# Patient Record
Sex: FEMALE | Race: BLACK OR AFRICAN AMERICAN | Employment: OTHER | URBAN - METROPOLITAN AREA
[De-identification: names, ages, dates, MRNs, and addresses within clinical notes are randomized per-mention and may not be internally consistent; named-entity substitution may affect disease eponyms.]

---

## 2022-01-13 ENCOUNTER — HOSPITAL ENCOUNTER (OUTPATIENT)
Age: 67
Setting detail: OBSERVATION
Discharge: HOME | End: 2022-01-16
Attending: EMERGENCY MEDICINE | Admitting: INTERNAL MEDICINE
Payer: MEDICARE

## 2022-01-13 ENCOUNTER — APPOINTMENT (OUTPATIENT)
Dept: CT IMAGING | Age: 67
End: 2022-01-13
Attending: EMERGENCY MEDICINE
Payer: MEDICARE

## 2022-01-13 ENCOUNTER — APPOINTMENT (OUTPATIENT)
Dept: GENERAL RADIOLOGY | Age: 67
End: 2022-01-13
Attending: EMERGENCY MEDICINE
Payer: MEDICARE

## 2022-01-13 ENCOUNTER — APPOINTMENT (OUTPATIENT)
Dept: VASCULAR SURGERY | Age: 67
End: 2022-01-13
Attending: EMERGENCY MEDICINE
Payer: MEDICARE

## 2022-01-13 ENCOUNTER — APPOINTMENT (OUTPATIENT)
Dept: NUCLEAR MEDICINE | Age: 67
End: 2022-01-13
Attending: EMERGENCY MEDICINE
Payer: MEDICARE

## 2022-01-13 DIAGNOSIS — U07.1 COVID-19: Primary | ICD-10-CM

## 2022-01-13 DIAGNOSIS — R06.02 SOB (SHORTNESS OF BREATH): ICD-10-CM

## 2022-01-13 PROBLEM — E44.0 MODERATE MALNUTRITION (HCC): Status: ACTIVE | Noted: 2021-01-06

## 2022-01-13 PROBLEM — N17.9 AKI (ACUTE KIDNEY INJURY) (HCC): Status: ACTIVE | Noted: 2018-06-06

## 2022-01-13 PROBLEM — I50.9 CHF EXACERBATION (HCC): Status: ACTIVE | Noted: 2021-10-23

## 2022-01-13 PROBLEM — J96.01 ACUTE RESPIRATORY FAILURE WITH HYPOXIA (HCC): Status: ACTIVE | Noted: 2018-06-05

## 2022-01-13 PROBLEM — I50.21 ACUTE SYSTOLIC CHF (CONGESTIVE HEART FAILURE) (HCC): Status: ACTIVE | Noted: 2019-08-14

## 2022-01-13 PROBLEM — J45.20 MILD INTERMITTENT ASTHMA WITHOUT COMPLICATION: Status: ACTIVE | Noted: 2019-08-15

## 2022-01-13 LAB
ALBUMIN SERPL-MCNC: 3.4 G/DL (ref 3.4–5)
ALBUMIN/GLOB SERPL: 0.9 {RATIO} (ref 0.8–1.7)
ALP SERPL-CCNC: 91 U/L (ref 45–117)
ALT SERPL-CCNC: 35 U/L (ref 13–56)
AMPHET UR QL SCN: NEGATIVE
ANION GAP SERPL CALC-SCNC: 8 MMOL/L (ref 3–18)
ARTERIAL PATENCY WRIST A: POSITIVE
AST SERPL-CCNC: 28 U/L (ref 10–38)
ATRIAL RATE: 98 BPM
BARBITURATES UR QL SCN: NEGATIVE
BASE DEFICIT BLD-SCNC: 3.1 MMOL/L
BASOPHILS # BLD: 0 K/UL (ref 0–0.1)
BASOPHILS NFR BLD: 0 % (ref 0–2)
BDY SITE: NORMAL
BENZODIAZ UR QL: NEGATIVE
BILIRUB SERPL-MCNC: 0.6 MG/DL (ref 0.2–1)
BNP SERPL-MCNC: 7386 PG/ML (ref 0–900)
BUN SERPL-MCNC: 27 MG/DL (ref 7–18)
BUN/CREAT SERPL: 16 (ref 12–20)
CALCIUM SERPL-MCNC: 9.4 MG/DL (ref 8.5–10.1)
CALCULATED P AXIS, ECG09: 30 DEGREES
CALCULATED R AXIS, ECG10: -23 DEGREES
CALCULATED T AXIS, ECG11: 136 DEGREES
CANNABINOIDS UR QL SCN: NEGATIVE
CHLORIDE SERPL-SCNC: 114 MMOL/L (ref 100–111)
CO2 SERPL-SCNC: 25 MMOL/L (ref 21–32)
COCAINE UR QL SCN: NEGATIVE
COVID-19 RAPID TEST, COVR: DETECTED
CREAT SERPL-MCNC: 1.74 MG/DL (ref 0.6–1.3)
CRP SERPL-MCNC: 1.1 MG/DL (ref 0–0.3)
D DIMER PPP FEU-MCNC: 1.6 UG/ML(FEU)
DIAGNOSIS, 93000: NORMAL
DIFFERENTIAL METHOD BLD: ABNORMAL
EOSINOPHIL # BLD: 0.1 K/UL (ref 0–0.4)
EOSINOPHIL NFR BLD: 1 % (ref 0–5)
ERYTHROCYTE [DISTWIDTH] IN BLOOD BY AUTOMATED COUNT: 15.5 % (ref 11.6–14.5)
FERRITIN SERPL-MCNC: 97 NG/ML (ref 8–388)
GAS FLOW.O2 O2 DELIVERY SYS: NORMAL L/MIN
GLOBULIN SER CALC-MCNC: 3.8 G/DL (ref 2–4)
GLUCOSE SERPL-MCNC: 99 MG/DL (ref 74–99)
HCO3 BLD-SCNC: 22.1 MMOL/L (ref 22–26)
HCT VFR BLD AUTO: 39.9 % (ref 35–45)
HDSCOM,HDSCOM: NORMAL
HGB BLD-MCNC: 11.9 G/DL (ref 12–16)
IMM GRANULOCYTES # BLD AUTO: 0 K/UL (ref 0–0.04)
IMM GRANULOCYTES NFR BLD AUTO: 0 % (ref 0–0.5)
L PNEUMO AG UR QL IA: NEGATIVE
LDH SERPL L TO P-CCNC: 243 U/L (ref 81–234)
LYMPHOCYTES # BLD: 1 K/UL (ref 0.9–3.6)
LYMPHOCYTES NFR BLD: 17 % (ref 21–52)
MCH RBC QN AUTO: 29 PG (ref 24–34)
MCHC RBC AUTO-ENTMCNC: 29.8 G/DL (ref 31–37)
MCV RBC AUTO: 97.1 FL (ref 78–100)
METHADONE UR QL: NEGATIVE
MONOCYTES # BLD: 0.3 K/UL (ref 0.05–1.2)
MONOCYTES NFR BLD: 6 % (ref 3–10)
NEUTS SEG # BLD: 4.5 K/UL (ref 1.8–8)
NEUTS SEG NFR BLD: 76 % (ref 40–73)
NRBC # BLD: 0 K/UL (ref 0–0.01)
NRBC BLD-RTO: 0 PER 100 WBC
O2/TOTAL GAS SETTING VFR VENT: 36 %
OPIATES UR QL: NEGATIVE
P-R INTERVAL, ECG05: 158 MS
PCO2 BLD: 39.3 MMHG (ref 35–45)
PCP UR QL: NEGATIVE
PH BLD: 7.36 [PH] (ref 7.35–7.45)
PLATELET # BLD AUTO: 243 K/UL (ref 135–420)
PMV BLD AUTO: 11.8 FL (ref 9.2–11.8)
PO2 BLD: 84 MMHG (ref 80–100)
POTASSIUM SERPL-SCNC: 3.8 MMOL/L (ref 3.5–5.5)
PROT SERPL-MCNC: 7.2 G/DL (ref 6.4–8.2)
Q-T INTERVAL, ECG07: 396 MS
QRS DURATION, ECG06: 116 MS
QTC CALCULATION (BEZET), ECG08: 505 MS
RBC # BLD AUTO: 4.11 M/UL (ref 4.2–5.3)
S PNEUM AG UR QL: NEGATIVE
SAO2 % BLD: 95.8 % (ref 92–97)
SARS-COV-2, COV2: NORMAL
SERVICE CMNT-IMP: NORMAL
SODIUM SERPL-SCNC: 147 MMOL/L (ref 136–145)
SOURCE, COVRS: ABNORMAL
SPECIMEN TYPE: NORMAL
TROPONIN-HIGH SENSITIVITY: 47 NG/L (ref 0–54)
VENTRICULAR RATE, ECG03: 98 BPM
WBC # BLD AUTO: 6 K/UL (ref 4.6–13.2)

## 2022-01-13 PROCEDURE — 83880 ASSAY OF NATRIURETIC PEPTIDE: CPT

## 2022-01-13 PROCEDURE — 71045 X-RAY EXAM CHEST 1 VIEW: CPT

## 2022-01-13 PROCEDURE — 36600 WITHDRAWAL OF ARTERIAL BLOOD: CPT

## 2022-01-13 PROCEDURE — U0003 INFECTIOUS AGENT DETECTION BY NUCLEIC ACID (DNA OR RNA); SEVERE ACUTE RESPIRATORY SYNDROME CORONAVIRUS 2 (SARS-COV-2) (CORONAVIRUS DISEASE [COVID-19]), AMPLIFIED PROBE TECHNIQUE, MAKING USE OF HIGH THROUGHPUT TECHNOLOGIES AS DESCRIBED BY CMS-2020-01-R: HCPCS

## 2022-01-13 PROCEDURE — 82728 ASSAY OF FERRITIN: CPT

## 2022-01-13 PROCEDURE — 87635 SARS-COV-2 COVID-19 AMP PRB: CPT

## 2022-01-13 PROCEDURE — 87449 NOS EACH ORGANISM AG IA: CPT

## 2022-01-13 PROCEDURE — G0378 HOSPITAL OBSERVATION PER HR: HCPCS

## 2022-01-13 PROCEDURE — 96376 TX/PRO/DX INJ SAME DRUG ADON: CPT

## 2022-01-13 PROCEDURE — 80307 DRUG TEST PRSMV CHEM ANLYZR: CPT

## 2022-01-13 PROCEDURE — 82803 BLOOD GASES ANY COMBINATION: CPT

## 2022-01-13 PROCEDURE — 84484 ASSAY OF TROPONIN QUANT: CPT

## 2022-01-13 PROCEDURE — 74011250636 HC RX REV CODE- 250/636: Performed by: EMERGENCY MEDICINE

## 2022-01-13 PROCEDURE — 83615 LACTATE (LD) (LDH) ENZYME: CPT

## 2022-01-13 PROCEDURE — 85379 FIBRIN DEGRADATION QUANT: CPT

## 2022-01-13 PROCEDURE — 74011250637 HC RX REV CODE- 250/637: Performed by: INTERNAL MEDICINE

## 2022-01-13 PROCEDURE — 71275 CT ANGIOGRAPHY CHEST: CPT

## 2022-01-13 PROCEDURE — 85025 COMPLETE CBC W/AUTO DIFF WBC: CPT

## 2022-01-13 PROCEDURE — 96375 TX/PRO/DX INJ NEW DRUG ADDON: CPT

## 2022-01-13 PROCEDURE — 74011250637 HC RX REV CODE- 250/637: Performed by: EMERGENCY MEDICINE

## 2022-01-13 PROCEDURE — 99223 1ST HOSP IP/OBS HIGH 75: CPT | Performed by: INTERNAL MEDICINE

## 2022-01-13 PROCEDURE — 93970 EXTREMITY STUDY: CPT

## 2022-01-13 PROCEDURE — 96374 THER/PROPH/DIAG INJ IV PUSH: CPT

## 2022-01-13 PROCEDURE — 80053 COMPREHEN METABOLIC PANEL: CPT

## 2022-01-13 PROCEDURE — 65270000029 HC RM PRIVATE

## 2022-01-13 PROCEDURE — 93005 ELECTROCARDIOGRAM TRACING: CPT

## 2022-01-13 PROCEDURE — 96372 THER/PROPH/DIAG INJ SC/IM: CPT

## 2022-01-13 PROCEDURE — 74011000250 HC RX REV CODE- 250: Performed by: INTERNAL MEDICINE

## 2022-01-13 PROCEDURE — A9540 TC99M MAA: HCPCS

## 2022-01-13 PROCEDURE — 74011000636 HC RX REV CODE- 636: Performed by: EMERGENCY MEDICINE

## 2022-01-13 PROCEDURE — 74011250636 HC RX REV CODE- 250/636: Performed by: INTERNAL MEDICINE

## 2022-01-13 PROCEDURE — 99285 EMERGENCY DEPT VISIT HI MDM: CPT

## 2022-01-13 PROCEDURE — 86140 C-REACTIVE PROTEIN: CPT

## 2022-01-13 PROCEDURE — 94762 N-INVAS EAR/PLS OXIMTRY CONT: CPT

## 2022-01-13 PROCEDURE — C9113 INJ PANTOPRAZOLE SODIUM, VIA: HCPCS | Performed by: INTERNAL MEDICINE

## 2022-01-13 RX ORDER — CARVEDILOL 6.25 MG/1
6.25 TABLET ORAL 2 TIMES DAILY
COMMUNITY
Start: 2021-10-22

## 2022-01-13 RX ORDER — ENOXAPARIN SODIUM 100 MG/ML
1 INJECTION SUBCUTANEOUS EVERY 12 HOURS
Status: DISCONTINUED | OUTPATIENT
Start: 2022-01-13 | End: 2022-01-13

## 2022-01-13 RX ORDER — CALCIUM CARB/MAGNESIUM CARB 311-232MG
10 TABLET ORAL
Status: DISCONTINUED | OUTPATIENT
Start: 2022-01-13 | End: 2022-01-16 | Stop reason: HOSPADM

## 2022-01-13 RX ORDER — SODIUM CHLORIDE 0.9 % (FLUSH) 0.9 %
5-40 SYRINGE (ML) INJECTION AS NEEDED
Status: DISCONTINUED | OUTPATIENT
Start: 2022-01-13 | End: 2022-01-16 | Stop reason: HOSPADM

## 2022-01-13 RX ORDER — ACETAMINOPHEN 650 MG/1
650 SUPPOSITORY RECTAL
Status: DISCONTINUED | OUTPATIENT
Start: 2022-01-13 | End: 2022-01-16 | Stop reason: HOSPADM

## 2022-01-13 RX ORDER — ACETAMINOPHEN 325 MG/1
650 TABLET ORAL
Status: DISCONTINUED | OUTPATIENT
Start: 2022-01-13 | End: 2022-01-16 | Stop reason: HOSPADM

## 2022-01-13 RX ORDER — DEXAMETHASONE SODIUM PHOSPHATE 10 MG/ML
6 INJECTION INTRAMUSCULAR; INTRAVENOUS
Status: COMPLETED | OUTPATIENT
Start: 2022-01-13 | End: 2022-01-13

## 2022-01-13 RX ORDER — HYDRALAZINE HYDROCHLORIDE 25 MG/1
25 TABLET, FILM COATED ORAL 3 TIMES DAILY
Status: DISCONTINUED | OUTPATIENT
Start: 2022-01-13 | End: 2022-01-16 | Stop reason: HOSPADM

## 2022-01-13 RX ORDER — BUMETANIDE 0.25 MG/ML
1 INJECTION INTRAMUSCULAR; INTRAVENOUS EVERY 12 HOURS
Status: DISCONTINUED | OUTPATIENT
Start: 2022-01-13 | End: 2022-01-15

## 2022-01-13 RX ORDER — ENOXAPARIN SODIUM 100 MG/ML
1 INJECTION SUBCUTANEOUS ONCE
Status: COMPLETED | OUTPATIENT
Start: 2022-01-13 | End: 2022-01-13

## 2022-01-13 RX ORDER — ENOXAPARIN SODIUM 100 MG/ML
30 INJECTION SUBCUTANEOUS EVERY 12 HOURS
Status: DISCONTINUED | OUTPATIENT
Start: 2022-01-13 | End: 2022-01-14

## 2022-01-13 RX ORDER — ASCORBIC ACID 250 MG
250 TABLET ORAL DAILY
Status: DISCONTINUED | OUTPATIENT
Start: 2022-01-13 | End: 2022-01-16 | Stop reason: HOSPADM

## 2022-01-13 RX ORDER — POLYETHYLENE GLYCOL 3350 17 G/17G
17 POWDER, FOR SOLUTION ORAL DAILY PRN
Status: DISCONTINUED | OUTPATIENT
Start: 2022-01-13 | End: 2022-01-16 | Stop reason: HOSPADM

## 2022-01-13 RX ORDER — ONDANSETRON 2 MG/ML
4 INJECTION INTRAMUSCULAR; INTRAVENOUS
Status: DISCONTINUED | OUTPATIENT
Start: 2022-01-13 | End: 2022-01-16 | Stop reason: HOSPADM

## 2022-01-13 RX ORDER — ATORVASTATIN CALCIUM 20 MG/1
40 TABLET, FILM COATED ORAL
Status: DISCONTINUED | OUTPATIENT
Start: 2022-01-13 | End: 2022-01-16 | Stop reason: HOSPADM

## 2022-01-13 RX ORDER — DEXAMETHASONE SODIUM PHOSPHATE 10 MG/ML
6 INJECTION INTRAMUSCULAR; INTRAVENOUS EVERY 24 HOURS
Status: DISCONTINUED | OUTPATIENT
Start: 2022-01-14 | End: 2022-01-16

## 2022-01-13 RX ORDER — SODIUM CHLORIDE 0.9 % (FLUSH) 0.9 %
5-40 SYRINGE (ML) INJECTION EVERY 8 HOURS
Status: DISCONTINUED | OUTPATIENT
Start: 2022-01-13 | End: 2022-01-16 | Stop reason: HOSPADM

## 2022-01-13 RX ORDER — NITROGLYCERIN 0.4 MG/1
0.4 TABLET SUBLINGUAL
Status: COMPLETED | OUTPATIENT
Start: 2022-01-13 | End: 2022-01-13

## 2022-01-13 RX ORDER — ZINC SULFATE 50(220)MG
1 CAPSULE ORAL DAILY
Status: DISCONTINUED | OUTPATIENT
Start: 2022-01-14 | End: 2022-01-16 | Stop reason: HOSPADM

## 2022-01-13 RX ORDER — BUDESONIDE AND FORMOTEROL FUMARATE DIHYDRATE 80; 4.5 UG/1; UG/1
2 AEROSOL RESPIRATORY (INHALATION)
Status: DISCONTINUED | OUTPATIENT
Start: 2022-01-13 | End: 2022-01-16 | Stop reason: HOSPADM

## 2022-01-13 RX ORDER — ACETAMINOPHEN 325 MG/1
650 TABLET ORAL
Status: DISCONTINUED | OUTPATIENT
Start: 2022-01-13 | End: 2022-01-14 | Stop reason: SDUPTHER

## 2022-01-13 RX ORDER — ACETAMINOPHEN 650 MG/1
650 SUPPOSITORY RECTAL
Status: DISCONTINUED | OUTPATIENT
Start: 2022-01-13 | End: 2022-01-14 | Stop reason: SDUPTHER

## 2022-01-13 RX ORDER — FUROSEMIDE 10 MG/ML
40 INJECTION INTRAMUSCULAR; INTRAVENOUS
Status: COMPLETED | OUTPATIENT
Start: 2022-01-13 | End: 2022-01-13

## 2022-01-13 RX ORDER — ONDANSETRON 4 MG/1
4 TABLET, ORALLY DISINTEGRATING ORAL
Status: DISCONTINUED | OUTPATIENT
Start: 2022-01-13 | End: 2022-01-16 | Stop reason: HOSPADM

## 2022-01-13 RX ORDER — CARVEDILOL 3.12 MG/1
6.25 TABLET ORAL ONCE
Status: COMPLETED | OUTPATIENT
Start: 2022-01-13 | End: 2022-01-13

## 2022-01-13 RX ORDER — GUAIFENESIN/DEXTROMETHORPHAN 100-10MG/5
5 SYRUP ORAL
Status: DISCONTINUED | OUTPATIENT
Start: 2022-01-13 | End: 2022-01-16 | Stop reason: HOSPADM

## 2022-01-13 RX ORDER — CLOPIDOGREL BISULFATE 75 MG/1
75 TABLET ORAL DAILY
Status: DISCONTINUED | OUTPATIENT
Start: 2022-01-14 | End: 2022-01-16 | Stop reason: HOSPADM

## 2022-01-13 RX ORDER — HYDRALAZINE HYDROCHLORIDE 20 MG/ML
10 INJECTION INTRAMUSCULAR; INTRAVENOUS
Status: DISCONTINUED | OUTPATIENT
Start: 2022-01-13 | End: 2022-01-16 | Stop reason: HOSPADM

## 2022-01-13 RX ADMIN — IOPAMIDOL 100 ML: 755 INJECTION, SOLUTION INTRAVENOUS at 08:42

## 2022-01-13 RX ADMIN — NITROGLYCERIN 0.4 MG: 0.4 TABLET SUBLINGUAL at 07:35

## 2022-01-13 RX ADMIN — Medication 250 MG: at 12:10

## 2022-01-13 RX ADMIN — CARVEDILOL 6.25 MG: 3.12 TABLET, FILM COATED ORAL at 11:31

## 2022-01-13 RX ADMIN — ENOXAPARIN SODIUM 30 MG: 100 INJECTION SUBCUTANEOUS at 21:47

## 2022-01-13 RX ADMIN — BUMETANIDE 1 MG: 0.25 INJECTION, SOLUTION INTRAMUSCULAR; INTRAVENOUS at 21:44

## 2022-01-13 RX ADMIN — Medication 10 MG: at 21:47

## 2022-01-13 RX ADMIN — ATORVASTATIN CALCIUM 40 MG: 20 TABLET, FILM COATED ORAL at 21:43

## 2022-01-13 RX ADMIN — ISOSORBIDE DINITRATE 30 MG: 20 TABLET ORAL at 20:57

## 2022-01-13 RX ADMIN — HYDRALAZINE HYDROCHLORIDE 25 MG: 25 TABLET, FILM COATED ORAL at 21:44

## 2022-01-13 RX ADMIN — DEXAMETHASONE SODIUM PHOSPHATE 6 MG: 10 INJECTION, SOLUTION INTRAMUSCULAR; INTRAVENOUS at 12:11

## 2022-01-13 RX ADMIN — SODIUM CHLORIDE, PRESERVATIVE FREE 40 MG: 5 INJECTION INTRAVENOUS at 21:46

## 2022-01-13 RX ADMIN — SODIUM CHLORIDE, PRESERVATIVE FREE 40 MG: 5 INJECTION INTRAVENOUS at 12:15

## 2022-01-13 RX ADMIN — ENOXAPARIN SODIUM 100 MG: 100 INJECTION SUBCUTANEOUS at 10:11

## 2022-01-13 RX ADMIN — FUROSEMIDE 40 MG: 10 INJECTION, SOLUTION INTRAMUSCULAR; INTRAVENOUS at 07:36

## 2022-01-13 NOTE — Clinical Note
Status[de-identified] INPATIENT [101]   Type of Bed: Telemetry [19]   Cardiac Monitoring Required?: Yes   Inpatient Hospitalization Certified Necessary for the Following Reasons: 3.  Patient receiving treatment that can only be provided in an inpatient setting (further clarification in H&P documentation)   Admitting Diagnosis: Saige Levi [[id number]]   Admitting Physician: Con Pardaa [[id number]]   Attending Physician: Con Parada [[id number]]   Estimated Length of Stay: 2 Midnights   Discharge Plan[de-identified] Home with Office Follow-up

## 2022-01-13 NOTE — H&P
History & Physical    Patient: Audelia Smith MRN: [id number]  CSN: [id number]    YOB: 1955  Age: 74 y.o. Sex: female      DOA: 1/13/2022    Chief Complaint:   Chief Complaint   Patient presents with    Shortness of Breath          HPI:     Audelia Smith is a 74 y.o.  female who history of congestive heart failure, hypertension, TIA, chronic kidney disease stage IV baseline creatinine 2.2, asthma or presents to the emergency room with increasing shortness of breath and dyspnea that has been progressive for the last 2 days. Patient started with cough and congestion on Tuesday this is verified with her daughter. She has had 1 Moderna vaccine back in July and has never completed her second shot or booster    In the emergency room she is given Lasix 40 mg IV with diuresis  CTA done in the emergency room is inconclusive for PE  VQ scan was ordered and was pending at the time of admission  Dopplers of her legs were also ordered  Although patient was not severely hypoxic she was tachypneic and it was thought that she would benefit from admission. Please call [facility]  [phone] cell number   Anisa sister for emergency          Past Medical History:   Diagnosis Date    CHF (congestive heart failure) ([facility] [address]     Hypertension        History reviewed. No pertinent surgical history. History reviewed. No pertinent family history. Social History     Socioeconomic History    Marital status:    Tobacco Use    Smoking status: Never Smoker    Smokeless tobacco: Never Used   Substance and Sexual Activity    Alcohol use: Not Currently    Drug use: Never       Prior to Admission medications    Medication Sig Start Date End Date Taking? Authorizing Provider   carvediloL (COREG) 6.25 mg tablet Take 6.25 mg by mouth two (2) times a day.  10/22/21  Yes Other, MD Sayda       No Known Allergies      Review of Systems  GENERAL: Patient alert, awake and oriented times 3, able to communicate full sentences and not in distress. HEENT: No change in vision, no earache, tinnitus, sore throat or sinus congestion. NECK: No pain or stiffness. PULMONARY:+ shortness of breath, +cough or wheeze. Cardiovascular: no pnd or orthopnea, no CP  GASTROINTESTINAL: No abdominal pain, nausea, vomiting or diarrhea, melena or bright red blood per rectum. GENITOURINARY: No urinary frequency, urgency, hesitancy or dysuria. MUSCULOSKELETAL: No joint or muscle pain, no back pain, no recent trauma. DERMATOLOGIC: No rash, no itching, no lesions. ENDOCRINE: No polyuria, polydipsia, no heat or cold intolerance. No recent change in weight. HEMATOLOGICAL: No anemia or easy bruising or bleeding. NEUROLOGIC: No headache, seizures, numbness, tingling or weakness. Physical Exam:     Physical Exam:  Visit Vitals  BP (!) 181/98   Pulse 92   Temp 97.7 °F (36.5 °C)   Resp 27   Ht 5' 6\" (1.676 m)   Wt 97.5 kg (215 lb)   SpO2 100%   BMI 34.70 kg/m²      O2 Device: Nasal cannula    Temp (24hrs), Av.7 °F (36.5 °C), Min:97.7 °F (36.5 °C), Max:97.7 °F (36.5 °C)    No intake/output data recorded. No intake/output data recorded. General:  Alert, cooperative, no distress, appears stated age. Head: Normocephalic, without obvious abnormality, atraumatic. Eyes:  Conjunctivae/corneas clear. PERRL, EOMs intact. Nose: Nares normal. No drainage or sinus tenderness. Neck: Supple, symmetrical, trachea midline, no adenopathy, thyroid: no enlargement, no carotid bruit and no JVD. Lungs:   Clear to auscultation bilaterally. Heart:  Regular rate and rhythm, S1, S2 normal.     Abdomen: Soft, non-tender. Bowel sounds normal.    Extremities: Extremities normal, atraumatic, no cyanosis or edema. Pulses: 2+ and symmetric all extremities. Skin:  No rashes or lesions   Neurologic: AAOx3, No focal motor or sensory deficit. Labs Reviewed: All lab results for the last 24 hours reviewed.    and EKG    Procedures/imaging: see electronic medical records for all procedures/Xrays and details which were not copied into this note but were reviewed prior to creation of Plan      Assessment/Plan     Active Problems:    COVID (1/13/2022)  Started on dexamethasone  Started on vitamin cocktail  Started on therapeutic dose Lovenox this can be decreased to intermediate dose if PE and DVT studies are negative  She would be a fair candidate for remdesivir however she has a creatinine clearance of 28  We will get an infectious disease consult    Congestive heart failure  She is continued on IV Bumex CT scan does not show pleural effusions there is no generalized anasarca  Echo is ordered  Cardiology is consulted    Stage III renal disease  She was admitted in October with renal failure and CHF I will have nephrology consult to help out with management of diuretics as well in the setting of COVID and borderline CHF    Asthma   she is continued on her inhalers  Started on steroids    History of TIA  She is continuing her Plavix     Remote history of GI bleed   Will place on Protonix  And monitor her symptoms  DVT/GI Prophylaxis: Lovenox        Amber Calvin MD  1/13/2022 11:42 AM

## 2022-01-13 NOTE — ED PROVIDER NOTES
EMERGENCY DEPARTMENT HISTORY AND PHYSICAL EXAM    Date: 1/13/2022  Patient Name: Mellissa Lefort    History of Presenting Illness     Chief Complaint   Patient presents with    Shortness of Breath         History Provided By: Patient    Mellissa Lefort is a 53-year-old female with past medical history of obesity, congestive heart failure, hypertension presents for evaluation of shortness of breath. Patient started having shortness of breath at 10:00 last night. Patient states that this is worsened when she lays flat. On EMS arrival, patient was found to be 94% on room air, though tachypneic. She was given albuterol by EMS with significant improvement in her symptoms. She has recently had flulike symptoms, she has previously been vaccinated for COVID but has not received her booster. She denies any increased lower extremity swelling, nausea, vomiting, abdominal pain. PCP: Sheri Brito MD        Past History     Past Medical History:  Past Medical History:   Diagnosis Date    CHF (congestive heart failure) ([facility] [address]     Hypertension        Past Surgical History:  History reviewed. No pertinent surgical history. Family History:  History reviewed. No pertinent family history. Social History:  Social History     Tobacco Use    Smoking status: Never Smoker    Smokeless tobacco: Never Used   Substance Use Topics    Alcohol use: Not Currently    Drug use: Never       Allergies:  No Known Allergies      Review of Systems   Review of Systems   Constitutional: Negative for activity change and fever. HENT: Negative for congestion and sore throat. Eyes: Negative for discharge. Respiratory: Positive for shortness of breath. Negative for apnea. Cardiovascular: Negative for chest pain. Gastrointestinal: Negative for abdominal distention. Genitourinary: Negative for dysuria and flank pain. Musculoskeletal: Negative for arthralgias. Skin: Negative for rash.    Neurological: Negative for dizziness and weakness. Hematological: Negative for adenopathy. Psychiatric/Behavioral: Negative for agitation. All other systems reviewed and are negative. Physical Exam     Vitals:    01/13/22 1230 01/13/22 1347 01/13/22 1400 01/13/22 1500   BP: (!) 172/104 (!) 156/122 (!) 151/78 (!) 142/97   Pulse: 84 84 72 84   Resp: 25 16 19 20   Temp:       SpO2: 99% 96% 94% 94%   Weight:       Height:         Physical Exam    Nursing notes and vital signs reviewed    Constitutional: Non toxic appearing, moderate distress  Head: Normocephalic, Atraumatic  Eyes: EOMI  Neck: Supple  Cardiovascular: Regular rate and rhythm, no murmurs, rubs, or gallops  Chest: Normal work of breathing and chest excursion bilaterally  Lungs:  Tachypnea, no wheezing, minimally prolonged expiratory phase with diminished lung sounds in the right lung base  Abdomen: Soft, non tender, non distended, normoactive bowel sounds  Back: No evidence of trauma or deformity  Extremities: No evidence of trauma or deformity, nonpitting edema to bilateral lower extremities  Skin: Warm and dry, normal cap refill  Neuro: Alert and appropriate, CN intact, normal speech, strength and sensation full and symmetric bilaterally, normal coordination  Psychiatric: Normal mood and affect      Diagnostic Study Results     Labs -     Recent Results (from the past 12 hour(s))   EKG, 12 LEAD, INITIAL    Collection Time: 01/13/22  7:15 AM   Result Value Ref Range    Ventricular Rate 98 BPM    Atrial Rate 98 BPM    P-R Interval 158 ms    QRS Duration 116 ms    Q-T Interval 396 ms    QTC Calculation (Bezet) 505 ms    Calculated P Axis 30 degrees    Calculated R Axis -23 degrees    Calculated T Axis 136 degrees    Diagnosis       Sinus rhythm with occasional premature ventricular complexes  Possible Left atrial enlargement  Left ventricular hypertrophy with QRS widening and repolarization abnormality   ( R in aVL , Sokolow-Waggoner , Lima product )  Anteroseptal infarct , age undetermined  Abnormal ECG  No previous ECGs available  Confirmed by Petra Paiz MD, -- ([id number]) on 1/13/2022 8:55:22 AM     CBC WITH AUTOMATED DIFF    Collection Time: 01/13/22  7:23 AM   Result Value Ref Range    WBC 6.0 4.6 - 13.2 K/uL    RBC 4.11 (L) 4.20 - 5.30 M/uL    HGB 11.9 (L) 12.0 - 16.0 g/dL    HCT 39.9 35.0 - 45.0 %    MCV 97.1 78.0 - 100.0 FL    MCH 29.0 24.0 - 34.0 PG    MCHC 29.8 (L) 31.0 - 37.0 g/dL    RDW 15.5 (H) 11.6 - 14.5 %    PLATELET 117 102 - 978 K/uL    MPV 11.8 9.2 - 11.8 FL    NRBC 0.0 0  WBC    ABSOLUTE NRBC 0.00 0.00 - 0.01 K/uL    NEUTROPHILS 76 (H) 40 - 73 %    LYMPHOCYTES 17 (L) 21 - 52 %    MONOCYTES 6 3 - 10 %    EOSINOPHILS 1 0 - 5 %    BASOPHILS 0 0 - 2 %    IMMATURE GRANULOCYTES 0 0.0 - 0.5 %    ABS. NEUTROPHILS 4.5 1.8 - 8.0 K/UL    ABS. LYMPHOCYTES 1.0 0.9 - 3.6 K/UL    ABS. MONOCYTES 0.3 0.05 - 1.2 K/UL    ABS. EOSINOPHILS 0.1 0.0 - 0.4 K/UL    ABS. BASOPHILS 0.0 0.0 - 0.1 K/UL    ABS. IMM. GRANS. 0.0 0.00 - 0.04 K/UL    DF AUTOMATED     METABOLIC PANEL, COMPREHENSIVE    Collection Time: 01/13/22  7:23 AM   Result Value Ref Range    Sodium 147 (H) 136 - 145 mmol/L    Potassium 3.8 3.5 - 5.5 mmol/L    Chloride 114 (H) 100 - 111 mmol/L    CO2 25 21 - 32 mmol/L    Anion gap 8 3.0 - 18 mmol/L    Glucose 99 74 - 99 mg/dL    BUN 27 (H) 7.0 - 18 MG/DL    Creatinine 1.74 (H) 0.6 - 1.3 MG/DL    BUN/Creatinine ratio 16 12 - 20      GFR est AA 36 (L) >60 ml/min/1.73m2    GFR est non-AA 29 (L) >60 ml/min/1.73m2    Calcium 9.4 8.5 - 10.1 MG/DL    Bilirubin, total 0.6 0.2 - 1.0 MG/DL    ALT (SGPT) 35 13 - 56 U/L    AST (SGOT) 28 10 - 38 U/L    Alk.  phosphatase 91 45 - 117 U/L    Protein, total 7.2 6.4 - 8.2 g/dL    Albumin 3.4 3.4 - 5.0 g/dL    Globulin 3.8 2.0 - 4.0 g/dL    A-G Ratio 0.9 0.8 - 1.7     NT-PRO BNP    Collection Time: 01/13/22  7:23 AM   Result Value Ref Range    NT pro-BNP 7,386 (H) 0 - 900 PG/ML   TROPONIN-HIGH SENSITIVITY    Collection Time: 01/13/22  7:23 AM Result Value Ref Range    Troponin-High Sensitivity 47 0 - 54 ng/L   D DIMER    Collection Time: 01/13/22  7:23 AM   Result Value Ref Range    D DIMER 1.60 (H) <0.46 ug/ml(FEU)   SARS-COV-2    Collection Time: 01/13/22  7:25 AM   Result Value Ref Range    SARS-CoV-2 Please find results under separate order     COVID-19 RAPID TEST    Collection Time: 01/13/22  7:25 AM   Result Value Ref Range    Specimen source Nasopharyngeal      COVID-19 rapid test Detected (AA) NOTD     BLOOD GAS, ARTERIAL POC    Collection Time: 01/13/22  7:50 AM   Result Value Ref Range    Device: NASAL CANNULA      FIO2 (POC) 36 %    pH (POC) 7.36 7.35 - 7.45      pCO2 (POC) 39.3 35.0 - 45.0 MMHG    pO2 (POC) 84 80 - 100 MMHG    HCO3 (POC) 22.1 22 - 26 MMOL/L    sO2 (POC) 95.8 92 - 97 %    Base deficit (POC) 3.1 mmol/L    Allens test (POC) Positive      Site RIGHT RADIAL      Specimen type (POC) ARTERIAL      Performed by Isr[facility] RESP        Radiologic Studies -   NM LUNG SCAN PERF   Final Result      No scintigraphic findings to suggest the presence of acute pulmonary embolism. _______________        CTA CHEST W OR W WO CONT   Final Result      1. Mildly limited study due to motion obscuring peripheral vessels but no   definite evidence of pulmonary embolism. 2. Mild areas of bilateral lower lobe and partial right upper lobe atelectasis. Very mild peripheral nonspecific groundglass opacities bilateral upper lobes   which can be related to small airway disease, edema or atypical infection. 3. Cardiomegaly with reflux of contrast into IVC suggesting right heart failure. 4. Incidental bilateral nonobstructing renal calculi. XR CHEST PORT   Final Result         1.  Borderline cardiac enlargement with patchy perihilar and basilar areas of   edema/atelectasis       DUPLEX LOWER EXT VENOUS BILAT    (Results Pending)     CT Results  (Last 48 hours)               01/13/22 0859  CTA CHEST W OR W WO CONT Final result Impression:      1. Mildly limited study due to motion obscuring peripheral vessels but no   definite evidence of pulmonary embolism. 2. Mild areas of bilateral lower lobe and partial right upper lobe atelectasis. Very mild peripheral nonspecific groundglass opacities bilateral upper lobes   which can be related to small airway disease, edema or atypical infection. 3. Cardiomegaly with reflux of contrast into IVC suggesting right heart failure. 4. Incidental bilateral nonobstructing renal calculi. Narrative:  EXAM: CTA Chest       CLINICAL INDICATION/HISTORY: Shortness of breath, Covid positive. Possible PE.       COMPARISON: Plain film chest same day       TECHNIQUE: Axial CT imaging from the thoracic inlet through the diaphragm with   intravenous contrast utilizing CTA study for pulmonary artery evaluation. Coronal and sagittal MIP reformations were generated at a separate workstation. One or more dose reduction techniques were used on this CT: automated exposure   control, adjustment of the mAs and/or kVp according to patient's size, and   iterative reconstruction techniques. The specific techniques utilized on this CT   exam have been documented in the patient's electronic medical record. Digital   imaging and communications and medicine (DICOM) format image data are available   to nonaffiliated external healthcare facilities or entities on a secure, media   free, reciprocally searchable basis with patient authorization for at least a 12   month period after this study. _______________       FINDINGS:       EXAM QUALITY: Overall exam quality is adequate. Pulmonary arterial enhancement   is adequate. The breath hold is satisfactory. Respiratory motion artifact is   present, limiting evaluation of peripheral vessels. PULMONARY ARTERIES: No convincing evidence of pulmonary embolism.        MEDIASTINUM: Mild diffuse cardiomegaly with coronary artery calcifications with   no pericardial effusion. Mild atherosclerotic changes and ectasia thoracic   aorta. LYMPH NODES: Borderline enlarged mediastinal lymph nodes maximally 12 mm AP   window short axis dimension. AIRWAY: Unremarkable. LUNGS: Mild partial atelectasis bilateral lower lobes and mild small peripheral   areas of groundglass opacity bilateral upper lobes as well as small bandlike   atelectasis or scarring right upper lobe. No abnormal mass. PLEURA: No pleural effusion or pneumothorax. UPPER ABDOMEN: Reflux of contrast into the IVC and hepatic veins. Several   nonobstructing right renal calculi the largest right lower pole maximally 12 mm. Several small nonobstructing left renal calculi. .       OTHER: No acute or aggressive osseous abnormalities identified. _______________               CXR Results  (Last 48 hours)               01/13/22 0737  XR CHEST PORT Final result    Impression:          1. Borderline cardiac enlargement with patchy perihilar and basilar areas of   edema/atelectasis        Narrative:  EXAM: XR CHEST PORT       CLINICAL INDICATION/HISTORY: Shortness of breath   -Additional: None       COMPARISON: None       TECHNIQUE: Portable frontal view of the chest       _______________       FINDINGS:       SUPPORT DEVICES: None. HEART AND MEDIASTINUM: Cardiac size is borderline enlarged for AP technique. Mediastinal contours appear within normal limits. LUNGS AND PLEURAL SPACES: Rotated nature of the projection foreshortening the   left hemithorax. Perihilar areas of opacity are demonstrated with more linear   configuration densities at right greater than left lung bases. No pneumothorax   or pleural effusion.        BONY THORAX AND SOFT TISSUES: Unremarkable.       _______________                 Medications given in the ED-  Medications   acetaminophen (TYLENOL) tablet 650 mg (has no administration in time range)     Or   acetaminophen (TYLENOL) suppository 650 mg (has no administration in time range)   dexamethasone (PF) (DECADRON) 10 mg/mL injection 6 mg (has no administration in time range)   enoxaparin (LOVENOX) injection 100 mg (has no administration in time range)   guaiFENesin-dextromethorphan (ROBITUSSIN DM) 100-10 mg/5 mL syrup 5 mL (has no administration in time range)   ascorbic acid (vitamin C) (VITAMIN C) tablet 250 mg (250 mg Oral Given 1/13/22 1210)   zinc sulfate (ZINCATE) 50 mg zinc (220 mg) capsule 1 Capsule (has no administration in time range)   melatonin (rapid dissolve) tablet 10 mg (has no administration in time range)   bumetanide (BUMEX) injection 1 mg (0 mg IntraVENous Held 1/13/22 1158)   pantoprazole (PROTONIX) 40 mg in 0.9% sodium chloride 10 mL injection (40 mg IntraVENous Given 1/13/22 1215)   sodium chloride (NS) flush 5-40 mL (has no administration in time range)   sodium chloride (NS) flush 5-40 mL (has no administration in time range)   acetaminophen (TYLENOL) tablet 650 mg (has no administration in time range)     Or   acetaminophen (TYLENOL) suppository 650 mg (has no administration in time range)   polyethylene glycol (MIRALAX) packet 17 g (has no administration in time range)   ondansetron (ZOFRAN ODT) tablet 4 mg (has no administration in time range)     Or   ondansetron (ZOFRAN) injection 4 mg (has no administration in time range)   nitroglycerin (NITROSTAT) tablet 0.4 mg (0.4 mg SubLINGual Given 1/13/22 0735)   furosemide (LASIX) injection 40 mg (40 mg IntraVENous Given 1/13/22 0736)   iopamidoL (ISOVUE-370) 76 % injection 100 mL (100 mL IntraVENous Given 1/13/22 0842)   enoxaparin (LOVENOX) injection 100 mg (100 mg SubCUTAneous Given 1/13/22 1011)   carvediloL (COREG) tablet 6.25 mg (6.25 mg Oral Given 1/13/22 1131)   dexamethasone (PF) (DECADRON) 10 mg/mL injection 6 mg (6 mg IntraVENous Given 1/13/22 1211)   technetium albumin aggregated (MAA) solution 6.2 millicurie (6.2 millicuries IntraVENous Given 1/13/22 1245) Medical Decision Making   I am the first provider for this patient. I reviewed the vital signs, available nursing notes, past medical history, past surgical history, family history and social history. Vital Signs-Reviewed the patient's vital signs. Pulse Oximetry Analysis - 94% on room air, not hypoxic     Cardiac Monitor:  Rate: 87 bpm  Rhythm: Normal sinus rhythm    EKG interpretation: (Preliminary)  EKG read by Dr. Leon Libman at 98   Normal sinus rhythm, rate 98  , , QTc 505  Extensive artifact skews interpretation, ST depression is noted in V6 with T wave inversions in 1, aVL  Left ventricular hypertrophy is noted  No previous EKG for comparison      Records Reviewed: Old Medical Records    Provider Notes (Medical Decision Making): Clement Miranda is a 74 y.o. female presents for evaluation of shortness of breath, tachypnea. On arrival patient is afebrile. Patient is noted to be tachypneic, though no wheezing is noted, work of breathing is disproportionate to patient's oxygen level which is 94%. Differential includes but is not limited to pulmonary embolus, CHF exacerbation, COPD, sleep apnea, COVID-19, pneumonia. CBC, CMP, BNP, troponin, CT angiography, chest x-ray, EKG obtained. No previous EKG is available within the system. Patient placed on 2 L nasal cannula for work of breathing with blood gas obtained. Blood gas revealed a PO2 of 83 on 2 L nasal cannula, otherwise with normal acid-base status. Chest x-ray reveals area of perihilar infiltrate, rapid COVID has returned positive. CT angiography does not reveal evidence of a pulmonary embolus, however there is concern for possible RV failure in the vasculature is incompletely evaluated. Given this we will send for VQ scan. I suspect a component of patient's work of breathing may be related to possibly some anxiety as well as a mild CHF exacerbation in the setting of RV failure, patient given Lasix.   Given the incomplete evaluation of CTA with elevated D-dimer we will prophylactically treat with 1 mg/kg of Lovenox while VQ scan and DVT ultrasounds are pending. Dr. Rachael Jennings of cardiology consulted, patient will be admitted to the hospitalist for further evaluation, discussed with Dr. Keary Brittle who has accepted patient for admission. Patient administered Decadron in the setting of nasal cannula use and positive COVID result. updated patient on findings and plan. Patient administered her home blood pressure medications. Procedures:  Procedures    ED Course:       Diagnosis and Disposition     CONSULT NOTE:   3:25 PM  I spoke with Dr. Rachael Jennings  Specialty: Cardiology  Discussed pt's hx, disposition, and available diagnostic and imaging results. Reviewed care plans. Consulting physician agrees with plans as outlined. Written by Kandee Leventhal, MD, ED Scribe    ADMISSION NOTE:  3:25 PM  Patient is being admitted to the hospital by Dr. Keary Brittle. The results of their tests and reasons for their admission have been discussed with them and/or available family. They convey agreement and understanding for the need to be admitted and for their admission diagnosis. Written by Kandee Leventhal, MD    CONDITIONS ON ADMISSION:  Deep Vein Thrombosis is not present at the time of admission. Thrombosis is not present at the time of admission. Urinary Tract Infection is not present at the time of admission. Pneumonia is present at the time of admission. MRSA is not present at the time of admission. Wound infection is not present at the time of admission. Pressure Ulcer is not present at the time of admission. CLINICAL IMPRESSION    1. COVID-19    2. SOB (shortness of breath)      CLINICAL IMPRESSION:    1. COVID-19    2. SOB (shortness of breath)        PLAN:  1. Admit to hospitalist  2. O2 PRN  3. Decadron  Current Discharge Medication List        3.    Follow-up Information    None _______________________________      Please note that this dictation was completed with [employer], the computer voice recognition software. Quite often unanticipated grammatical, syntax, homophones, and other interpretive errors are inadvertently transcribed by the computer software. Please disregard these errors. Please excuse any errors that have escaped final proofreading.

## 2022-01-13 NOTE — CONSULTS
Cardiolology  Inpatient Consult      Patient: Audelia Smith               Sex: female          DOA: 1/13/2022       YOB: 1955      Age:  74 y.o.        LOS:  LOS: 0 days      Audelia Smith is a 74 y.o. female admitted for [facility] [U07.1]     Recommendations:  · Echo  · COVID-19 per [address]  · Critical Care/Pulmonary consult    Impression:  · COVID-19   · Probable COVID pneumonia  · Shortness of breath   · Probable CHF  · CKD  · Other problems as enumerated below    Patient Active Problem List    Diagnosis Date Noted    COVID 01/13/2022    CHF exacerbation ([facility] 10/23/2021    Moderate malnutrition ([facility] 01/06/2021    Mild intermittent asthma without complication 92/55/1537    Acute systolic CHF (congestive heart failure) ([facility] 08/14/2019    LEDY (acute kidney injury) ([facility] 06/06/2018    Acute respiratory failure with hypoxia ([facility] 06/05/2018    Malignant neoplasm of female breast ([facility] 11/24/2014    S/P bilateral mastectomy 05/02/2014    Stage 4 chronic kidney disease ([facility] 12/12/2013    Atherosclerosis of coronary artery 11/06/2013    Essential hypertension 11/06/2013      Liz Hoang MD  Past Medical History:   Diagnosis Date    CHF (congestive heart failure) ([facility]     Hypertension       History reviewed. No pertinent surgical history. No Known Allergies   History reviewed. No pertinent family history.    Current Facility-Administered Medications   Medication Dose Route Frequency    acetaminophen (TYLENOL) tablet 650 mg  650 mg Oral Q6H PRN    Or    acetaminophen (TYLENOL) suppository 650 mg  650 mg Rectal Q6H PRN    [START ON 1/14/2022] dexamethasone (PF) (DECADRON) 10 mg/mL injection 6 mg  6 mg IntraVENous Q24H    enoxaparin (LOVENOX) injection 100 mg  1 mg/kg SubCUTAneous Q12H    guaiFENesin-dextromethorphan (ROBITUSSIN DM) 100-10 mg/5 mL syrup 5 mL  5 mL Oral Q4H PRN    ascorbic acid (vitamin C) (VITAMIN C) tablet 250 mg  250 mg Oral DAILY    [START ON 1/14/2022] zinc sulfate (ZINCATE) 50 mg zinc (220 mg) capsule 1 Capsule  1 Capsule Oral DAILY    melatonin (rapid dissolve) tablet 10 mg  10 mg Oral QHS    bumetanide (BUMEX) injection 1 mg  1 mg IntraVENous Q12H    pantoprazole (PROTONIX) 40 mg in 0.9% sodium chloride 10 mL injection  40 mg IntraVENous Q12H    sodium chloride (NS) flush 5-40 mL  5-40 mL IntraVENous Q8H    sodium chloride (NS) flush 5-40 mL  5-40 mL IntraVENous PRN    acetaminophen (TYLENOL) tablet 650 mg  650 mg Oral Q6H PRN    Or    acetaminophen (TYLENOL) suppository 650 mg  650 mg Rectal Q6H PRN    polyethylene glycol (MIRALAX) packet 17 g  17 g Oral DAILY PRN    ondansetron (ZOFRAN ODT) tablet 4 mg  4 mg Oral Q8H PRN    Or    ondansetron (ZOFRAN) injection 4 mg  4 mg IntraVENous Q6H PRN     Current Outpatient Medications   Medication Sig    carvediloL (COREG) 6.25 mg tablet Take 6.25 mg by mouth two (2) times a day. Review of Symptoms:  Review of Systems   Constitutional: Negative for activity change and fever. HENT: Negative for congestion and sore throat. Eyes: Negative for discharge. Respiratory: Positive for shortness of breath. Negative for apnea. Cardiovascular: Negative for chest pain. Gastrointestinal: Negative for abdominal distention. Genitourinary: Negative for dysuria and flank pain. Musculoskeletal: Negative for arthralgias. Skin: Negative for rash. Neurological: Negative for dizziness and weakness. Hematological: Negative for adenopathy. Psychiatric/Behavioral: Negative for agitation. All other systems reviewed and are negative.       Subjective: Zhane Cortez is a 61-year-old female with past medical history of obesity, congestive heart failure, hypertension presents for evaluation of shortness of breath. Patient started having shortness of breath at 10:00 last night. Patient states that this is worsened when she lays flat.   On EMS arrival, patient was found to be 94% on room air, though tachypneic. She was given albuterol by EMS with significant improvement in her symptoms. She has recently had flulike symptoms, she has previously been vaccinated for COVID but has not received her booster. She denies any increased lower extremity swelling, nausea, vomiting, abdominal pain. There is no chest pain. .  Cardiac risk factors: extant. Physical Exam    Visit Vitals  BP (!) 156/122   Pulse 84   Temp 97.7 °F (36.5 °C)   Resp 16   Ht 5' 6\" (1.676 m)   Wt 97.5 kg (215 lb)   SpO2 96%   BMI 34.70 kg/m²     Constitutional: Non toxic appearing, moderate distress  Head: Normocephalic, Atraumatic  Eyes: EOMI  Neck: Supple  Cardiovascular: Regular rate and rhythm, no murmurs, rubs, or gallops  Chest: Normal work of breathing and chest excursion bilaterally  Lungs:  Tachypnea, no wheezing, minimally prolonged expiratory phase with diminished lung sounds in the right lung base  Abdomen: Soft, non tender, non distended, normoactive bowel sounds  Back: No evidence of trauma or deformity  Extremities: No evidence of trauma or deformity, nonpitting edema to bilateral lower extremities  Skin: Warm and dry, normal cap refill  Neuro: Alert and appropriate, CN intact, normal speech, strength and sensation full and symmetric bilaterally, normal coordination  Psychiatric: Normal mood and affect                                  Cardiographics    Telemetry: Sinus  ECG: No acute change  Echocardiogram: Pending    Recent radiology, intake/output and wt reviewed    Labs:   Recent Results (from the past 48 hour(s))   EKG, 12 LEAD, INITIAL    Collection Time: 01/13/22  7:15 AM   Result Value Ref Range    Ventricular Rate 98 BPM    Atrial Rate 98 BPM    P-R Interval 158 ms    QRS Duration 116 ms    Q-T Interval 396 ms    QTC Calculation (Bezet) 505 ms    Calculated P Axis 30 degrees    Calculated R Axis -23 degrees    Calculated T Axis 136 degrees    Diagnosis       Sinus rhythm with occasional premature ventricular complexes  Possible Left atrial enlargement  Left ventricular hypertrophy with QRS widening and repolarization abnormality   ( R in aVL , Sokolow-Waggoner , Neftali product )  Anteroseptal infarct , age undetermined  Abnormal ECG  No previous ECGs available  Confirmed by Aleja Joel MD, -- ([id number]) on 1/13/2022 8:55:22 AM     CBC WITH AUTOMATED DIFF    Collection Time: 01/13/22  7:23 AM   Result Value Ref Range    WBC 6.0 4.6 - 13.2 K/uL    RBC 4.11 (L) 4.20 - 5.30 M/uL    HGB 11.9 (L) 12.0 - 16.0 g/dL    HCT 39.9 35.0 - 45.0 %    MCV 97.1 78.0 - 100.0 FL    MCH 29.0 24.0 - 34.0 PG    MCHC 29.8 (L) 31.0 - 37.0 g/dL    RDW 15.5 (H) 11.6 - 14.5 %    PLATELET 952 803 - 069 K/uL    MPV 11.8 9.2 - 11.8 FL    NRBC 0.0 0  WBC    ABSOLUTE NRBC 0.00 0.00 - 0.01 K/uL    NEUTROPHILS 76 (H) 40 - 73 %    LYMPHOCYTES 17 (L) 21 - 52 %    MONOCYTES 6 3 - 10 %    EOSINOPHILS 1 0 - 5 %    BASOPHILS 0 0 - 2 %    IMMATURE GRANULOCYTES 0 0.0 - 0.5 %    ABS. NEUTROPHILS 4.5 1.8 - 8.0 K/UL    ABS. LYMPHOCYTES 1.0 0.9 - 3.6 K/UL    ABS. MONOCYTES 0.3 0.05 - 1.2 K/UL    ABS. EOSINOPHILS 0.1 0.0 - 0.4 K/UL    ABS. BASOPHILS 0.0 0.0 - 0.1 K/UL    ABS. IMM. GRANS. 0.0 0.00 - 0.04 K/UL    DF AUTOMATED     METABOLIC PANEL, COMPREHENSIVE    Collection Time: 01/13/22  7:23 AM   Result Value Ref Range    Sodium 147 (H) 136 - 145 mmol/L    Potassium 3.8 3.5 - 5.5 mmol/L    Chloride 114 (H) 100 - 111 mmol/L    CO2 25 21 - 32 mmol/L    Anion gap 8 3.0 - 18 mmol/L    Glucose 99 74 - 99 mg/dL    BUN 27 (H) 7.0 - 18 MG/DL    Creatinine 1.74 (H) 0.6 - 1.3 MG/DL    BUN/Creatinine ratio 16 12 - 20      GFR est AA 36 (L) >60 ml/min/1.73m2    GFR est non-AA 29 (L) >60 ml/min/1.73m2    Calcium 9.4 8.5 - 10.1 MG/DL    Bilirubin, total 0.6 0.2 - 1.0 MG/DL    ALT (SGPT) 35 13 - 56 U/L    AST (SGOT) 28 10 - 38 U/L    Alk.  phosphatase 91 45 - 117 U/L    Protein, total 7.2 6.4 - 8.2 g/dL    Albumin 3.4 3.4 - 5.0 g/dL    Globulin 3.8 2.0 - 4.0 g/dL    A-G Ratio 0.9 0.8 - 1.7     NT-PRO BNP    Collection Time: 01/13/22  7:23 AM   Result Value Ref Range    NT pro-BNP 7,386 (H) 0 - 900 PG/ML   TROPONIN-HIGH SENSITIVITY    Collection Time: 01/13/22  7:23 AM   Result Value Ref Range    Troponin-High Sensitivity 47 0 - 54 ng/L   D DIMER    Collection Time: 01/13/22  7:23 AM   Result Value Ref Range    D DIMER 1.60 (H) <0.46 ug/ml(FEU)   SARS-COV-2    Collection Time: 01/13/22  7:25 AM   Result Value Ref Range    SARS-CoV-2 Please find results under separate order     COVID-19 RAPID TEST    Collection Time: 01/13/22  7:25 AM   Result Value Ref Range    Specimen source Nasopharyngeal      COVID-19 rapid test Detected (AA) NOTD     BLOOD GAS, ARTERIAL POC    Collection Time: 01/13/22  7:50 AM   Result Value Ref Range    Device: NASAL CANNULA      FIO2 (POC) 36 %    pH (POC) 7.36 7.35 - 7.45      pCO2 (POC) 39.3 35.0 - 45.0 MMHG    pO2 (POC) 84 80 - 100 MMHG    HCO3 (POC) 22.1 22 - 26 MMOL/L    sO2 (POC) 95.8 92 - 97 %    Base deficit (POC) 3.1 mmol/L    Allens test (POC) Positive      Site RIGHT RADIAL      Specimen type (POC) ARTERIAL      Performed by Courtney Medina MD

## 2022-01-13 NOTE — ED TRIAGE NOTES
Pt arrives to ED via EMS from home with c/o shortness of breath since 2200 last night. Pt reports hx of CHF, presents with labored breathing and states she cannot catch her breath.

## 2022-01-14 ENCOUNTER — APPOINTMENT (OUTPATIENT)
Dept: NON INVASIVE DIAGNOSTICS | Age: 67
End: 2022-01-14
Attending: INTERNAL MEDICINE
Payer: MEDICARE

## 2022-01-14 PROBLEM — R09.02 HYPOXIA: Status: ACTIVE | Noted: 2022-01-14

## 2022-01-14 PROBLEM — R77.8 ELEVATED TROPONIN: Status: ACTIVE | Noted: 2022-01-14

## 2022-01-14 PROBLEM — J96.01 ACUTE RESPIRATORY FAILURE WITH HYPOXIA (HCC): Status: RESOLVED | Noted: 2018-06-05 | Resolved: 2022-01-14

## 2022-01-14 PROBLEM — J12.82 PNEUMONIA DUE TO COVID-19 VIRUS: Status: ACTIVE | Noted: 2022-01-13

## 2022-01-14 LAB
25(OH)D3 SERPL-MCNC: 56.6 NG/ML (ref 30–100)
ALBUMIN SERPL-MCNC: 3 G/DL (ref 3.4–5)
ALBUMIN/GLOB SERPL: 0.9 {RATIO} (ref 0.8–1.7)
ALP SERPL-CCNC: 76 U/L (ref 45–117)
ALT SERPL-CCNC: 35 U/L (ref 13–56)
ANION GAP SERPL CALC-SCNC: 8 MMOL/L (ref 3–18)
APTT PPP: 41 SEC (ref 23–36.4)
AST SERPL-CCNC: 38 U/L (ref 10–38)
BASOPHILS # BLD: 0 K/UL (ref 0–0.1)
BASOPHILS NFR BLD: 0 % (ref 0–2)
BILIRUB SERPL-MCNC: 0.4 MG/DL (ref 0.2–1)
BUN SERPL-MCNC: 32 MG/DL (ref 7–18)
BUN/CREAT SERPL: 15 (ref 12–20)
CALCIUM SERPL-MCNC: 8.4 MG/DL (ref 8.5–10.1)
CHLORIDE SERPL-SCNC: 111 MMOL/L (ref 100–111)
CO2 SERPL-SCNC: 27 MMOL/L (ref 21–32)
CREAT SERPL-MCNC: 2.12 MG/DL (ref 0.6–1.3)
CRP SERPL-MCNC: 1.2 MG/DL (ref 0–0.3)
D DIMER PPP FEU-MCNC: 1.57 UG/ML(FEU)
DIFFERENTIAL METHOD BLD: ABNORMAL
ECHO AO ROOT DIAM: 3.2 CM
ECHO AO ROOT INDEX: 1.55 CM/M2
ECHO AV AREA PEAK VELOCITY: 3 CM2
ECHO AV AREA PEAK VELOCITY: 3 CM2
ECHO AV AREA VTI: 3.8 CM2
ECHO AV AREA VTI: 3.8 CM2
ECHO AV MEAN GRADIENT: 3 MMHG
ECHO AV MEAN VELOCITY: 0.8 M/S
ECHO AV PEAK GRADIENT: 7 MMHG
ECHO AV PEAK VELOCITY: 1.3 M/S
ECHO AV VELOCITY RATIO: 0.54
ECHO AV VTI: 22.3 CM
ECHO LA DIAMETER INDEX: 2.52 CM/M2
ECHO LA DIAMETER: 5.2 CM
ECHO LA TO AORTIC ROOT RATIO: 1.63
ECHO LA VOL 2C: 125 ML (ref 22–52)
ECHO LA VOL 4C: 85 ML (ref 22–52)
ECHO LA VOL BP: 112 ML (ref 22–52)
ECHO LA VOL/BSA BIPLANE: 54 ML/M2 (ref 16–34)
ECHO LA VOLUME AREA LENGTH: 120 ML
ECHO LA VOLUME INDEX A2C: 61 ML/M2 (ref 16–34)
ECHO LA VOLUME INDEX A4C: 41 ML/M2 (ref 16–34)
ECHO LA VOLUME INDEX AREA LENGTH: 58 ML/M2 (ref 16–34)
ECHO LV E' LATERAL VELOCITY: 4 CM/S
ECHO LV E' SEPTAL VELOCITY: 7 CM/S
ECHO LV EDV A2C: 145 ML
ECHO LV EDV A4C: 94 ML
ECHO LV EDV BP: 124 ML (ref 56–104)
ECHO LV EDV INDEX A4C: 46 ML/M2
ECHO LV EDV INDEX BP: 60 ML/M2
ECHO LV EDV NDEX A2C: 70 ML/M2
ECHO LV EJECTION FRACTION A2C: 43 %
ECHO LV EJECTION FRACTION A4C: 41 %
ECHO LV EJECTION FRACTION BIPLANE: 40 % (ref 55–100)
ECHO LV ESV A2C: 83 ML
ECHO LV ESV A4C: 55 ML
ECHO LV ESV BP: 74 ML (ref 19–49)
ECHO LV ESV INDEX A2C: 40 ML/M2
ECHO LV ESV INDEX A4C: 27 ML/M2
ECHO LV ESV INDEX BP: 36 ML/M2
ECHO LV FRACTIONAL SHORTENING: 30 % (ref 28–44)
ECHO LV GLOBAL LONGITUDINAL STRAIN (GLS): -7.3 %
ECHO LV INTERNAL DIMENSION DIASTOLE INDEX: 2.91 CM/M2
ECHO LV INTERNAL DIMENSION DIASTOLIC: 6 CM (ref 3.9–5.3)
ECHO LV INTERNAL DIMENSION SYSTOLIC INDEX: 2.04 CM/M2
ECHO LV INTERNAL DIMENSION SYSTOLIC: 4.2 CM
ECHO LV IVSD: 1.5 CM (ref 0.6–0.9)
ECHO LV MASS 2D: 468.8 G (ref 67–162)
ECHO LV MASS INDEX 2D: 227.6 G/M2 (ref 43–95)
ECHO LV POSTERIOR WALL DIASTOLIC: 1.7 CM (ref 0.6–0.9)
ECHO LV RELATIVE WALL THICKNESS RATIO: 0.57
ECHO LVOT AREA: 5.3 CM2
ECHO LVOT AV VTI INDEX: 0.7
ECHO LVOT DIAM: 2.6 CM
ECHO LVOT MEAN GRADIENT: 1 MMHG
ECHO LVOT PEAK GRADIENT: 2 MMHG
ECHO LVOT PEAK VELOCITY: 0.7 M/S
ECHO LVOT STROKE VOLUME INDEX: 40.2 ML/M2
ECHO LVOT SV: 82.8 ML
ECHO LVOT VTI: 15.6 CM
ECHO MV A VELOCITY: 0.61 M/S
ECHO MV E DECELERATION TIME (DT): 109.3 MS
ECHO MV E VELOCITY: 1.02 M/S
ECHO MV E/A RATIO: 1.67
ECHO MV E/E' LATERAL: 25.5
ECHO MV E/E' RATIO (AVERAGED): 20.04
ECHO MV E/E' SEPTAL: 14.57
ECHO PULMONARY ARTERY END DIASTOLIC PRESSURE: 4 MMHG
ECHO PV REGURGITANT MAX VELOCITY: 1 M/S
ECHO RV FREE WALL PEAK S': 10 CM/S
ECHO RV INTERNAL DIMENSION: 3.2 CM
ECHO RV TAPSE: 1.7 CM (ref 1.5–2)
EOSINOPHIL # BLD: 0 K/UL (ref 0–0.4)
EOSINOPHIL NFR BLD: 0 % (ref 0–5)
ERYTHROCYTE [DISTWIDTH] IN BLOOD BY AUTOMATED COUNT: 15.7 % (ref 11.6–14.5)
FIBRINOGEN PPP-MCNC: 393 MG/DL (ref 210–451)
GLOBULIN SER CALC-MCNC: 3.5 G/DL (ref 2–4)
GLUCOSE SERPL-MCNC: 98 MG/DL (ref 74–99)
HCT VFR BLD AUTO: 35.4 % (ref 35–45)
HGB BLD-MCNC: 10.6 G/DL (ref 12–16)
IMM GRANULOCYTES # BLD AUTO: 0 K/UL
IMM GRANULOCYTES NFR BLD AUTO: 0 %
INR PPP: 1.2 (ref 0.8–1.2)
LACTATE SERPL-SCNC: 0.9 MMOL/L (ref 0.4–2)
LYMPHOCYTES # BLD: 0.8 K/UL (ref 0.9–3.6)
LYMPHOCYTES NFR BLD: 17 % (ref 21–52)
MAGNESIUM SERPL-MCNC: 2.2 MG/DL (ref 1.6–2.6)
MCH RBC QN AUTO: 28.5 PG (ref 24–34)
MCHC RBC AUTO-ENTMCNC: 29.9 G/DL (ref 31–37)
MCV RBC AUTO: 95.2 FL (ref 78–100)
MONOCYTES # BLD: 0.3 K/UL (ref 0.05–1.2)
MONOCYTES NFR BLD: 6 % (ref 3–10)
NEUTS SEG # BLD: 3.5 K/UL (ref 1.8–8)
NEUTS SEG NFR BLD: 77 % (ref 40–73)
NRBC # BLD: 0 K/UL (ref 0–0.01)
NRBC BLD-RTO: 0 PER 100 WBC
PLATELET # BLD AUTO: 245 K/UL (ref 135–420)
PLATELET COMMENTS,PCOM: ABNORMAL
PMV BLD AUTO: 11.5 FL (ref 9.2–11.8)
POTASSIUM SERPL-SCNC: 4.1 MMOL/L (ref 3.5–5.5)
PROT SERPL-MCNC: 6.5 G/DL (ref 6.4–8.2)
PROTHROMBIN TIME: 14.5 SEC (ref 11.5–15.2)
RBC # BLD AUTO: 3.72 M/UL (ref 4.2–5.3)
RBC MORPH BLD: ABNORMAL
SARS-COV-2, NAA: DETECTED
SODIUM SERPL-SCNC: 146 MMOL/L (ref 136–145)
TROPONIN-HIGH SENSITIVITY: 3695 NG/L (ref 0–54)
WBC # BLD AUTO: 4.6 K/UL (ref 4.6–13.2)

## 2022-01-14 PROCEDURE — C9113 INJ PANTOPRAZOLE SODIUM, VIA: HCPCS | Performed by: INTERNAL MEDICINE

## 2022-01-14 PROCEDURE — 96375 TX/PRO/DX INJ NEW DRUG ADDON: CPT

## 2022-01-14 PROCEDURE — G0378 HOSPITAL OBSERVATION PER HR: HCPCS

## 2022-01-14 PROCEDURE — 85384 FIBRINOGEN ACTIVITY: CPT

## 2022-01-14 PROCEDURE — 85379 FIBRIN DEGRADATION QUANT: CPT

## 2022-01-14 PROCEDURE — 96376 TX/PRO/DX INJ SAME DRUG ADON: CPT

## 2022-01-14 PROCEDURE — 77010033678 HC OXYGEN DAILY

## 2022-01-14 PROCEDURE — 74011250637 HC RX REV CODE- 250/637: Performed by: HOSPITALIST

## 2022-01-14 PROCEDURE — 83605 ASSAY OF LACTIC ACID: CPT

## 2022-01-14 PROCEDURE — 74011250636 HC RX REV CODE- 250/636: Performed by: HOSPITALIST

## 2022-01-14 PROCEDURE — 99232 SBSQ HOSP IP/OBS MODERATE 35: CPT | Performed by: INTERNAL MEDICINE

## 2022-01-14 PROCEDURE — 74011000250 HC RX REV CODE- 250: Performed by: INTERNAL MEDICINE

## 2022-01-14 PROCEDURE — 83735 ASSAY OF MAGNESIUM: CPT

## 2022-01-14 PROCEDURE — 93306 TTE W/DOPPLER COMPLETE: CPT

## 2022-01-14 PROCEDURE — 74011250636 HC RX REV CODE- 250/636: Performed by: INTERNAL MEDICINE

## 2022-01-14 PROCEDURE — 86140 C-REACTIVE PROTEIN: CPT

## 2022-01-14 PROCEDURE — 85730 THROMBOPLASTIN TIME PARTIAL: CPT

## 2022-01-14 PROCEDURE — 36415 COLL VENOUS BLD VENIPUNCTURE: CPT

## 2022-01-14 PROCEDURE — 85610 PROTHROMBIN TIME: CPT

## 2022-01-14 PROCEDURE — 65660000000 HC RM CCU STEPDOWN

## 2022-01-14 PROCEDURE — 85025 COMPLETE CBC W/AUTO DIFF WBC: CPT

## 2022-01-14 PROCEDURE — 82306 VITAMIN D 25 HYDROXY: CPT

## 2022-01-14 PROCEDURE — 83529 ASAY OF INTERLEUKIN-6 (IL-6): CPT

## 2022-01-14 PROCEDURE — 84484 ASSAY OF TROPONIN QUANT: CPT

## 2022-01-14 PROCEDURE — 80053 COMPREHEN METABOLIC PANEL: CPT

## 2022-01-14 PROCEDURE — 74011250637 HC RX REV CODE- 250/637: Performed by: INTERNAL MEDICINE

## 2022-01-14 RX ORDER — ENOXAPARIN SODIUM 100 MG/ML
1 INJECTION SUBCUTANEOUS EVERY 12 HOURS
Status: DISCONTINUED | OUTPATIENT
Start: 2022-01-14 | End: 2022-01-14

## 2022-01-14 RX ORDER — ENOXAPARIN SODIUM 150 MG/ML
1.5 INJECTION SUBCUTANEOUS EVERY 24 HOURS
Status: DISCONTINUED | OUTPATIENT
Start: 2022-01-14 | End: 2022-01-15

## 2022-01-14 RX ORDER — CARVEDILOL 3.12 MG/1
6.25 TABLET ORAL 2 TIMES DAILY
Status: DISCONTINUED | OUTPATIENT
Start: 2022-01-14 | End: 2022-01-16 | Stop reason: HOSPADM

## 2022-01-14 RX ORDER — ALBUTEROL SULFATE 90 UG/1
1 AEROSOL, METERED RESPIRATORY (INHALATION)
Status: DISCONTINUED | OUTPATIENT
Start: 2022-01-14 | End: 2022-01-16 | Stop reason: HOSPADM

## 2022-01-14 RX ADMIN — ISOSORBIDE DINITRATE 30 MG: 20 TABLET ORAL at 08:46

## 2022-01-14 RX ADMIN — HYDRALAZINE HYDROCHLORIDE 25 MG: 25 TABLET, FILM COATED ORAL at 16:00

## 2022-01-14 RX ADMIN — SODIUM CHLORIDE, PRESERVATIVE FREE 10 ML: 5 INJECTION INTRAVENOUS at 22:22

## 2022-01-14 RX ADMIN — BUDESONIDE AND FORMOTEROL FUMARATE DIHYDRATE 2 PUFF: 80; 4.5 AEROSOL RESPIRATORY (INHALATION) at 22:39

## 2022-01-14 RX ADMIN — SODIUM CHLORIDE, PRESERVATIVE FREE 10 ML: 5 INJECTION INTRAVENOUS at 13:19

## 2022-01-14 RX ADMIN — DEXAMETHASONE SODIUM PHOSPHATE 6 MG: 10 INJECTION, SOLUTION INTRAMUSCULAR; INTRAVENOUS at 12:23

## 2022-01-14 RX ADMIN — HYDRALAZINE HYDROCHLORIDE 25 MG: 25 TABLET, FILM COATED ORAL at 08:46

## 2022-01-14 RX ADMIN — CLOPIDOGREL BISULFATE 75 MG: 75 TABLET ORAL at 08:46

## 2022-01-14 RX ADMIN — BUMETANIDE 1 MG: 0.25 INJECTION, SOLUTION INTRAMUSCULAR; INTRAVENOUS at 22:19

## 2022-01-14 RX ADMIN — SODIUM CHLORIDE, PRESERVATIVE FREE 10 ML: 5 INJECTION INTRAVENOUS at 22:00

## 2022-01-14 RX ADMIN — ZINC SULFATE 220 MG (50 MG) CAPSULE 1 CAPSULE: CAPSULE at 08:46

## 2022-01-14 RX ADMIN — ATORVASTATIN CALCIUM 40 MG: 20 TABLET, FILM COATED ORAL at 22:19

## 2022-01-14 RX ADMIN — CARVEDILOL 6.25 MG: 3.12 TABLET, FILM COATED ORAL at 22:19

## 2022-01-14 RX ADMIN — ISOSORBIDE DINITRATE 30 MG: 20 TABLET ORAL at 22:19

## 2022-01-14 RX ADMIN — Medication 250 MG: at 08:46

## 2022-01-14 RX ADMIN — ALBUTEROL SULFATE 1 PUFF: 108 INHALANT RESPIRATORY (INHALATION) at 20:00

## 2022-01-14 RX ADMIN — HYDRALAZINE HYDROCHLORIDE 25 MG: 25 TABLET, FILM COATED ORAL at 22:19

## 2022-01-14 RX ADMIN — SODIUM CHLORIDE, PRESERVATIVE FREE 40 MG: 5 INJECTION INTRAVENOUS at 08:47

## 2022-01-14 RX ADMIN — Medication 10 MG: at 22:19

## 2022-01-14 RX ADMIN — BUMETANIDE 1 MG: 0.25 INJECTION, SOLUTION INTRAMUSCULAR; INTRAVENOUS at 08:46

## 2022-01-14 RX ADMIN — ENOXAPARIN SODIUM 140 MG: 150 INJECTION SUBCUTANEOUS at 12:23

## 2022-01-14 NOTE — PROGRESS NOTES
Dr Evan Chavez informed me about this consult    Brief remote note full consult note to follow in morning : She is patient's of Dr Dinesh Day with h/o CKD admitted with CHF/hypoxia and Covid infection  Creatinine is slightly rising, does not have hyperkalemia and acidosis  Continue bumex for volume management   USG retroperitoneum   Urine electrolytes and UPC,   Avoid NSAIDS, adjust anti hypertensive for goal BP less than 140/90 mmhg   Could add amlodipine if BP remained elevated   Dose adjust all meds for current eGFR    [facility]- Nephrology

## 2022-01-14 NOTE — PROGRESS NOTES
Palliative Medicine     Consult received. Chart review in progress. Thank you for the Palliative Medicine consult and allowing us to participate in the care of Ms. Nazario. Will continue to monitor and provide support.     Shi NAYAKN, RN, [facility]  Palliative Medicine Inpatient   Palliative COPE Line: [phone]

## 2022-01-14 NOTE — PROGRESS NOTES
Hospitalist Progress Note-critical care note     Patient: Lisa Brooks MRN: [id number]  CSN: [id number]    YOB: 1955  Age: 74 y.o.   Sex: female    DOA: 1/13/2022 LOS:  LOS: 1 day            Chief complaint: Hypoxia, elevated troponin, pneumonia due to COVID-19 virus, CHF exacerbation, CKD 4, hypoxia    Assessment/Plan         Hospital Problems  Date Reviewed: 1/13/2022          Codes Class Noted POA    Elevated troponin ICD-10-CM: R77.8  ICD-9-CM: 790.6  1/14/2022 Unknown        Hypoxia ICD-10-CM: R09.02  ICD-9-CM: 799.02  1/14/2022 Unknown        * (Principal) Pneumonia due to COVID-19 virus ICD-10-CM: U07.1, J12.82  ICD-9-CM: 480.8, 079.89  1/13/2022 Unknown        CHF exacerbation ([facility] ICD-10-CM: I50.9  ICD-9-CM: 428.0  10/23/2021 Yes        Stage 4 chronic kidney disease ([facility] ICD-10-CM: N18.4  ICD-9-CM: 585.4  12/12/2013 Yes        Essential hypertension ICD-10-CM: I10  ICD-9-CM: 401.9  11/6/2013 Yes              hypoxia   Due to chf and covid 19 pna   Continue oxygen at 2 L AM, will increase as needed   Due to covid 19   cta chest /v/q scan no PE      pna due to COVID   Continue  Dexamethasone with breathing tx and  vitamin cocktail  No pe and no dvt from studies on 1/13   She would be a fair candidate for remdesivir however she has a creatinine clearance of 28  ID f/u      Congestive heart failure   continued on IV Bumex   Echo is ordered  Cardiology is consulted    And elevated trop   Will see the trend-dr. Mandy Schultz f/u   Change lovenox dose      Stage III-4  renal disease  She was admitted in October with renal failure and CHF I will have nephrology consult to help out with management of diuretics as well in the setting of COVID and borderline CHF     Asthma   she is continued on her inhalers   on steroids     History of TIA  She is continuing her Plavix and statin      Remote history of GI bleed    on Protonix      Subjective: I feel tired, not sleeping well last night    Pt is seen and examed in full PPE     35 total min's spent on patient care including >50% on counseling/coordinating care. Due to comorbidity, her prognosis is guarded, will have palliative care team on board for code status discussion      Disposition :tbd,   Review of systems:    General: No fevers or chills. Cardiovascular: No chest pain or pressure. No palpitations. Pulmonary: +shortness of breath. Gastrointestinal: No nausea, vomiting. Vital signs/Intake and Output:  Visit Vitals  BP (!) 136/96   Pulse 77   Temp 96.8 °F (36 °C)   Resp 22   Ht 5' 6\" (1.676 m)   Wt 92.5 kg (203 lb 14.4 oz)   SpO2 100%   BMI 32.91 kg/m²     Current Shift:  No intake/output data recorded. Last three shifts:  01/12 1901 - 01/14 0700  In: -   Out: 1900 [Urine:1900]    Physical Exam:  General: WD, WN. Alert, cooperative, mild distress   HEENT: NC, Atraumatic. PERRLA, anicteric sclerae. Lungs: CTA Bilaterally. No Wheezing/Rhonchi/Rales. Heart:  Regular  rhythm,  No murmur, No Rubs, No Gallops  Abdomen: Soft, Non distended, Non tender. +Bowel sounds,   Extremities: No c/c/e  Psych:   Not anxious or agitated. Neurologic:  No acute neurological deficit. Labs: Results:       Chemistry Recent Labs     01/14/22  0612 01/13/22  0723   GLU 98 99   * 147*   K 4.1 3.8    114*   CO2 27 25   BUN 32* 27*   CREA 2.12* 1.74*   CA 8.4* 9.4   AGAP 8 8   BUCR 15 16   AP 76 91   TP 6.5 7.2   ALB 3.0* 3.4   GLOB 3.5 3.8   AGRAT 0.9 0.9      CBC w/Diff Recent Labs     01/14/22  0612 01/13/22  0723   WBC 4.6 6.0   RBC 3.72* 4.11*   HGB 10.6* 11.9*   HCT 35.4 39.9    243   GRANS 77* 76*   LYMPH 17* 17*   EOS 0 1      Cardiac Enzymes No results for input(s): CPK, CKND1, BEBA in the last 72 hours.     No lab exists for component: CKRMB, TROIP   Coagulation Recent Labs     01/14/22  0612   PTP 14.5   INR 1.2   APTT 41.0*       Lipid Panel No results found for: CHOL, CHOLPOCT, CHOLX, CHLST, CHOLV, 021129, HDL, HDLP, LDL, LDLC, DLDLP, 103865, VLDLC, VLDL, TGLX, TRIGL, TRIGP, TGLPOCT, CHHD, CHHDX   BNP No results for input(s): BNPP in the last 72 hours. Liver Enzymes Recent Labs     01/14/22  0612   TP 6.5   ALB 3.0*   AP 76      Thyroid Studies No results found for: T4, T3U, TSH, TSHEXT, TSHEXT     Procedures/imaging: see electronic medical records for all procedures/Xrays and details which were not copied into this note but were reviewed prior to creation of Plan    NM LUNG SCAN PERF    Result Date: 1/13/2022  EXAM: NM LUNG SCAN PERF. CLINICAL INDICATION/HISTORY: r/o pe. -Additional: Clinical concern for pulmonary embolus. COMPARISON: Correlation is made with the radiographic study performed the same day. TECHNIQUE: 6.2 mCi Tc-99m MAA was injected intravenously and the 8 standard views of the chest were obtained. This perfusion only examination is interpreted using the PISAPED criteria. _______________ FINDINGS: Perfusion images show relatively symmetric radiotracer distribution to both lungs, with no moderate or large sized segmental perfusion defects to suggest the presence of pulmonary embolism. Diminished uptake at the right bases secondary to attenuation from an elevated right hemidiaphragm. _______________     No scintigraphic findings to suggest the presence of acute pulmonary embolism. _______________      CTA CHEST W OR W WO CONT    Result Date: 1/13/2022  EXAM: CTA Chest CLINICAL INDICATION/HISTORY: Shortness of breath, Covid positive. Possible PE. COMPARISON: Plain film chest same day TECHNIQUE: Axial CT imaging from the thoracic inlet through the diaphragm with intravenous contrast utilizing CTA study for pulmonary artery evaluation. Coronal and sagittal MIP reformations were generated at a separate workstation. One or more dose reduction techniques were used on this CT: automated exposure control, adjustment of the mAs and/or kVp according to patient's size, and iterative reconstruction techniques.  The specific techniques utilized on this CT exam have been documented in the patient's electronic medical record. Digital imaging and communications and medicine (DICOM) format image data are available to nonaffiliated external healthcare facilities or entities on a secure, media free, reciprocally searchable basis with patient authorization for at least a 12 month period after this study. _______________ FINDINGS: EXAM QUALITY: Overall exam quality is adequate. Pulmonary arterial enhancement is adequate. The breath hold is satisfactory. Respiratory motion artifact is present, limiting evaluation of peripheral vessels. PULMONARY ARTERIES: No convincing evidence of pulmonary embolism. MEDIASTINUM: Mild diffuse cardiomegaly with coronary artery calcifications with no pericardial effusion. Mild atherosclerotic changes and ectasia thoracic aorta. LYMPH NODES: Borderline enlarged mediastinal lymph nodes maximally 12 mm AP window short axis dimension. AIRWAY: Unremarkable. LUNGS: Mild partial atelectasis bilateral lower lobes and mild small peripheral areas of groundglass opacity bilateral upper lobes as well as small bandlike atelectasis or scarring right upper lobe. No abnormal mass. PLEURA: No pleural effusion or pneumothorax. UPPER ABDOMEN: Reflux of contrast into the IVC and hepatic veins. Several nonobstructing right renal calculi the largest right lower pole maximally 12 mm. Several small nonobstructing left renal calculi. . OTHER: No acute or aggressive osseous abnormalities identified. _______________     1. Mildly limited study due to motion obscuring peripheral vessels but no definite evidence of pulmonary embolism. 2. Mild areas of bilateral lower lobe and partial right upper lobe atelectasis. Very mild peripheral nonspecific groundglass opacities bilateral upper lobes which can be related to small airway disease, edema or atypical infection. 3. Cardiomegaly with reflux of contrast into IVC suggesting right heart failure.  4. Incidental bilateral nonobstructing renal calculi. XR CHEST PORT    Result Date: 1/13/2022  EXAM: XR CHEST PORT CLINICAL INDICATION/HISTORY: Shortness of breath -Additional: None COMPARISON: None TECHNIQUE: Portable frontal view of the chest _______________ FINDINGS: SUPPORT DEVICES: None. HEART AND MEDIASTINUM: Cardiac size is borderline enlarged for AP technique. Mediastinal contours appear within normal limits. LUNGS AND PLEURAL SPACES: Rotated nature of the projection foreshortening the left hemithorax. Perihilar areas of opacity are demonstrated with more linear configuration densities at right greater than left lung bases. No pneumothorax or pleural effusion. BONY THORAX AND SOFT TISSUES: Unremarkable. _______________     1. Borderline cardiac enlargement with patchy perihilar and basilar areas of edema/atelectasis     DUPLEX LOWER EXT VENOUS BILAT    Result Date: 1/13/2022  · No evidence of deep vein thrombosis in the right lower extremity. · No evidence of deep vein thrombosis in the left lower extremity.         Piedad Coffey MD

## 2022-01-14 NOTE — CONSULTS
[facility]  (A Division of [address] [place] [address])                                                                                                                      Val Bernabe MD  Office #: - Option # [phone]  Fax #: [phone]     Date of Admission: 1/13/2022Date of Note: 1/14/2022    Reason for Consult: Evaluation and antibiotic management of COVID 19 infection. Thank you for involving me in the care of this patient. Please do not hesitate to contact me on the above number if question or concern. I will be covering over the weekend. Please call for questions/ consults by phone o via [address] [place] [address].       Current Antimicrobials:    Prior Antimicrobials:  N/A    Immunosuppressive drugs: NA       Assessment- ID related:  --------------------------------------------------------------------------  Patient with incomplete Vaccine hx and no booster with:    · COVID 19 infection  · Mild viral pneumonia with patchy GGO on CT chest  --mild Hypoxia: ABG on 36%( 7.36/39/84)  --CT chest --no PE  --V/Q-not suggestive of PE  --PVL- no DVT in LE  · Dyspneia- COVID Vs CHF( Pro-BNP 7386, elevated troponin)  COVID rapid/ RAYRAY +  Procal-None  CRP 1.2-> 1.2  Ferretin-- 97    Other Medical Issues- Mx per respective team:    CKD  Morbidly obese       Recommendation for ID issues I am following:  ------------------------------------------------------------------------------    Low biomarkers- CRP/ferretin-> either early covid or SOB due to other factors such as excerbated CHF-> echo pending    --cont dexamethasone  -remdesivir contra-indicated due to renal failure  --cant offer oral anti-virals-not available as inpatient  --check Procal  -monitor biomarkers/ cbc with diff/cmp dialy  -AC/vitamin coctails/ oxygen support and weaning per primary team    Tociluzimab/ Baricitinab not indicated at this time, will revise if worsening respiratory failure         HPI:  Corie Blanco is a 74 y.o. BLACK/ with PMH as listed who is partially vaccinated for COVID. She had some mild URI symtpoms for 2-3 days, but was in acute shortness of breath of 1 day, that seems to be exacerbatd with lying down. Denies F/C//R/ sorethoat/ chest pain. No one at home with COVID infection. Was tested + for COVID. CT/VQ without PE. biomarkers not elevated and she required only upto 2L of oxygen. She feels better already, she is on bumex. Active Hospital Problems    Diagnosis Date Noted    Elevated troponin 01/14/2022    Hypoxia 01/14/2022    Pneumonia due to COVID-19 virus 01/13/2022    CHF exacerbation ([facility] 10/23/2021    Stage 4 chronic kidney disease ([facility] 12/12/2013    Essential hypertension 11/06/2013     Past Medical History:   Diagnosis Date    CHF (congestive heart failure) ([facility]     Hypertension      History reviewed. No pertinent surgical history. History reviewed. No pertinent family history. Social History     Socioeconomic History    Marital status:      Spouse name: Not on file    Number of children: Not on file    Years of education: Not on file    Highest education level: Not on file   Occupational History    Not on file   Tobacco Use    Smoking status: Never Smoker    Smokeless tobacco: Never Used   Substance and Sexual Activity    Alcohol use: Not Currently    Drug use: Never    Sexual activity: Not on file   Other Topics Concern    Not on file   Social History Narrative    Not on file     Social Determinants of Health     Financial Resource Strain:     Difficulty of Paying Living Expenses: Not on file   Food Insecurity:     Worried About Running Out of Food in the Last Year: Not on file    Ceasar of Food in the Last Year: Not on file   Transportation Needs:     Lack of Transportation (Medical): Not on file    Lack of Transportation (Non-Medical):  Not on file   Physical Activity:     Days of Exercise per Week: Not on file    Minutes of Exercise per Session: Not on file   Stress:     Feeling of Stress : Not on file   Social Connections:     Frequency of Communication with Friends and Family: Not on file    Frequency of Social Gatherings with Friends and Family: Not on file    Attends Orthodoxy Services: Not on file    Active Member of Clubs or Organizations: Not on file    Attends Club or Organization Meetings: Not on file    Marital Status: Not on file   Intimate Partner Violence:     Fear of Current or Ex-Partner: Not on file    Emotionally Abused: Not on file    Physically Abused: Not on file    Sexually Abused: Not on file   Housing Stability:     Unable to Pay for Housing in the Last Year: Not on file    Number of [place] in the Last Year: Not on file    Unstable Housing in the Last Year: Not on file       Allergies:  Patient has no known allergies.      Medications:  Current Facility-Administered Medications   Medication Dose Route Frequency    albuterol (PROVENTIL HFA, VENTOLIN HFA, PROAIR HFA) inhaler 1 Puff  1 Puff Inhalation Q4H RT    enoxaparin (LOVENOX) injection 140 mg  1.5 mg/kg SubCUTAneous Q24H    dexamethasone (PF) (DECADRON) 10 mg/mL injection 6 mg  6 mg IntraVENous Q24H    guaiFENesin-dextromethorphan (ROBITUSSIN DM) 100-10 mg/5 mL syrup 5 mL  5 mL Oral Q4H PRN    ascorbic acid (vitamin C) (VITAMIN C) tablet 250 mg  250 mg Oral DAILY    zinc sulfate (ZINCATE) 50 mg zinc (220 mg) capsule 1 Capsule  1 Capsule Oral DAILY    melatonin (rapid dissolve) tablet 10 mg  10 mg Oral QHS    bumetanide (BUMEX) injection 1 mg  1 mg IntraVENous Q12H    pantoprazole (PROTONIX) 40 mg in 0.9% sodium chloride 10 mL injection  40 mg IntraVENous Q12H    sodium chloride (NS) flush 5-40 mL  5-40 mL IntraVENous Q8H    sodium chloride (NS) flush 5-40 mL  5-40 mL IntraVENous PRN    acetaminophen (TYLENOL) tablet 650 mg  650 mg Oral Q6H PRN    Or    acetaminophen (TYLENOL) suppository 650 mg  650 mg Rectal Q6H PRN    polyethylene glycol (MIRALAX) packet 17 g  17 g Oral DAILY PRN    ondansetron (ZOFRAN ODT) tablet 4 mg  4 mg Oral Q8H PRN    Or    ondansetron (ZOFRAN) injection 4 mg  4 mg IntraVENous Q6H PRN    clopidogreL (PLAVIX) tablet 75 mg  75 mg Oral DAILY    atorvastatin (LIPITOR) tablet 40 mg  40 mg Oral QHS    isosorbide dinitrate (ISORDIL) tablet 30 mg  30 mg Oral BID    hydrALAZINE (APRESOLINE) tablet 25 mg  25 mg Oral TID    hydrALAZINE (APRESOLINE) 20 mg/mL injection 10 mg  10 mg IntraVENous Q6H PRN    budesonide-formoterol (SYMBICORT) 80-4.5 mcg inhaler  2 Puff Inhalation BID RT        ROS:  Pertinent items are noted in the History of Present Illness. Physical Exam:    Temp (24hrs), Av °F (36.1 °C), Min:95.9 °F (35.5 °C), Max:97.9 °F (36.6 °C)    Visit Vitals  /62 (BP 1 Location: Left upper arm, BP Patient Position: At rest)   Pulse 80   Temp 97.2 °F (36.2 °C)   Resp 20   Ht 5' 6\" (1.676 m)   Wt 92.5 kg (203 lb 14.4 oz)   SpO2 99%   BMI 32.91 kg/m²          GEN: WDWN, not in resp distress. HEENT: Unicteric. EOMI intact  CHEST: Non laboured breathing. CTA  CVS:RRR, no mur/gallop  ABD: Obese/soft. Non tender. HOLLIS: Deferred  EXT: No apparent swelling or redness on UE/LE joints. Skin: Dry and intact. No rash, no redness. CNS: A, OX3. Moves all extremity. CN grossly ok. Microbiology  All Micro Results     Procedure Component Value Units Date/Time    SARS-COV-2 BY RAYRAY [[id number]]  (Abnormal) Collected: 22 9797    Order Status: Completed Specimen: Nasopharyngeal Updated: 22 1619     SARS-CoV-2, RAYRAY Detected        Comment: CALLED TO AND CORRECTLY REPEATED BY:   KENISHA AT THE [facility] 2022 0940 TO DARIAN CEDENO This test has been authorized by the FDA under an Emergency Use Authorization (EUA) for use by authorized laboratories. Testing performed by nucleic acid amplification method. CALLED TO AND CORRECTLY REPEATED BY:  GERARDO GILLIAM RN 3N TO  [address] AT 1118 ON 22.          STREP Messi Huerta, URINE [[id number]] Collected: 01/13/22 1348    Order Status: Completed Specimen: Urine, random Updated: 01/13/22 1840     Strep pneumo Ag, urine Negative       LEGIONELLA PNEUMOPHILA AG, URINE [[id number]] Collected: 01/13/22 1347    Order Status: Completed Specimen: Urine, random Updated: 01/13/22 1839     Legionella Ag, urine Negative       CULTURE, RESPIRATORY/SPUTUM/BRONCH Isabel [place] STAIN [[id number]]     Order Status: Sent Specimen: Sputum     COVID-19 RAPID TEST [[id number]]  (Abnormal) Collected: 01/13/22 0725    Order Status: Completed Specimen: Nasopharyngeal Updated: 01/13/22 0824     Specimen source Nasopharyngeal        COVID-19 rapid test Detected        Comment:      The specimen is POSITIVE for SARS-CoV-2, the novel coronavirus associated with COVID-19. This test has been authorized by the FDA under an Emergency Use Authorization (EUA) for use by authorized laboratories. Fact sheet for Healthcare Providers: ConventionUpdate.co.nz  Fact sheet for Patients: ConventionUpdate.co.nz       Methodology: Isothermal Nucleic Acid Amplification  CALLED TO AND CORRECTLY REPEATED BY:  SHANNON WEATHERS ED TO [facility] AT 3517 ON 1/13/22.                 Lab results:    Chemistry  Recent Labs     01/14/22  0612 01/13/22  0723   GLU 98 99   * 147*   K 4.1 3.8    114*   CO2 27 25   BUN 32* 27*   CREA 2.12* 1.74*   CA 8.4* 9.4   AGAP 8 8   BUCR 15 16   AP 76 91   TP 6.5 7.2   ALB 3.0* 3.4   GLOB 3.5 3.8   AGRAT 0.9 0.9       CBC w/ Diff  Recent Labs     01/14/22  0612 01/13/22  0723   WBC 4.6 6.0   RBC 3.72* 4.11*   HGB 10.6* 11.9*   HCT 35.4 39.9    243   GRANS 77* 76*   LYMPH 17* 17*   EOS 0 1       Imaging: report posted below as per radiologist     CXR:     1. Borderline cardiac enlargement with patchy perihilar and basilar areas of  edema/atelectasis     Total duration of time spent with patient interview and exam and discussion of plan of care, review of chart in care everywhere, discussion with medical staff- nursing/attending and additional specialities as indicated: 60 minutes

## 2022-01-14 NOTE — ED NOTES
TRANSFER - OUT REPORT:    Verbal report given to JEFRY Griggs(name) on Nery Khan  being transferred to telemetry(unit) for routine progression of care       Report consisted of patients Situation, Background, Assessment and   Recommendations(SBAR). Information from the following report(s) ED Summary was reviewed with the receiving nurse. Lines:   Peripheral IV 01/13/22 Left Hand (Active)   Hub Color/Line Status Flushed 01/13/22 0713       Peripheral IV 01/13/22 Right Antecubital (Active)   Site Assessment Clean, dry, & intact 01/13/22 0725   Phlebitis Assessment 0 01/13/22 0725   Infiltration Assessment 0 01/13/22 0725   Dressing Status Clean, dry, & intact 01/13/22 0725   Dressing Type Tape;Transparent 01/13/22 0725   Hub Color/Line Status Pink;Flushed 01/13/22 0725   Action Taken Blood drawn 01/13/22 0725        Opportunity for questions and clarification was provided.       Patient transported with:   Registered Nurse

## 2022-01-14 NOTE — PROGRESS NOTES
Cardiology Progress Note      1/14/2022 3:41 PM    Admit Date: 1/13/2022    Admit Diagnosis: COVID [U07.1]      Subjective: Katt Peguero denies chest pain, chest pressure/discomfort.     Visit Vitals  /62 (BP 1 Location: Left upper arm, BP Patient Position: At rest)   Pulse 80   Temp 97.2 °F (36.2 °C)   Resp 20   Ht 5' 6\" (1.676 m)   Wt 92.5 kg (203 lb 14.4 oz)   SpO2 99%   BMI 32.91 kg/m²     Current Facility-Administered Medications   Medication Dose Route Frequency    albuterol (PROVENTIL HFA, VENTOLIN HFA, PROAIR HFA) inhaler 1 Puff  1 Puff Inhalation Q4H RT    enoxaparin (LOVENOX) injection 140 mg  1.5 mg/kg SubCUTAneous Q24H    dexamethasone (PF) (DECADRON) 10 mg/mL injection 6 mg  6 mg IntraVENous Q24H    guaiFENesin-dextromethorphan (ROBITUSSIN DM) 100-10 mg/5 mL syrup 5 mL  5 mL Oral Q4H PRN    ascorbic acid (vitamin C) (VITAMIN C) tablet 250 mg  250 mg Oral DAILY    zinc sulfate (ZINCATE) 50 mg zinc (220 mg) capsule 1 Capsule  1 Capsule Oral DAILY    melatonin (rapid dissolve) tablet 10 mg  10 mg Oral QHS    bumetanide (BUMEX) injection 1 mg  1 mg IntraVENous Q12H    pantoprazole (PROTONIX) 40 mg in 0.9% sodium chloride 10 mL injection  40 mg IntraVENous Q12H    sodium chloride (NS) flush 5-40 mL  5-40 mL IntraVENous Q8H    sodium chloride (NS) flush 5-40 mL  5-40 mL IntraVENous PRN    acetaminophen (TYLENOL) tablet 650 mg  650 mg Oral Q6H PRN    Or    acetaminophen (TYLENOL) suppository 650 mg  650 mg Rectal Q6H PRN    polyethylene glycol (MIRALAX) packet 17 g  17 g Oral DAILY PRN    ondansetron (ZOFRAN ODT) tablet 4 mg  4 mg Oral Q8H PRN    Or    ondansetron (ZOFRAN) injection 4 mg  4 mg IntraVENous Q6H PRN    clopidogreL (PLAVIX) tablet 75 mg  75 mg Oral DAILY    atorvastatin (LIPITOR) tablet 40 mg  40 mg Oral QHS    isosorbide dinitrate (ISORDIL) tablet 30 mg  30 mg Oral BID    hydrALAZINE (APRESOLINE) tablet 25 mg  25 mg Oral TID    hydrALAZINE (APRESOLINE) 20 mg/mL injection 10 mg  10 mg IntraVENous Q6H PRN    budesonide-formoterol (SYMBICORT) 80-4.5 mcg inhaler  2 Puff Inhalation BID RT         Objective:      Physical Exam:  Constitutional: Non toxic appearing, moderate distress  Head: Normocephalic, Atraumatic  Eyes: EOMI  Neck: Supple  Cardiovascular: Regular rate and rhythm, no murmurs, rubs, or gallops  Chest: Normal work of breathing and chest excursion bilaterally  Lungs: Tachypnea, no wheezing, minimally prolonged expiratory phase with diminished lung sounds in the right lung base  Abdomen: Soft, non tender, non distended, normoactive bowel sounds  Back: No evidence of trauma or deformity  Extremities: No evidence of trauma or deformity, nonpitting edema to bilateral lower extremities  Skin: Warm and dry, normal cap refill  Neuro: Alert and appropriate, CN intact, normal speech, strength and sensation full and symmetric bilaterally, normal coordination  Psychiatric: Normal mood and affect              Data Review:   Labs:    Recent Results (from the past 24 hour(s))   CBC WITH AUTOMATED DIFF    Collection Time: 01/14/22  6:12 AM   Result Value Ref Range    WBC 4.6 4.6 - 13.2 K/uL    RBC 3.72 (L) 4.20 - 5.30 M/uL    HGB 10.6 (L) 12.0 - 16.0 g/dL    HCT 35.4 35.0 - 45.0 %    MCV 95.2 78.0 - 100.0 FL    MCH 28.5 24.0 - 34.0 PG    MCHC 29.9 (L) 31.0 - 37.0 g/dL    RDW 15.7 (H) 11.6 - 14.5 %    PLATELET 802 707 - 494 K/uL    MPV 11.5 9.2 - 11.8 FL    NRBC 0.0 0  WBC    ABSOLUTE NRBC 0.00 0.00 - 0.01 K/uL    NEUTROPHILS 77 (H) 40 - 73 %    LYMPHOCYTES 17 (L) 21 - 52 %    MONOCYTES 6 3 - 10 %    EOSINOPHILS 0 0 - 5 %    BASOPHILS 0 0 - 2 %    IMMATURE GRANULOCYTES 0 %    ABS. NEUTROPHILS 3.5 1.8 - 8.0 K/UL    ABS. LYMPHOCYTES 0.8 (L) 0.9 - 3.6 K/UL    ABS. MONOCYTES 0.3 0.05 - 1.2 K/UL    ABS. EOSINOPHILS 0.0 0.0 - 0.4 K/UL    ABS. BASOPHILS 0.0 0.0 - 0.1 K/UL    ABS. IMM.  GRANS. 0.0 K/UL    DF MANUAL      PLATELET COMMENTS ADEQUATE PLATELETS      RBC COMMENTS NORMOCYTIC, NORMOCHROMIC     METABOLIC PANEL, COMPREHENSIVE    Collection Time: 01/14/22  6:12 AM   Result Value Ref Range    Sodium 146 (H) 136 - 145 mmol/L    Potassium 4.1 3.5 - 5.5 mmol/L    Chloride 111 100 - 111 mmol/L    CO2 27 21 - 32 mmol/L    Anion gap 8 3.0 - 18 mmol/L    Glucose 98 74 - 99 mg/dL    BUN 32 (H) 7.0 - 18 MG/DL    Creatinine 2.12 (H) 0.6 - 1.3 MG/DL    BUN/Creatinine ratio 15 12 - 20      GFR est AA 28 (L) >60 ml/min/1.73m2    GFR est non-AA 23 (L) >60 ml/min/1.73m2    Calcium 8.4 (L) 8.5 - 10.1 MG/DL    Bilirubin, total 0.4 0.2 - 1.0 MG/DL    ALT (SGPT) 35 13 - 56 U/L    AST (SGOT) 38 10 - 38 U/L    Alk.  phosphatase 76 45 - 117 U/L    Protein, total 6.5 6.4 - 8.2 g/dL    Albumin 3.0 (L) 3.4 - 5.0 g/dL    Globulin 3.5 2.0 - 4.0 g/dL    A-G Ratio 0.9 0.8 - 1.7     MAGNESIUM    Collection Time: 01/14/22  6:12 AM   Result Value Ref Range    Magnesium 2.2 1.6 - 2.6 mg/dL   PROTHROMBIN TIME + INR    Collection Time: 01/14/22  6:12 AM   Result Value Ref Range    Prothrombin time 14.5 11.5 - 15.2 sec    INR 1.2 0.8 - 1.2     PTT    Collection Time: 01/14/22  6:12 AM   Result Value Ref Range    aPTT 41.0 (H) 23.0 - 36.4 SEC   FIBRINOGEN    Collection Time: 01/14/22  6:12 AM   Result Value Ref Range    Fibrinogen 393 210 - 451 mg/dL   C REACTIVE PROTEIN, QT    Collection Time: 01/14/22  6:12 AM   Result Value Ref Range    C-Reactive protein 1.2 (H) 0 - 0.3 mg/dL   D DIMER    Collection Time: 01/14/22  6:12 AM   Result Value Ref Range    D DIMER 1.57 (H) <0.46 ug/ml(FEU)   TROPONIN-HIGH SENSITIVITY    Collection Time: 01/14/22  6:13 AM   Result Value Ref Range    Troponin-High Sensitivity 3,695 (HH) 0 - 54 ng/L   LACTIC ACID    Collection Time: 01/14/22  6:13 AM   Result Value Ref Range    Lactic acid 0.9 0.4 - 2.0 MMOL/L   VITAMIN D, 25 HYDROXY    Collection Time: 01/14/22  6:13 AM   Result Value Ref Range    Vitamin D 25-Hydroxy 56.6 30 - 100 ng/mL       Telemetry: normal sinus rhythm      Assessment:     Principal Problem:    Pneumonia due to COVID-19 virus (1/13/2022)    Active Problems:    CHF exacerbation ([facility] (10/23/2021)      Essential hypertension (11/6/2013)      Stage 4 chronic kidney disease ([facility] [facility] (12/12/2013)      Elevated troponin (1/14/2022)      Hypoxia (1/14/2022)        Plan:     Continue current measures as per hospital medicine and critical care. Her troponin is elevated but there is no evidence of acute coronary syndrome. We will continue to monitor.       Mari Alatorre MD

## 2022-01-14 NOTE — PROGRESS NOTES
D/C plan; home vs home w/ HH. Family to transport when medically stable. [address] list provided to bedside nurse to provide to pt. Reason for Admission:  Covid +                     RUR Score:     11%                Plan for utilizing home health:     TBD. PCP: First and Last name:  [place] MD   Cardiologist: Dr. Annia Pavon. Nephrologist: Dr. Charles Jean-Baptiste. Oncologist: Dr. Kris Levy. Name of Practice:     Are you a current patient: Yes/No:    Approximate date of last visit:  Right before hospitalization. Can you participate in a virtual visit with your PCP: yes                    Current Advanced Directive/Advance Care Plan: Full Code      Healthcare Decision Maker:   Click here to complete Motiga including selection of the Healthcare Decision Maker Relationship (ie \"Primary\")         [employer]. Transition of Care Plan: Kings Jordan is a 74 y.o.  female who history of congestive heart failure, hypertension, TIA, chronic kidney disease stage IV baseline creatinine 2.2, asthma or presents to the emergency room with increasing shortness of breath and dyspnea that has been progressive for the last 2 days. Patient started with cough and congestion on Tuesday this is verified with her daughter.   She has had 1 Moderna vaccine back in July and has never completed her second shot or booster     In the emergency room she is given Lasix 40 mg IV with diuresis  CTA done in the emergency room is inconclusive for PE  VQ scan was ordered and was pending at the time of admission  Dopplers of her legs were also ordered  Although patient was not severely hypoxic she was tachypneic and it was thought that she would benefit from admission.     Please call [employer]  [phone] cell number   Anisa sister for emergency [phone]

## 2022-01-14 NOTE — PROGRESS NOTES
Shift summary: the patient rested with no new clinical concerns noted. She has been oriented to the room; call bell is within reach. Bedside and Verbal shift change report given to Felicitas Owusu RN (oncoming nurse) by Darren Siddiqi RN (offgoing nurse). Report included the following information SBAR, Kardex, Intake/Output, MAR, Recent Results, and Cardiac Rhythm: Sinus rhythm/Hugo.

## 2022-01-15 ENCOUNTER — APPOINTMENT (OUTPATIENT)
Dept: ULTRASOUND IMAGING | Age: 67
End: 2022-01-15
Attending: STUDENT IN AN ORGANIZED HEALTH CARE EDUCATION/TRAINING PROGRAM
Payer: MEDICARE

## 2022-01-15 LAB
ALBUMIN SERPL-MCNC: 3.2 G/DL (ref 3.4–5)
ALBUMIN/GLOB SERPL: 0.9 {RATIO} (ref 0.8–1.7)
ALP SERPL-CCNC: 73 U/L (ref 45–117)
ALT SERPL-CCNC: 33 U/L (ref 13–56)
ANION GAP SERPL CALC-SCNC: 7 MMOL/L (ref 3–18)
AST SERPL-CCNC: 28 U/L (ref 10–38)
BASOPHILS # BLD: 0 K/UL (ref 0–0.1)
BASOPHILS NFR BLD: 0 % (ref 0–2)
BILIRUB SERPL-MCNC: 0.4 MG/DL (ref 0.2–1)
BUN SERPL-MCNC: 39 MG/DL (ref 7–18)
BUN/CREAT SERPL: 15 (ref 12–20)
CALCIUM SERPL-MCNC: 8.4 MG/DL (ref 8.5–10.1)
CHLORIDE SERPL-SCNC: 111 MMOL/L (ref 100–111)
CO2 SERPL-SCNC: 27 MMOL/L (ref 21–32)
CREAT SERPL-MCNC: 2.62 MG/DL (ref 0.6–1.3)
CRP SERPL-MCNC: 0.6 MG/DL (ref 0–0.3)
D DIMER PPP FEU-MCNC: 1.21 UG/ML(FEU)
DIFFERENTIAL METHOD BLD: ABNORMAL
EOSINOPHIL # BLD: 0 K/UL (ref 0–0.4)
EOSINOPHIL NFR BLD: 0 % (ref 0–5)
ERYTHROCYTE [DISTWIDTH] IN BLOOD BY AUTOMATED COUNT: 15.7 % (ref 11.6–14.5)
FERRITIN SERPL-MCNC: 103 NG/ML (ref 8–388)
GLOBULIN SER CALC-MCNC: 3.4 G/DL (ref 2–4)
GLUCOSE SERPL-MCNC: 113 MG/DL (ref 74–99)
HCT VFR BLD AUTO: 33.9 % (ref 35–45)
HGB BLD-MCNC: 10 G/DL (ref 12–16)
IMM GRANULOCYTES # BLD AUTO: 0 K/UL (ref 0–0.04)
IMM GRANULOCYTES NFR BLD AUTO: 1 % (ref 0–0.5)
LYMPHOCYTES # BLD: 0.5 K/UL (ref 0.9–3.6)
LYMPHOCYTES NFR BLD: 12 % (ref 21–52)
MAGNESIUM SERPL-MCNC: 2.1 MG/DL (ref 1.6–2.6)
MCH RBC QN AUTO: 27.9 PG (ref 24–34)
MCHC RBC AUTO-ENTMCNC: 29.5 G/DL (ref 31–37)
MCV RBC AUTO: 94.7 FL (ref 78–100)
MONOCYTES # BLD: 0.1 K/UL (ref 0.05–1.2)
MONOCYTES NFR BLD: 3 % (ref 3–10)
NEUTS SEG # BLD: 3.5 K/UL (ref 1.8–8)
NEUTS SEG NFR BLD: 84 % (ref 40–73)
NRBC # BLD: 0 K/UL (ref 0–0.01)
NRBC BLD-RTO: 0 PER 100 WBC
PLATELET # BLD AUTO: 246 K/UL (ref 135–420)
PMV BLD AUTO: 12 FL (ref 9.2–11.8)
POTASSIUM SERPL-SCNC: 4.1 MMOL/L (ref 3.5–5.5)
PROCALCITONIN SERPL-MCNC: 0.1 NG/ML
PROT SERPL-MCNC: 6.6 G/DL (ref 6.4–8.2)
RBC # BLD AUTO: 3.58 M/UL (ref 4.2–5.3)
SODIUM SERPL-SCNC: 145 MMOL/L (ref 136–145)
WBC # BLD AUTO: 4.2 K/UL (ref 4.6–13.2)

## 2022-01-15 PROCEDURE — 74011250636 HC RX REV CODE- 250/636: Performed by: HOSPITALIST

## 2022-01-15 PROCEDURE — 74011250637 HC RX REV CODE- 250/637: Performed by: INTERNAL MEDICINE

## 2022-01-15 PROCEDURE — 36415 COLL VENOUS BLD VENIPUNCTURE: CPT

## 2022-01-15 PROCEDURE — 74011000250 HC RX REV CODE- 250: Performed by: INTERNAL MEDICINE

## 2022-01-15 PROCEDURE — 77010033678 HC OXYGEN DAILY

## 2022-01-15 PROCEDURE — 74011250636 HC RX REV CODE- 250/636: Performed by: INTERNAL MEDICINE

## 2022-01-15 PROCEDURE — 83735 ASSAY OF MAGNESIUM: CPT

## 2022-01-15 PROCEDURE — 99232 SBSQ HOSP IP/OBS MODERATE 35: CPT | Performed by: INTERNAL MEDICINE

## 2022-01-15 PROCEDURE — 85379 FIBRIN DEGRADATION QUANT: CPT

## 2022-01-15 PROCEDURE — 96375 TX/PRO/DX INJ NEW DRUG ADDON: CPT

## 2022-01-15 PROCEDURE — 84145 PROCALCITONIN (PCT): CPT

## 2022-01-15 PROCEDURE — C9113 INJ PANTOPRAZOLE SODIUM, VIA: HCPCS | Performed by: INTERNAL MEDICINE

## 2022-01-15 PROCEDURE — 76770 US EXAM ABDO BACK WALL COMP: CPT

## 2022-01-15 PROCEDURE — G0378 HOSPITAL OBSERVATION PER HR: HCPCS

## 2022-01-15 PROCEDURE — 65660000000 HC RM CCU STEPDOWN

## 2022-01-15 PROCEDURE — 74011250637 HC RX REV CODE- 250/637: Performed by: HOSPITALIST

## 2022-01-15 PROCEDURE — 85025 COMPLETE CBC W/AUTO DIFF WBC: CPT

## 2022-01-15 PROCEDURE — 83529 ASAY OF INTERLEUKIN-6 (IL-6): CPT

## 2022-01-15 PROCEDURE — 80053 COMPREHEN METABOLIC PANEL: CPT

## 2022-01-15 PROCEDURE — 82728 ASSAY OF FERRITIN: CPT

## 2022-01-15 PROCEDURE — 86140 C-REACTIVE PROTEIN: CPT

## 2022-01-15 RX ORDER — ENOXAPARIN SODIUM 150 MG/ML
1 INJECTION SUBCUTANEOUS EVERY 24 HOURS
Status: DISCONTINUED | OUTPATIENT
Start: 2022-01-16 | End: 2022-01-16 | Stop reason: HOSPADM

## 2022-01-15 RX ORDER — BUMETANIDE 0.25 MG/ML
1 INJECTION INTRAMUSCULAR; INTRAVENOUS DAILY
Status: DISCONTINUED | OUTPATIENT
Start: 2022-01-16 | End: 2022-01-16 | Stop reason: HOSPADM

## 2022-01-15 RX ADMIN — CLOPIDOGREL BISULFATE 75 MG: 75 TABLET ORAL at 09:46

## 2022-01-15 RX ADMIN — HYDRALAZINE HYDROCHLORIDE 25 MG: 25 TABLET, FILM COATED ORAL at 22:13

## 2022-01-15 RX ADMIN — ATORVASTATIN CALCIUM 40 MG: 20 TABLET, FILM COATED ORAL at 22:13

## 2022-01-15 RX ADMIN — ALBUTEROL SULFATE 1 PUFF: 108 INHALANT RESPIRATORY (INHALATION) at 00:00

## 2022-01-15 RX ADMIN — DEXAMETHASONE SODIUM PHOSPHATE 6 MG: 10 INJECTION, SOLUTION INTRAMUSCULAR; INTRAVENOUS at 11:39

## 2022-01-15 RX ADMIN — SODIUM CHLORIDE, PRESERVATIVE FREE 10 ML: 5 INJECTION INTRAVENOUS at 14:00

## 2022-01-15 RX ADMIN — ALBUTEROL SULFATE 1 PUFF: 108 INHALANT RESPIRATORY (INHALATION) at 12:00

## 2022-01-15 RX ADMIN — ZINC SULFATE 220 MG (50 MG) CAPSULE 1 CAPSULE: CAPSULE at 09:46

## 2022-01-15 RX ADMIN — BUDESONIDE AND FORMOTEROL FUMARATE DIHYDRATE 2 PUFF: 80; 4.5 AEROSOL RESPIRATORY (INHALATION) at 09:47

## 2022-01-15 RX ADMIN — SODIUM CHLORIDE, PRESERVATIVE FREE 10 ML: 5 INJECTION INTRAVENOUS at 07:40

## 2022-01-15 RX ADMIN — BUMETANIDE 1 MG: 0.25 INJECTION, SOLUTION INTRAMUSCULAR; INTRAVENOUS at 09:46

## 2022-01-15 RX ADMIN — SODIUM CHLORIDE, PRESERVATIVE FREE 40 MG: 5 INJECTION INTRAVENOUS at 22:15

## 2022-01-15 RX ADMIN — Medication 10 MG: at 22:13

## 2022-01-15 RX ADMIN — HYDRALAZINE HYDROCHLORIDE 25 MG: 25 TABLET, FILM COATED ORAL at 09:47

## 2022-01-15 RX ADMIN — CARVEDILOL 6.25 MG: 3.12 TABLET, FILM COATED ORAL at 09:46

## 2022-01-15 RX ADMIN — BUDESONIDE AND FORMOTEROL FUMARATE DIHYDRATE 2 PUFF: 80; 4.5 AEROSOL RESPIRATORY (INHALATION) at 22:12

## 2022-01-15 RX ADMIN — ISOSORBIDE DINITRATE 30 MG: 20 TABLET ORAL at 09:46

## 2022-01-15 RX ADMIN — CARVEDILOL 6.25 MG: 3.12 TABLET, FILM COATED ORAL at 22:13

## 2022-01-15 RX ADMIN — ALBUTEROL SULFATE 1 PUFF: 108 INHALANT RESPIRATORY (INHALATION) at 22:12

## 2022-01-15 RX ADMIN — ALBUTEROL SULFATE 1 PUFF: 108 INHALANT RESPIRATORY (INHALATION) at 09:47

## 2022-01-15 RX ADMIN — Medication 250 MG: at 09:46

## 2022-01-15 RX ADMIN — ENOXAPARIN SODIUM 140 MG: 150 INJECTION SUBCUTANEOUS at 11:40

## 2022-01-15 RX ADMIN — ISOSORBIDE DINITRATE 30 MG: 20 TABLET ORAL at 22:13

## 2022-01-15 RX ADMIN — HYDRALAZINE HYDROCHLORIDE 25 MG: 25 TABLET, FILM COATED ORAL at 18:32

## 2022-01-15 RX ADMIN — ALBUTEROL SULFATE 1 PUFF: 108 INHALANT RESPIRATORY (INHALATION) at 18:35

## 2022-01-15 NOTE — CONSULTS
RENAL CONSULT  1/15/2022    Patient: Angie Jain  :  1955  Gender:  female  MRN #:  [id number]    Reason for Consult:  chronic renal failure and volume overload     Assessment: Angie Jain is a 74y.o. year old female with h/o CKD stage G4 , CHF , Hypertension , TIA, asthma or presents to the emergency room with worsening  shortness of breath  She was found to have COVID-19 infection . Her baseline creatinine seems to be around 2.2 mg/dl based on previous last month test. On presentation creatinine was 1.74 mg/dl which could be due to volume overload as well     Creatinine this morning was 2.62 mg/dl based on latest serum creatinine she has mild LEDY on CKD which is multifactorial - hemodynamics related vs due to COVID 19 which sometimes cause tubulointerstitial nephritis . Does not have evidence of TMA         Plan:      # LEDY on CKD- Clinically volume status improving . She is breathing comfortably will decrease bumex to 1 mg iv daily   - Adequate water intake for hydration   - USG retroperitoneum , urine electrolytes and UPC  - Ensure that she does not retain urine   - avoid NSAIDS, contrast and nephrotoxin   - No clinical and metabolic indication of dialysis   - dose all meds for current eGFR         History of Present Illness:    Angie Jain is a 74y.o. year old female with h/o CKD stage G4 , CHF , Hypertension , TIA, asthma or presents to the emergency room with worsening  shortness of breath for 2 days prior to admission. She was found to have covid infection   Clinically was in volume overload with high BNPA, was given lasix and admitted in floor    When I saw her this morning she was on nasal canula oxygen. Says her breathing is fine , Denied problems in urination . Past Medical History:   Diagnosis Date    CHF (congestive heart failure) ([facility] [address] [facility]     Hypertension      History reviewed. No pertinent surgical history. History reviewed. No pertinent family history.   No Known Allergies  Current Facility-Administered Medications   Medication Dose Route Frequency Provider Last Rate Last Admin    carvediloL (COREG) tablet 6.25 mg  6.25 mg Oral BID Leora Lane MD   6.25 mg at 01/15/22 0946    albuterol (PROVENTIL HFA, VENTOLIN HFA, PROAIR HFA) inhaler 1 Puff  1 Puff Inhalation Q4H RT Jeanine Lazaro MD   1 Puff at 01/15/22 0947    enoxaparin (LOVENOX) injection 140 mg  1.5 mg/kg SubCUTAneous Q24H Jeanine Lazaro MD   140 mg at 01/14/22 1223    dexamethasone (PF) (DECADRON) 10 mg/mL injection 6 mg  6 mg IntraVENous Q24H Leora Lane MD   6 mg at 01/14/22 1223    guaiFENesin-dextromethorphan (ROBITUSSIN DM) 100-10 mg/5 mL syrup 5 mL  5 mL Oral Q4H PRN Leora Lane MD        ascorbic acid (vitamin C) (VITAMIN C) tablet 250 mg  250 mg Oral DAILY Leora Lane MD   250 mg at 01/15/22 0946    zinc sulfate (ZINCATE) 50 mg zinc (220 mg) capsule 1 Capsule  1 Capsule Oral DAILY Leora Lane MD   1 Capsule at 01/15/22 0946    melatonin (rapid dissolve) tablet 10 mg  10 mg Oral QHS Leora Lane MD   10 mg at 01/14/22 2219    bumetanide (BUMEX) injection 1 mg  1 mg IntraVENous Q12H Leora Lane MD   1 mg at 01/15/22 0946    pantoprazole (PROTONIX) 40 mg in 0.9% sodium chloride 10 mL injection  40 mg IntraVENous Q12H Leora Lane MD   40 mg at 01/14/22 0847    sodium chloride (NS) flush 5-40 mL  5-40 mL IntraVENous Q8H Leora Lane MD   10 mL at 01/15/22 0740    sodium chloride (NS) flush 5-40 mL  5-40 mL IntraVENous PRN Leora Lane MD        acetaminophen (TYLENOL) tablet 650 mg  650 mg Oral Q6H PRN Leora Lane MD        Or    acetaminophen (TYLENOL) suppository 650 mg  650 mg Rectal Q6H PRN Leora Lane MD        polyethylene glycol (MIRALAX) packet 17 g  17 g Oral DAILY PRN Leora Lane MD        ondansetron (ZOFRAN ODT) tablet 4 mg  4 mg Oral Q8H PRN Leora Lane MD        Or  ondansetron (ZOFRAN) injection 4 mg  4 mg IntraVENous Q6H PRN Lolita Lane MD        clopidogreL (PLAVIX) tablet 75 mg  75 mg Oral DAILY Lolita Lane MD   75 mg at 01/15/22 0946    atorvastatin (LIPITOR) tablet 40 mg  40 mg Oral QHS Lolita Lane MD   40 mg at 01/14/22 2219    isosorbide dinitrate (ISORDIL) tablet 30 mg  30 mg Oral BID Lolita Lane MD   30 mg at 01/15/22 0946    hydrALAZINE (APRESOLINE) tablet 25 mg  25 mg Oral TID Lolita Lane MD   25 mg at 01/15/22 0947    hydrALAZINE (APRESOLINE) 20 mg/mL injection 10 mg  10 mg IntraVENous Q6H PRN Lolita Lane MD        budesonide-formoterol (SYMBICORT) 80-4.5 mcg inhaler  2 Puff Inhalation BID RT Lolita Lane MD   2 Puff at 01/15/22 0947       [unfilled]    Review of Symptoms:     Consitutional Symptoms: No fever, weight loss, weight gain and fatigue  Eyes:- No change in vision , no itching   Ears, Nose, Mouth, Throat:  No neck pain , swelling ,hearing loss and nose bleed. Pulmonary: has cough and shortness of breath . CVS: No  Chest pain , palpitation and orthopnea  GI: No nausea, vomiting, abdominal pain, and blood in stool   - No burning, frequency ,urgency  and difficulty voiding . Neurological:No dizzy ness, syncope, focal weakness and numbness . Skin : No rash and erythema   Endocrine: No feeling of excessive cold or warmth, hot flushes  Psychiatric: Denied feeling depressed    Objective:    Visit Vitals  /67 (BP 1 Location: Left upper arm)   Pulse 80   Temp 98 °F (36.7 °C)   Resp 20   Ht 5' 6\" (1.676 m)   Wt 97.5 kg (215 lb)   SpO2 100%   BMI 34.70 kg/m²       Physical Exam:    Pt awake,  alert and comfortable  HEENT: No JVD, anicteric sclera, no neck swelling  Lung: clear to auscultation, no crackles and wheeze  Heart: s1s2 regualr no rubs or murmur  Abdomen: soft, non tender, no guarding, normal bowel sounds.   Ext: trace edema     CNS- Oriented to time , place and person     Laboratory Data:    Lab Results   Component Value Date    BUN 39 (H) 01/15/2022    BUN 32 (H) 01/14/2022    BUN 27 (H) 01/13/2022     01/15/2022     (H) 01/14/2022     (H) 01/13/2022    CO2 27 01/15/2022    CO2 27 01/14/2022    CO2 25 01/13/2022     Lab Results   Component Value Date    WBC 4.2 (L) 01/15/2022    HGB 10.0 (L) 01/15/2022    HCT 33.9 (L) 01/15/2022     No components found for: CALCIUM, PHOSPHORUS, MAGNESIUM  No results found for: HDL  No results found for: SPECIMENTYP, TURBIDITY, UGLU    Imaging Reveiwed:    CTA CHEST on 1/13/22- IMPRESSION     1.  Mildly limited study due to motion obscuring peripheral vessels but no  definite evidence of pulmonary embolism.     2. Mild areas of bilateral lower lobe and partial right upper lobe atelectasis. Very mild peripheral nonspecific groundglass opacities bilateral upper lobes  which can be related to small airway disease, edema or atypical infection.     3. Cardiomegaly with reflux of contrast into IVC suggesting right heart failure.     4.  Incidental bilateral nonobstructing renal calculi      Jakub Bell MD   [facility], [facility].- Nephrology

## 2022-01-15 NOTE — PROGRESS NOTES
Problem: Airway Clearance - Ineffective  Goal: Achieve or maintain patent airway  Outcome: Progressing Towards Goal     Problem: Gas Exchange - Impaired  Goal: Absence of hypoxia  Outcome: Progressing Towards Goal  Goal: Promote optimal lung function  Outcome: Progressing Towards Goal     Problem: Breathing Pattern - Ineffective  Goal: Ability to achieve and maintain a regular respiratory rate  Outcome: Progressing Towards Goal     Problem: Body Temperature -  Risk of, Imbalanced  Goal: Ability to maintain a body temperature within defined limits  Outcome: Progressing Towards Goal  Goal: Will regain or maintain usual level of consciousness  Outcome: Progressing Towards Goal  Goal: Complications related to the disease process, condition or treatment will be avoided or minimized  Outcome: Progressing Towards Goal     Problem: Isolation Precautions - Risk of Spread of Infection  Goal: Prevent transmission of infectious organism to others  Outcome: Progressing Towards Goal     Problem: Nutrition Deficits  Goal: Optimize nutrtional status  Outcome: Progressing Towards Goal     Problem: Risk for Fluid Volume Deficit  Goal: Maintain normal heart rhythm  Outcome: Progressing Towards Goal  Goal: Maintain absence of muscle cramping  Outcome: Progressing Towards Goal  Goal: Maintain normal serum potassium, sodium, calcium, phosphorus, and pH  Outcome: Progressing Towards Goal     Problem: Loneliness or Risk for Loneliness  Goal: Demonstrate positive use of time alone when socialization is not possible  Outcome: Progressing Towards Goal     Problem: Fatigue  Goal: Verbalize increase energy and improved vitality  Outcome: Progressing Towards Goal     Problem: Patient Education: Go to Patient Education Activity  Goal: Patient/Family Education  Outcome: Progressing Towards Goal     Problem: Falls - Risk of  Goal: *Absence of Falls  Description: Document Vidhi Fall Risk and appropriate interventions in the flowsheet.   Outcome: Progressing Towards Goal  Note: Fall Risk Interventions:  Mobility Interventions: Patient to call before getting OOB              Elimination Interventions: Call light in reach

## 2022-01-15 NOTE — PROGRESS NOTES
CrCl = 25.2 ml/min  Therefore treatment dose must be reduced to 1mg/kg sc   Daily. Renal Function Dosing    Patient has been ordered lovenox 1.5 mg/kg sc q 24 for treatment of thrombosis. Due to declining CrCl the dose must be reduced to 1 mg/kg sc q 24 hrs. Laurie Whitley Lovenox will go from 140 mg sq q 24 hrs to 100 mg sc q 24 hrs.     Pharmacy will continue to monitor and make adjustments as needed    Thank Judah Aguilar  [address]

## 2022-01-15 NOTE — PROGRESS NOTES
Cardiology Progress Note      1/15/2022 3:28 PM    Admit Date: 1/13/2022    Admit Diagnosis: COVID [U07.1]      Subjective: Nery Khan denies chest pain, chest pressure/discomfort, dyspnea, palpitations.     Visit Vitals  BP (!) 141/75   Pulse 79   Temp 97.1 °F (36.2 °C)   Resp 20   Ht 5' 6\" (1.676 m)   Wt 97.5 kg (215 lb)   SpO2 96%   BMI 34.70 kg/m²     Current Facility-Administered Medications   Medication Dose Route Frequency    [START ON 1/16/2022] bumetanide (BUMEX) injection 1 mg  1 mg IntraVENous DAILY    [START ON 1/16/2022] enoxaparin (LOVENOX) injection 100 mg  1 mg/kg SubCUTAneous Q24H    carvediloL (COREG) tablet 6.25 mg  6.25 mg Oral BID    albuterol (PROVENTIL HFA, VENTOLIN HFA, PROAIR HFA) inhaler 1 Puff  1 Puff Inhalation Q4H RT    dexamethasone (PF) (DECADRON) 10 mg/mL injection 6 mg  6 mg IntraVENous Q24H    guaiFENesin-dextromethorphan (ROBITUSSIN DM) 100-10 mg/5 mL syrup 5 mL  5 mL Oral Q4H PRN    ascorbic acid (vitamin C) (VITAMIN C) tablet 250 mg  250 mg Oral DAILY    zinc sulfate (ZINCATE) 50 mg zinc (220 mg) capsule 1 Capsule  1 Capsule Oral DAILY    melatonin (rapid dissolve) tablet 10 mg  10 mg Oral QHS    pantoprazole (PROTONIX) 40 mg in 0.9% sodium chloride 10 mL injection  40 mg IntraVENous Q12H    sodium chloride (NS) flush 5-40 mL  5-40 mL IntraVENous Q8H    sodium chloride (NS) flush 5-40 mL  5-40 mL IntraVENous PRN    acetaminophen (TYLENOL) tablet 650 mg  650 mg Oral Q6H PRN    Or    acetaminophen (TYLENOL) suppository 650 mg  650 mg Rectal Q6H PRN    polyethylene glycol (MIRALAX) packet 17 g  17 g Oral DAILY PRN    ondansetron (ZOFRAN ODT) tablet 4 mg  4 mg Oral Q8H PRN    Or    ondansetron (ZOFRAN) injection 4 mg  4 mg IntraVENous Q6H PRN    clopidogreL (PLAVIX) tablet 75 mg  75 mg Oral DAILY    atorvastatin (LIPITOR) tablet 40 mg  40 mg Oral QHS    isosorbide dinitrate (ISORDIL) tablet 30 mg  30 mg Oral BID    hydrALAZINE (APRESOLINE) tablet 25 mg  25 mg Oral TID    hydrALAZINE (APRESOLINE) 20 mg/mL injection 10 mg  10 mg IntraVENous Q6H PRN    budesonide-formoterol (SYMBICORT) 80-4.5 mcg inhaler  2 Puff Inhalation BID RT         Objective:      Physical Exam:  Constitutional: Non toxic appearing, moderate distress  Head: Normocephalic, Atraumatic  Eyes: EOMI  Neck: Supple  Cardiovascular: Regular rate and rhythm, no murmurs, rubs, or gallops  Chest: Normal work of breathing and chest excursion bilaterally  Lungs: Tachypnea, no wheezing, minimally prolonged expiratory phase with diminished lung sounds in the right lung base  Abdomen: Soft, non tender, non distended, normoactive bowel sounds  Back: No evidence of trauma or deformity  Extremities: No evidence of trauma or deformity, nonpitting edema to bilateral lower extremities  Skin: Warm and dry, normal cap refill  Neuro: Alert and appropriate, CN intact, normal speech, strength and sensation full and symmetric bilaterally, normal coordination  Psychiatric: Normal mood and affect    Data Review:   Labs:    Recent Results (from the past 24 hour(s))   ECHO ADULT COMPLETE    Collection Time: 01/14/22  6:50 PM   Result Value Ref Range    IVSd 1.5 (A) 0.6 - 0.9 cm    LVIDd 6.0 (A) 3.9 - 5.3 cm    LVIDs 4.2 cm    LVOT Diameter 2.6 cm    LVPWd 1.7 (A) 0.6 - 0.9 cm    LVOT SV 82.8 ml    Global Longitudinal Strain -7.3 %    EF BP 40 (A) 55 - 100 %    LV Ejection Fraction A2C 43 %    LV Ejection Fraction A4C 41 %    LV EDV A2C 145 mL    LV EDV A4C 94 mL    LV EDV  (A) 56 - 104 mL    LV ESV A2C 83 mL    LV ESV A4C 55 mL    LV ESV BP 74 (A) 19 - 49 mL    LVOT Peak Gradient 2 mmHg    LVOT Mean Gradient 1 mmHg    LVOT Peak Velocity 0.7 m/s    LVOT VTI 15.6 cm    RVIDd 3.2 cm    TAPSE 1.7 1.5 - 2.0 cm    RV Free Wall Peak S' 10 cm/s    LA Diameter 5.2 cm    LA Volume A/L 120 mL    LA Volume 2C 125 (A) 22 - 52 mL    LA Volume 4C 85 (A) 22 - 52 mL    LA Volume  (A) 22 - 52 mL    AV Area by Peak Velocity 3.0 cm2    AV Area by Peak Velocity 3.0 cm2    AV Area by VTI 3.8 cm2    AV Area by VTI 3.8 cm2    AV Peak Gradient 7 mmHg    AV Mean Gradient 3 mmHg    AV Peak Velocity 1.3 m/s    AV Mean Velocity 0.8 m/s    AV VTI 22.3 cm    MV A Velocity 0.61 m/s    MV E Wave Deceleration Time 109.3 ms    MV E Velocity 1.02 m/s    LV E' Lateral Velocity 4 cm/s    LV E' Septal Velocity 7 cm/s    Pulmonary Artery EDP 4 mmHg    AL Max Velocity 1.0 m/s    Aortic Root 3.2 cm    Fractional Shortening 2D 30 28 - 44 %    LV ESV Index BP 36 mL/m2    LV ESV Index A4C 27 mL/m2    LV EDV Index A4C 46 mL/m2    LV ESV Index A2C 40 mL/m2    LV EDV Index A2C 70 mL/m2    LVIDd Index 2.91 cm/m2    LVIDs Index 2.04 cm/m2    LV RWT Ratio 0.57     LV Mass 2D 468.8 (A) 67 - 162 g    LV Mass 2D Index 227.6 (A) 43 - 95 g/m2    MV E/A 1.67     E/E' Ratio (Averaged) 20.04     E/E' Lateral 25.50     E/E' Septal 14.57     LA Volume Index A/L 58 16 - 34 mL/m2    LVOT Stroke Volume Index 40.2 mL/m2    LVOT Area 5.3 cm2    LA Volume Index 2C 61 (A) 16 - 34 mL/m2    LA Volume Index 4C 41 (A) 16 - 34 mL/m2    LA Size Index 2.52 cm/m2    LA/AO Root Ratio 1.63     Ao Root Index 1.55 cm/m2    AV Velocity Ratio 0.54     LVOT:AV VTI Index 0.70     LV EDV Index BP 60 mL/m2    LA Volume Index BP 54 (A) 16 - 34 ml/m2   CBC WITH AUTOMATED DIFF    Collection Time: 01/15/22  5:00 AM   Result Value Ref Range    WBC 4.2 (L) 4.6 - 13.2 K/uL    RBC 3.58 (L) 4.20 - 5.30 M/uL    HGB 10.0 (L) 12.0 - 16.0 g/dL    HCT 33.9 (L) 35.0 - 45.0 %    MCV 94.7 78.0 - 100.0 FL    MCH 27.9 24.0 - 34.0 PG    MCHC 29.5 (L) 31.0 - 37.0 g/dL    RDW 15.7 (H) 11.6 - 14.5 %    PLATELET 405 971 - 263 K/uL    MPV 12.0 (H) 9.2 - 11.8 FL    NRBC 0.0 0  WBC    ABSOLUTE NRBC 0.00 0.00 - 0.01 K/uL    NEUTROPHILS 84 (H) 40 - 73 %    LYMPHOCYTES 12 (L) 21 - 52 %    MONOCYTES 3 3 - 10 %    EOSINOPHILS 0 0 - 5 %    BASOPHILS 0 0 - 2 %    IMMATURE GRANULOCYTES 1 (H) 0.0 - 0.5 %    ABS.  NEUTROPHILS 3.5 1.8 - 8.0 K/UL ABS. LYMPHOCYTES 0.5 (L) 0.9 - 3.6 K/UL    ABS. MONOCYTES 0.1 0.05 - 1.2 K/UL    ABS. EOSINOPHILS 0.0 0.0 - 0.4 K/UL    ABS. BASOPHILS 0.0 0.0 - 0.1 K/UL    ABS. IMM. GRANS. 0.0 0.00 - 0.04 K/UL    DF AUTOMATED     METABOLIC PANEL, COMPREHENSIVE    Collection Time: 01/15/22  5:00 AM   Result Value Ref Range    Sodium 145 136 - 145 mmol/L    Potassium 4.1 3.5 - 5.5 mmol/L    Chloride 111 100 - 111 mmol/L    CO2 27 21 - 32 mmol/L    Anion gap 7 3.0 - 18 mmol/L    Glucose 113 (H) 74 - 99 mg/dL    BUN 39 (H) 7.0 - 18 MG/DL    Creatinine 2.62 (H) 0.6 - 1.3 MG/DL    BUN/Creatinine ratio 15 12 - 20      GFR est AA 22 (L) >60 ml/min/1.73m2    GFR est non-AA 18 (L) >60 ml/min/1.73m2    Calcium 8.4 (L) 8.5 - 10.1 MG/DL    Bilirubin, total 0.4 0.2 - 1.0 MG/DL    ALT (SGPT) 33 13 - 56 U/L    AST (SGOT) 28 10 - 38 U/L    Alk. phosphatase 73 45 - 117 U/L    Protein, total 6.6 6.4 - 8.2 g/dL    Albumin 3.2 (L) 3.4 - 5.0 g/dL    Globulin 3.4 2.0 - 4.0 g/dL    A-G Ratio 0.9 0.8 - 1.7     MAGNESIUM    Collection Time: 01/15/22  5:00 AM   Result Value Ref Range    Magnesium 2.1 1.6 - 2.6 mg/dL   D DIMER    Collection Time: 01/15/22  5:00 AM   Result Value Ref Range    D DIMER 1.21 (H) <0.46 ug/ml(FEU)   C REACTIVE PROTEIN, QT    Collection Time: 01/15/22  5:00 AM   Result Value Ref Range    C-Reactive protein 0.6 (H) 0 - 0.3 mg/dL   FERRITIN    Collection Time: 01/15/22  5:00 AM   Result Value Ref Range    Ferritin 103 8 - 388 NG/ML   PROCALCITONIN    Collection Time: 01/15/22  5:00 AM   Result Value Ref Range    Procalcitonin 0.10 ng/mL       Telemetry: Sinus      Assessment:     Principal Problem:    Pneumonia due to COVID-19 virus (1/13/2022)    Active Problems:    CHF exacerbation ([facility] (10/23/2021)      Essential hypertension (11/6/2013)      Stage 4 chronic kidney disease ([facility] (12/12/2013)      Elevated troponin (1/14/2022)      Hypoxia (1/14/2022)        Plan:     Continue current measures.   Currently she is essentially asymptomatic. Elevated troponin with no evidence of ACS. Continue to monitor. Continue supportive measures.       Dania Tee MD

## 2022-01-16 VITALS
BODY MASS INDEX: 34.62 KG/M2 | RESPIRATION RATE: 20 BRPM | HEART RATE: 75 BPM | SYSTOLIC BLOOD PRESSURE: 101 MMHG | WEIGHT: 215.39 LBS | TEMPERATURE: 98 F | HEIGHT: 66 IN | OXYGEN SATURATION: 99 % | DIASTOLIC BLOOD PRESSURE: 59 MMHG

## 2022-01-16 LAB
ALBUMIN SERPL-MCNC: 2.9 G/DL (ref 3.4–5)
ALBUMIN/GLOB SERPL: 0.8 {RATIO} (ref 0.8–1.7)
ALP SERPL-CCNC: 63 U/L (ref 45–117)
ALT SERPL-CCNC: 27 U/L (ref 13–56)
ANION GAP SERPL CALC-SCNC: 6 MMOL/L (ref 3–18)
AST SERPL-CCNC: 22 U/L (ref 10–38)
BASOPHILS # BLD: 0 K/UL (ref 0–0.1)
BASOPHILS NFR BLD: 0 % (ref 0–2)
BILIRUB SERPL-MCNC: 0.5 MG/DL (ref 0.2–1)
BUN SERPL-MCNC: 41 MG/DL (ref 7–18)
BUN/CREAT SERPL: 18 (ref 12–20)
CALCIUM SERPL-MCNC: 8.5 MG/DL (ref 8.5–10.1)
CHLORIDE SERPL-SCNC: 109 MMOL/L (ref 100–111)
CO2 SERPL-SCNC: 28 MMOL/L (ref 21–32)
CREAT SERPL-MCNC: 2.24 MG/DL (ref 0.6–1.3)
DIFFERENTIAL METHOD BLD: ABNORMAL
EOSINOPHIL # BLD: 0 K/UL (ref 0–0.4)
EOSINOPHIL NFR BLD: 0 % (ref 0–5)
ERYTHROCYTE [DISTWIDTH] IN BLOOD BY AUTOMATED COUNT: 15.5 % (ref 11.6–14.5)
GLOBULIN SER CALC-MCNC: 3.5 G/DL (ref 2–4)
GLUCOSE SERPL-MCNC: 96 MG/DL (ref 74–99)
HCT VFR BLD AUTO: 32.4 % (ref 35–45)
HGB BLD-MCNC: 9.9 G/DL (ref 12–16)
IMM GRANULOCYTES # BLD AUTO: 0 K/UL (ref 0–0.04)
IMM GRANULOCYTES NFR BLD AUTO: 1 % (ref 0–0.5)
LYMPHOCYTES # BLD: 0.8 K/UL (ref 0.9–3.6)
LYMPHOCYTES NFR BLD: 16 % (ref 21–52)
MAGNESIUM SERPL-MCNC: 2.1 MG/DL (ref 1.6–2.6)
MCH RBC QN AUTO: 29.2 PG (ref 24–34)
MCHC RBC AUTO-ENTMCNC: 30.6 G/DL (ref 31–37)
MCV RBC AUTO: 95.6 FL (ref 78–100)
MONOCYTES # BLD: 0.3 K/UL (ref 0.05–1.2)
MONOCYTES NFR BLD: 6 % (ref 3–10)
NEUTS SEG # BLD: 3.8 K/UL (ref 1.8–8)
NEUTS SEG NFR BLD: 77 % (ref 40–73)
NRBC # BLD: 0 K/UL (ref 0–0.01)
NRBC BLD-RTO: 0 PER 100 WBC
PLATELET # BLD AUTO: 224 K/UL (ref 135–420)
PMV BLD AUTO: 11.6 FL (ref 9.2–11.8)
POTASSIUM SERPL-SCNC: 3.8 MMOL/L (ref 3.5–5.5)
PROT SERPL-MCNC: 6.4 G/DL (ref 6.4–8.2)
RBC # BLD AUTO: 3.39 M/UL (ref 4.2–5.3)
SODIUM SERPL-SCNC: 143 MMOL/L (ref 136–145)
WBC # BLD AUTO: 4.9 K/UL (ref 4.6–13.2)

## 2022-01-16 PROCEDURE — 74011000250 HC RX REV CODE- 250: Performed by: INTERNAL MEDICINE

## 2022-01-16 PROCEDURE — 96372 THER/PROPH/DIAG INJ SC/IM: CPT

## 2022-01-16 PROCEDURE — 74011250637 HC RX REV CODE- 250/637: Performed by: HOSPITALIST

## 2022-01-16 PROCEDURE — C9113 INJ PANTOPRAZOLE SODIUM, VIA: HCPCS | Performed by: INTERNAL MEDICINE

## 2022-01-16 PROCEDURE — 80053 COMPREHEN METABOLIC PANEL: CPT

## 2022-01-16 PROCEDURE — 96376 TX/PRO/DX INJ SAME DRUG ADON: CPT

## 2022-01-16 PROCEDURE — 36415 COLL VENOUS BLD VENIPUNCTURE: CPT

## 2022-01-16 PROCEDURE — 74011250637 HC RX REV CODE- 250/637: Performed by: INTERNAL MEDICINE

## 2022-01-16 PROCEDURE — 74011000250 HC RX REV CODE- 250: Performed by: STUDENT IN AN ORGANIZED HEALTH CARE EDUCATION/TRAINING PROGRAM

## 2022-01-16 PROCEDURE — 83735 ASSAY OF MAGNESIUM: CPT

## 2022-01-16 PROCEDURE — G0378 HOSPITAL OBSERVATION PER HR: HCPCS

## 2022-01-16 PROCEDURE — 74011250636 HC RX REV CODE- 250/636: Performed by: HOSPITALIST

## 2022-01-16 PROCEDURE — 85025 COMPLETE CBC W/AUTO DIFF WBC: CPT

## 2022-01-16 PROCEDURE — 77010033678 HC OXYGEN DAILY

## 2022-01-16 PROCEDURE — 74011250636 HC RX REV CODE- 250/636: Performed by: INTERNAL MEDICINE

## 2022-01-16 PROCEDURE — 99232 SBSQ HOSP IP/OBS MODERATE 35: CPT | Performed by: INTERNAL MEDICINE

## 2022-01-16 RX ORDER — PANTOPRAZOLE SODIUM 40 MG/1
40 TABLET, DELAYED RELEASE ORAL EVERY 12 HOURS
Status: DISCONTINUED | OUTPATIENT
Start: 2022-01-16 | End: 2022-01-16 | Stop reason: HOSPADM

## 2022-01-16 RX ORDER — DEXAMETHASONE 4 MG/1
6 TABLET ORAL DAILY
Status: DISCONTINUED | OUTPATIENT
Start: 2022-01-17 | End: 2022-01-16 | Stop reason: HOSPADM

## 2022-01-16 RX ORDER — DEXAMETHASONE 6 MG/1
6 TABLET ORAL
Qty: 6 TABLET | Refills: 0 | Status: SHIPPED | OUTPATIENT
Start: 2022-01-16 | End: 2022-01-22

## 2022-01-16 RX ADMIN — BUDESONIDE AND FORMOTEROL FUMARATE DIHYDRATE 2 PUFF: 80; 4.5 AEROSOL RESPIRATORY (INHALATION) at 07:00

## 2022-01-16 RX ADMIN — ALBUTEROL SULFATE 1 PUFF: 108 INHALANT RESPIRATORY (INHALATION) at 06:59

## 2022-01-16 RX ADMIN — HYDRALAZINE HYDROCHLORIDE 25 MG: 25 TABLET, FILM COATED ORAL at 09:30

## 2022-01-16 RX ADMIN — ALBUTEROL SULFATE 1 PUFF: 108 INHALANT RESPIRATORY (INHALATION) at 15:08

## 2022-01-16 RX ADMIN — SODIUM CHLORIDE, PRESERVATIVE FREE 10 ML: 5 INJECTION INTRAVENOUS at 15:08

## 2022-01-16 RX ADMIN — ISOSORBIDE DINITRATE 30 MG: 20 TABLET ORAL at 09:30

## 2022-01-16 RX ADMIN — ALBUTEROL SULFATE 1 PUFF: 108 INHALANT RESPIRATORY (INHALATION) at 07:01

## 2022-01-16 RX ADMIN — CARVEDILOL 6.25 MG: 3.12 TABLET, FILM COATED ORAL at 09:29

## 2022-01-16 RX ADMIN — Medication 250 MG: at 09:31

## 2022-01-16 RX ADMIN — ZINC SULFATE 220 MG (50 MG) CAPSULE 1 CAPSULE: CAPSULE at 09:30

## 2022-01-16 RX ADMIN — SODIUM CHLORIDE, PRESERVATIVE FREE 40 MG: 5 INJECTION INTRAVENOUS at 09:32

## 2022-01-16 RX ADMIN — ALBUTEROL SULFATE 1 PUFF: 108 INHALANT RESPIRATORY (INHALATION) at 00:00

## 2022-01-16 RX ADMIN — DEXAMETHASONE SODIUM PHOSPHATE 6 MG: 10 INJECTION, SOLUTION INTRAMUSCULAR; INTRAVENOUS at 12:00

## 2022-01-16 RX ADMIN — ALBUTEROL SULFATE 1 PUFF: 108 INHALANT RESPIRATORY (INHALATION) at 12:00

## 2022-01-16 RX ADMIN — HYDRALAZINE HYDROCHLORIDE 25 MG: 25 TABLET, FILM COATED ORAL at 15:08

## 2022-01-16 RX ADMIN — SODIUM CHLORIDE, PRESERVATIVE FREE 10 ML: 5 INJECTION INTRAVENOUS at 07:00

## 2022-01-16 RX ADMIN — CLOPIDOGREL BISULFATE 75 MG: 75 TABLET ORAL at 09:30

## 2022-01-16 RX ADMIN — ENOXAPARIN SODIUM 100 MG: 150 INJECTION SUBCUTANEOUS at 12:00

## 2022-01-16 RX ADMIN — BUMETANIDE 1 MG: 0.25 INJECTION, SOLUTION INTRAMUSCULAR; INTRAVENOUS at 09:31

## 2022-01-16 NOTE — PROGRESS NOTES
Cardiology Progress Note      1/16/2022 1:35 PM    Admit Date: 1/13/2022    Admit Diagnosis: COVID [U07.1]      Subjective: Татьяна Bruce denies chest pain, chest pressure/discomfort, dyspnea, palpitations, irregular heart beats, near-syncope, syncope.     Visit Vitals  BP (!) 101/59   Pulse 75   Temp 97.8 °F (36.6 °C)   Resp 20   Ht 5' 6\" (1.676 m)   Wt 97.7 kg (215 lb 6.2 oz)   SpO2 99%   BMI 34.76 kg/m²     Current Facility-Administered Medications   Medication Dose Route Frequency    bumetanide (BUMEX) injection 1 mg  1 mg IntraVENous DAILY    enoxaparin (LOVENOX) injection 100 mg  1 mg/kg SubCUTAneous Q24H    carvediloL (COREG) tablet 6.25 mg  6.25 mg Oral BID    albuterol (PROVENTIL HFA, VENTOLIN HFA, PROAIR HFA) inhaler 1 Puff  1 Puff Inhalation Q4H RT    dexamethasone (PF) (DECADRON) 10 mg/mL injection 6 mg  6 mg IntraVENous Q24H    guaiFENesin-dextromethorphan (ROBITUSSIN DM) 100-10 mg/5 mL syrup 5 mL  5 mL Oral Q4H PRN    ascorbic acid (vitamin C) (VITAMIN C) tablet 250 mg  250 mg Oral DAILY    zinc sulfate (ZINCATE) 50 mg zinc (220 mg) capsule 1 Capsule  1 Capsule Oral DAILY    melatonin (rapid dissolve) tablet 10 mg  10 mg Oral QHS    pantoprazole (PROTONIX) 40 mg in 0.9% sodium chloride 10 mL injection  40 mg IntraVENous Q12H    sodium chloride (NS) flush 5-40 mL  5-40 mL IntraVENous Q8H    sodium chloride (NS) flush 5-40 mL  5-40 mL IntraVENous PRN    acetaminophen (TYLENOL) tablet 650 mg  650 mg Oral Q6H PRN    Or    acetaminophen (TYLENOL) suppository 650 mg  650 mg Rectal Q6H PRN    polyethylene glycol (MIRALAX) packet 17 g  17 g Oral DAILY PRN    ondansetron (ZOFRAN ODT) tablet 4 mg  4 mg Oral Q8H PRN    Or    ondansetron (ZOFRAN) injection 4 mg  4 mg IntraVENous Q6H PRN    clopidogreL (PLAVIX) tablet 75 mg  75 mg Oral DAILY    atorvastatin (LIPITOR) tablet 40 mg  40 mg Oral QHS    isosorbide dinitrate (ISORDIL) tablet 30 mg  30 mg Oral BID    hydrALAZINE (APRESOLINE) tablet 25 mg  25 mg Oral TID    hydrALAZINE (APRESOLINE) 20 mg/mL injection 10 mg  10 mg IntraVENous Q6H PRN    budesonide-formoterol (SYMBICORT) 80-4.5 mcg inhaler  2 Puff Inhalation BID RT         Objective:      Physical Exam:  Visit Vitals  BP (!) 101/59   Pulse 75   Temp 97.8 °F (36.6 °C)   Resp 20   Ht 5' 6\" (1.676 m)   Wt 97.7 kg (215 lb 6.2 oz)   SpO2 99%   BMI 34.76 kg/m²     General Appearance:  Well developed, well nourished,alert and oriented x 3, and individual in no acute distress. Ears/Nose/Mouth/Throat:   Hearing grossly normal.         Neck: Supple. Chest:   Lungs clear to auscultation bilaterally. Cardiovascular:  Regular rate and rhythm, S1, S2 normal, no murmur. Abdomen:   Soft, non-tender, bowel sounds are active. Extremities: No edema bilaterally. Skin: Warm and dry. Data Review:   Labs:    Recent Results (from the past 24 hour(s))   CBC WITH AUTOMATED DIFF    Collection Time: 01/16/22  5:20 AM   Result Value Ref Range    WBC 4.9 4.6 - 13.2 K/uL    RBC 3.39 (L) 4.20 - 5.30 M/uL    HGB 9.9 (L) 12.0 - 16.0 g/dL    HCT 32.4 (L) 35.0 - 45.0 %    MCV 95.6 78.0 - 100.0 FL    MCH 29.2 24.0 - 34.0 PG    MCHC 30.6 (L) 31.0 - 37.0 g/dL    RDW 15.5 (H) 11.6 - 14.5 %    PLATELET 829 396 - 829 K/uL    MPV 11.6 9.2 - 11.8 FL    NRBC 0.0 0  WBC    ABSOLUTE NRBC 0.00 0.00 - 0.01 K/uL    NEUTROPHILS 77 (H) 40 - 73 %    LYMPHOCYTES 16 (L) 21 - 52 %    MONOCYTES 6 3 - 10 %    EOSINOPHILS 0 0 - 5 %    BASOPHILS 0 0 - 2 %    IMMATURE GRANULOCYTES 1 (H) 0.0 - 0.5 %    ABS. NEUTROPHILS 3.8 1.8 - 8.0 K/UL    ABS. LYMPHOCYTES 0.8 (L) 0.9 - 3.6 K/UL    ABS. MONOCYTES 0.3 0.05 - 1.2 K/UL    ABS. EOSINOPHILS 0.0 0.0 - 0.4 K/UL    ABS. BASOPHILS 0.0 0.0 - 0.1 K/UL    ABS. IMM.  GRANS. 0.0 0.00 - 0.04 K/UL    DF AUTOMATED     METABOLIC PANEL, COMPREHENSIVE    Collection Time: 01/16/22  5:20 AM   Result Value Ref Range    Sodium 143 136 - 145 mmol/L    Potassium 3.8 3.5 - 5.5 mmol/L    Chloride 109 100 - 111 mmol/L    CO2 28 21 - 32 mmol/L    Anion gap 6 3.0 - 18 mmol/L    Glucose 96 74 - 99 mg/dL    BUN 41 (H) 7.0 - 18 MG/DL    Creatinine 2.24 (H) 0.6 - 1.3 MG/DL    BUN/Creatinine ratio 18 12 - 20      GFR est AA 27 (L) >60 ml/min/1.73m2    GFR est non-AA 22 (L) >60 ml/min/1.73m2    Calcium 8.5 8.5 - 10.1 MG/DL    Bilirubin, total 0.5 0.2 - 1.0 MG/DL    ALT (SGPT) 27 13 - 56 U/L    AST (SGOT) 22 10 - 38 U/L    Alk. phosphatase 63 45 - 117 U/L    Protein, total 6.4 6.4 - 8.2 g/dL    Albumin 2.9 (L) 3.4 - 5.0 g/dL    Globulin 3.5 2.0 - 4.0 g/dL    A-G Ratio 0.8 0.8 - 1.7     MAGNESIUM    Collection Time: 01/16/22  5:20 AM   Result Value Ref Range    Magnesium 2.1 1.6 - 2.6 mg/dL       Telemetry: normal sinus rhythm      Assessment:     Principal Problem:    Pneumonia due to COVID-19 virus (1/13/2022)    Active Problems:    CHF exacerbation ([facility] (10/23/2021)      Essential hypertension (11/6/2013)      Stage 4 chronic kidney disease ([facility] (12/12/2013)      Elevated troponin (1/14/2022)      Hypoxia (1/14/2022)        Plan:     The patient reports she is doing quite well. She is not short of breath at rest.  She is having no chest pain. Continue current regimen. Cardiology will follow.       Satya Mari MD

## 2022-01-16 NOTE — PROGRESS NOTES
RENAL CONSULT PROGRESS NOTE   2022    Patient: Laurie Whitley  :  1955  Gender:  female  MRN #:  [id number]    Reason for Consult:  chronic renal failure and volume overload     Assessment: Laurie Whitley is a 74y.o. year old female with h/o CKD stage G4 , CHF , Hypertension , TIA, asthma or presents to the emergency room with worsening  shortness of breath  She was found to have COVID-19 infection . Her baseline creatinine seems to be around 2.2 mg/dl based on previous last month test. On presentation creatinine was 1.74 mg/dl which could be due to volume overload as well     Creatinine on 1/15/22  was 2.62 mg/dl based on latest serum creatinine she has mild LEDY on CKD which is multifactorial - hemodynamics related vs due to COVID 19 which sometimes cause tubulointerstitial nephritis . Does not have evidence of TMA     SUBJECTIVE:  she says she fees lot better  Denied shortness of breath and chets pain   Bp stable  Urinating fine S    Plan:      # LEDY on CKD- Urine output is not recorded. she reports urinating few times , breathing comfortably in room air    Clinically volume status improving and renal function stable. Continue  bumex to 1 mg iv daily   - Adequate water intake for hydration   - USG retroperitoneum- no hydronephrosis .    - Ensure that she does not retain urine   - avoid NSAIDS, contrast and nephrotoxin   - No clinical and metabolic indication of dialysis   - dose all meds for current eGFR       Intake/Output Summary (Last 24 hours) at 2022 1315  Last data filed at 1/15/2022 2111  Gross per 24 hour   Intake    Output 700 ml   Net -700 ml         Objective:    Visit Vitals  BP (!) 101/59   Pulse 75   Temp 97.8 °F (36.6 °C)   Resp 20   Ht 5' 6\" (1.676 m)   Wt 97.7 kg (215 lb 6.2 oz)   SpO2 99%   BMI 34.76 kg/m²       Physical Exam:    Pt awake  HEENT:no neck swelling  Lung: clear to auscultation  Ext: trace edema     CNS- Oriented to time , place and person     Laboratory Data:    Lab Results   Component Value Date    BUN 41 (H) 01/16/2022    BUN 39 (H) 01/15/2022    BUN 32 (H) 01/14/2022     01/16/2022     01/15/2022     (H) 01/14/2022    CO2 28 01/16/2022    CO2 27 01/15/2022    CO2 27 01/14/2022     Lab Results   Component Value Date    WBC 4.9 01/16/2022    HGB 9.9 (L) 01/16/2022    HCT 32.4 (L) 01/16/2022     No components found for: CALCIUM, PHOSPHORUS, MAGNESIUM  No results found for: HDL  No results found for: SPECIMENTYP, TURBIDITY, UGLU    Imaging Reveiwed:    CTA CHEST on 1/13/22- IMPRESSION     1.  Mildly limited study due to motion obscuring peripheral vessels but no  definite evidence of pulmonary embolism.     2. Mild areas of bilateral lower lobe and partial right upper lobe atelectasis. Very mild peripheral nonspecific groundglass opacities bilateral upper lobes  which can be related to small airway disease, edema or atypical infection.     3. Cardiomegaly with reflux of contrast into IVC suggesting right heart failure.     4.  Incidental bilateral nonobstructing renal calculi      Elizabeth Delatorre MD   [facility], [facility].- Nephrology

## 2022-01-16 NOTE — PROGRESS NOTES
Clinical Pharmacy Note: IV to PO Automatic Conversion    Patient Name: Penny Gonzalez   YOB: 1955   Medical Record Number: [id number]   Date of Admission: 1/13/2022    Daily Progress Note: 1/16/2022 2:34 PM     Please note the following medication(s) (protonix and dexamethasone) has/have been changed from IV to PO based on the following critiera:    Patient is taking scheduled oral medications  Patient is tolerating tube feeds at goal rate or an NPO (except for meds), full liquid, soft or regular diet      Current Diet Orders  Active Orders   Diet    ADULT DIET Regular; Low Fat/Low Chol/High Fiber/GABRIELA; 2000 ml        New Order:  protonix 40 mg po q 12 hr  Dexamethasone 6 mg po daily for 6 days to finish a 9 day therapy. This IV to PO conversion is based on the [facility] P&T approved automatic conversion policy for eligible patients. Please call with questions.     Shawna Harris [facility]  Clinical Pharmacist  [phone]

## 2022-01-16 NOTE — PROGRESS NOTES
Problem: Airway Clearance - Ineffective  Goal: Achieve or maintain patent airway  Outcome: Progressing Towards Goal     Problem: Gas Exchange - Impaired  Goal: Absence of hypoxia  Outcome: Progressing Towards Goal  Goal: Promote optimal lung function  Outcome: Progressing Towards Goal     Problem: Breathing Pattern - Ineffective  Goal: Ability to achieve and maintain a regular respiratory rate  Outcome: Progressing Towards Goal     Problem: Body Temperature -  Risk of, Imbalanced  Goal: Ability to maintain a body temperature within defined limits  Outcome: Progressing Towards Goal  Goal: Will regain or maintain usual level of consciousness  Outcome: Progressing Towards Goal  Goal: Complications related to the disease process, condition or treatment will be avoided or minimized  Outcome: Progressing Towards Goal     Problem: Isolation Precautions - Risk of Spread of Infection  Goal: Prevent transmission of infectious organism to others  Outcome: Progressing Towards Goal     Problem: Nutrition Deficits  Goal: Optimize nutrtional status  Outcome: Progressing Towards Goal     Problem: Risk for Fluid Volume Deficit  Goal: Maintain normal heart rhythm  Outcome: Progressing Towards Goal  Goal: Maintain absence of muscle cramping  Outcome: Progressing Towards Goal  Goal: Maintain normal serum potassium, sodium, calcium, phosphorus, and pH  Outcome: Progressing Towards Goal     Problem: Loneliness or Risk for Loneliness  Goal: Demonstrate positive use of time alone when socialization is not possible  Outcome: Progressing Towards Goal     Problem: Fatigue  Goal: Verbalize increase energy and improved vitality  Outcome: Progressing Towards Goal     Problem: Patient Education: Go to Patient Education Activity  Goal: Patient/Family Education  Outcome: Progressing Towards Goal     Problem: Falls - Risk of  Goal: *Absence of Falls  Description: Document Vidhi Fall Risk and appropriate interventions in the flowsheet.   Outcome: Progressing Towards Goal  Note: Fall Risk Interventions:  Mobility Interventions: Patient to call before getting OOB         Medication Interventions: Teach patient to arise slowly    Elimination Interventions: Call light in reach              Problem: Patient Education: Go to Patient Education Activity  Goal: Patient/Family Education  Outcome: Progressing Towards Goal

## 2022-01-16 NOTE — PROGRESS NOTES
[facility]  (A Division of [address] [place] [address])                                                                                                                      Marjan Mckeon MD  Office #: - Option # [phone]  Fax #: [phone]     Date of Admission: 1/13/2022Date of Note: 1/16/2022  Reason for FU: Evaluation and antibiotic management of COVID 19 infection. Current Antimicrobials:    Prior Antimicrobials:  N/A    Immunosuppressive drugs: NA       Assessment- ID related:  --------------------------------------------------------------------------  Patient with incomplete Vaccine hx and no booster with:    · COVID 19 infection  · Mild viral pneumonia with patchy GGO on CT chest  --mild Hypoxia: ABG on 36%( 7.36/39/84)  --CT chest --no PE  --V/Q-not suggestive of PE  --PVL- no DVT in LE  · Dyspneia- COVID Vs CHF( Pro-BNP 7386, elevated troponin)  COVID rapid/ RAYRAY +  Procal-Normal  CRP 1.2-> 1.2  Ferretin-- 97      Other Medical Issues- Mx per respective team:    CKD  Morbidly obese       Recommendation for ID issues I am following:  ------------------------------------------------------------------------------    Doing ok on RA--maybe DC soon per primary    --cont dexamethasone  -remdesivir contra-indicated due to renal failure  --cant offer oral anti-virals-not available as inpatient  --monitor biomarkers/ cbc with diff/cmp dialy  -AC/vitamin coctails/ oxygen support and weaning per primary team    Tociluzimab/ Baricitinab not indicated at this time, will revise if worsening respiratory failure         Subjective:  Feels ok, no complaint  On RA, ambulates without SOB per patient  No F/C/R/abd pain or diarrhea    Notes and labs reviewed. Imaging reports reviewed- WBC 4.9K, biomarkers are not high       HPI:  Coco Carballo is a 74 y.o. BLACK/ with PMH as listed who is partially vaccinated for COVID.  She had some mild URI symtpoms for 2-3 days, but was in acute shortness of breath of 1 day, that seems to be exacerbatd with lying down. Denies F/C//R/ sorethoat/ chest pain. No one at home with COVID infection. Was tested + for COVID. CT/VQ without PE. biomarkers not elevated and she required only upto 2L of oxygen. She feels better already, she is on bumex. Active Hospital Problems    Diagnosis Date Noted    Elevated troponin 01/14/2022    Hypoxia 01/14/2022    Pneumonia due to COVID-19 virus 01/13/2022    CHF exacerbation ([facility] 10/23/2021    Stage 4 chronic kidney disease ([facility] 12/12/2013    Essential hypertension 11/06/2013     Past Medical History:   Diagnosis Date    CHF (congestive heart failure) ([facility]     Hypertension      History reviewed. No pertinent surgical history. History reviewed. No pertinent family history. Social History     Socioeconomic History    Marital status:      Spouse name: Not on file    Number of children: Not on file    Years of education: Not on file    Highest education level: Not on file   Occupational History    Not on file   Tobacco Use    Smoking status: Never Smoker    Smokeless tobacco: Never Used   Substance and Sexual Activity    Alcohol use: Not Currently    Drug use: Never    Sexual activity: Not on file   Other Topics Concern    Not on file   Social History Narrative    Not on file     Social Determinants of Health     Financial Resource Strain:     Difficulty of Paying Living Expenses: Not on file   Food Insecurity:     Worried About Running Out of Food in the Last Year: Not on file    Ceasar of Food in the Last Year: Not on file   Transportation Needs:     Lack of Transportation (Medical): Not on file    Lack of Transportation (Non-Medical):  Not on file   Physical Activity:     Days of Exercise per Week: Not on file    Minutes of Exercise per Session: Not on file   Stress:     Feeling of Stress : Not on file   Social Connections:     Frequency of Communication with Friends and Family: Not on file    Frequency of Social Gatherings with Friends and Family: Not on file    Attends Mandaeism Services: Not on file    Active Member of Clubs or Organizations: Not on file    Attends Club or Organization Meetings: Not on file    Marital Status: Not on file   Intimate Partner Violence:     Fear of Current or Ex-Partner: Not on file    Emotionally Abused: Not on file    Physically Abused: Not on file    Sexually Abused: Not on file   Housing Stability:     Unable to Pay for Housing in the Last Year: Not on file    Number of [place] in the Last Year: Not on file    Unstable Housing in the Last Year: Not on file       Allergies:  Patient has no known allergies.      Medications:  Current Facility-Administered Medications   Medication Dose Route Frequency    bumetanide (BUMEX) injection 1 mg  1 mg IntraVENous DAILY    enoxaparin (LOVENOX) injection 100 mg  1 mg/kg SubCUTAneous Q24H    carvediloL (COREG) tablet 6.25 mg  6.25 mg Oral BID    albuterol (PROVENTIL HFA, VENTOLIN HFA, PROAIR HFA) inhaler 1 Puff  1 Puff Inhalation Q4H RT    dexamethasone (PF) (DECADRON) 10 mg/mL injection 6 mg  6 mg IntraVENous Q24H    guaiFENesin-dextromethorphan (ROBITUSSIN DM) 100-10 mg/5 mL syrup 5 mL  5 mL Oral Q4H PRN    ascorbic acid (vitamin C) (VITAMIN C) tablet 250 mg  250 mg Oral DAILY    zinc sulfate (ZINCATE) 50 mg zinc (220 mg) capsule 1 Capsule  1 Capsule Oral DAILY    melatonin (rapid dissolve) tablet 10 mg  10 mg Oral QHS    pantoprazole (PROTONIX) 40 mg in 0.9% sodium chloride 10 mL injection  40 mg IntraVENous Q12H    sodium chloride (NS) flush 5-40 mL  5-40 mL IntraVENous Q8H    sodium chloride (NS) flush 5-40 mL  5-40 mL IntraVENous PRN    acetaminophen (TYLENOL) tablet 650 mg  650 mg Oral Q6H PRN    Or    acetaminophen (TYLENOL) suppository 650 mg  650 mg Rectal Q6H PRN    polyethylene glycol (MIRALAX) packet 17 g  17 g Oral DAILY PRN    ondansetron (ZOFRAN ODT) tablet 4 mg  4 mg Oral Q8H PRN    Or    ondansetron (ZOFRAN) injection 4 mg  4 mg IntraVENous Q6H PRN    clopidogreL (PLAVIX) tablet 75 mg  75 mg Oral DAILY    atorvastatin (LIPITOR) tablet 40 mg  40 mg Oral QHS    isosorbide dinitrate (ISORDIL) tablet 30 mg  30 mg Oral BID    hydrALAZINE (APRESOLINE) tablet 25 mg  25 mg Oral TID    hydrALAZINE (APRESOLINE) 20 mg/mL injection 10 mg  10 mg IntraVENous Q6H PRN    budesonide-formoterol (SYMBICORT) 80-4.5 mcg inhaler  2 Puff Inhalation BID RT        ROS:  Pertinent items are noted in the History of Present Illness. Physical Exam:    Temp (24hrs), Av.7 °F (36.5 °C), Min:97.5 °F (36.4 °C), Max:98 °F (36.7 °C)    Visit Vitals  BP (!) 101/59   Pulse 75   Temp 97.8 °F (36.6 °C)   Resp 20   Ht 5' 6\" (1.676 m)   Wt 97.7 kg (215 lb 6.2 oz)   SpO2 99%   BMI 34.76 kg/m²          GEN: WDWN, not in resp distress. - on RA  Sitting up in bed    HEENT: Unicteric. EOMI intact  CHEST: Non laboured breathing. CTA  CVS:RRR, no mur/gallop  ABD: Obese/soft. Non tender. HOLLIS: Deferred  EXT: No apparent swelling or redness on UE/LE joints. Skin: Dry and intact. No rash, no redness. CNS: A, OX3. Moves all extremity. CN grossly ok. Microbiology  All Micro Results     Procedure Component Value Units Date/Time    SARS-COV-2 BY RAYRAY [[id number]]  (Abnormal) Collected: 22 0725    Order Status: Completed Specimen: Nasopharyngeal Updated: 22 111     SARS-CoV-2, RAYRAY Detected        Comment: CALLED TO AND CORRECTLY REPEATED BY:   KENISHA AT THE [facility] 2022 0940 TO DARIAN CEDENO This test has been authorized by the FDA under an Emergency Use Authorization (EUA) for use by authorized laboratories. Testing performed by nucleic acid amplification method. CALLED TO AND CORRECTLY REPEATED BY:  GERARDO GILLIAM RN 3N TO  [address] AT 1118 ON 22.          [facility], URINE [[id number]] Collected: 22 1348    Order Status: Completed Specimen: Urine, random Updated: 01/13/22 1840     Strep pneumo Ag, urine Negative       LEGIONELLA PNEUMOPHILA AG, URINE [[id number]] Collected: 01/13/22 1347    Order Status: Completed Specimen: Urine, random Updated: 01/13/22 1839     Legionella Ag, urine Negative       CULTURE, RESPIRATORY/SPUTUM/BRONCH Elihue Diego STAIN [[id number]] Collected: 01/13/22 1200    Order Status: Canceled Specimen: Sputum     COVID-19 RAPID TEST [[id number]]  (Abnormal) Collected: 01/13/22 0725    Order Status: Completed Specimen: Nasopharyngeal Updated: 01/13/22 0824     Specimen source Nasopharyngeal        COVID-19 rapid test Detected        Comment:      The specimen is POSITIVE for SARS-CoV-2, the novel coronavirus associated with COVID-19. This test has been authorized by the FDA under an Emergency Use Authorization (EUA) for use by authorized laboratories. Fact sheet for Healthcare Providers: kstattoo.com  Fact sheet for Patients: kstattoo.com       Methodology: Isothermal Nucleic Acid Amplification  CALLED TO AND CORRECTLY REPEATED BY:  SHANNON WEATHERS ED TO [facility] AT 2534 ON 1/13/22.                 Lab results:    Chemistry  Recent Labs     01/16/22  0520 01/15/22  0500 01/14/22  0612   GLU 96 113* 98    145 146*   K 3.8 4.1 4.1    111 111   CO2 28 27 27   BUN 41* 39* 32*   CREA 2.24* 2.62* 2.12*   CA 8.5 8.4* 8.4*   AGAP 6 7 8   BUCR 18 15 15   AP 63 73 76   TP 6.4 6.6 6.5   ALB 2.9* 3.2* 3.0*   GLOB 3.5 3.4 3.5   AGRAT 0.8 0.9 0.9       CBC w/ Diff  Recent Labs     01/16/22  0520 01/15/22  0500 01/14/22  0612   WBC 4.9 4.2* 4.6   RBC 3.39* 3.58* 3.72*   HGB 9.9* 10.0* 10.6*   HCT 32.4* 33.9* 35.4    246 245   GRANS 77* 84* 77*   LYMPH 16* 12* 17*   EOS 0 0 0       Imaging: report posted below as per radiologist     CXR:     1. Borderline cardiac enlargement with patchy perihilar and basilar areas of  edema/atelectasis

## 2022-01-16 NOTE — PROGRESS NOTES
.Bedside and Verbal shift change report given to Thomas Mejia (oncoming nurse) by Robin Hernandez RN (offgoing nurse). Report included the following information SBAR, Kardex and MAR.

## 2022-01-16 NOTE — DISCHARGE SUMMARY
Discharge Summary    Patient: Jose Daniel Fagan MRN: [id number]  CSN: [id number]    YOB: 1955  Age: 74 y.o. Sex: female    DOA: 1/13/2022 LOS:  LOS: 3 days   Discharge Date:      Primary Care Provider:  Samira Medina MD    Admission Diagnoses: COVID [U07.1]    Discharge Diagnoses:    Problem List as of 1/16/2022 Date Reviewed: 1/13/2022          Codes Class Noted - Resolved    Elevated troponin ICD-10-CM: R77.8  ICD-9-CM: 790.6  1/14/2022 - Present        Hypoxia ICD-10-CM: R09.02  ICD-9-CM: 799.02  1/14/2022 - Present        * (Principal) Pneumonia due to COVID-19 virus ICD-10-CM: U07.1, J12.82  ICD-9-CM: 480.8, 079.89  1/13/2022 - Present        CHF exacerbation ([facility] ICD-10-CM: I50.9  ICD-9-CM: 428.0  10/23/2021 - Present        Moderate malnutrition ([facility] ICD-10-CM: E44.0  ICD-9-CM: 263.0  1/6/2021 - Present        Mild intermittent asthma without complication [id number]: Y33.81  ICD-9-CM: 493.90  8/15/2019 - Present        Acute systolic CHF (congestive heart failure) ([facility] ICD-10-CM: I50.21  ICD-9-CM: 428.21, 428.0  8/14/2019 - Present        LEDY (acute kidney injury) ([facility] ICD-10-CM: N17.9  ICD-9-CM: 584.9  6/6/2018 - Present        Malignant neoplasm of female breast [facility]) ICD-10-CM: C50.919  ICD-9-CM: 174.9  11/24/2014 - Present    Overview Signed 1/13/2022 11:54 AM by Dimitry Hardy MD     Formatting of this note might be different from the original.  Formatting of this note might be different from the original.  right  Formatting of this note might be different from the original.  Formatting of this note might be different from the original.  Converted unresolved ICD9, potential mismatch.   Formatting of this note might be different from the original.  right             S/P bilateral mastectomy ICD-10-CM: Z90.13  ICD-9-CM: V45.71  5/2/2014 - Present        Stage 4 chronic kidney disease ([facility] ICD-10-CM: N18.4  ICD-9-CM: 585.4  12/12/2013 - Present        Atherosclerosis of coronary artery ICD-10-CM: I25.10  ICD-9-CM: 414.00  11/6/2013 - Present        Essential hypertension ICD-10-CM: I10  ICD-9-CM: 401.9  11/6/2013 - Present        RESOLVED: Acute respiratory failure with hypoxia [facility]) ICD-10-CM: J96.01  ICD-9-CM: 518.81  6/5/2018 - 1/14/2022              Discharge Medications:     Current Discharge Medication List      START taking these medications    Details   dexAMETHasone (DECADRON) 6 mg tablet Take 1 Tablet by mouth Daily (before breakfast) for 6 days. Indications: adjunct therapy for COVID-19 requiring oxygen or ventilatory support  Qty: 6 Tablet, Refills: 0  Start date: 1/16/2022, End date: 1/22/2022         CONTINUE these medications which have NOT CHANGED    Details   carvediloL (COREG) 6.25 mg tablet Take 6.25 mg by mouth two (2) times a day. I have called checked with the patient, she takes Hyzaar daily, magnesium oxide, hydralazine, Bumex, Coreg, amlodipine at home. She reports that she does not take Plavix and this was taken off by her cardiologist. Per reports she was taking Plavix and statin for history of TIA however she denies taking these medications. Recommend follow-up with PCP and reconcile medications appropriately. Her medication reconciliation was not done at the time of admission. Discharge Condition: Good    Procedures : none    Consults: Cardiology, Infectious Disease and Nephrology      PHYSICAL EXAM   Visit Vitals  BP (!) 101/59   Pulse 75   Temp 98 °F (36.7 °C)   Resp 20   Ht 5' 6\" (1.676 m)   Wt 97.7 kg (215 lb 6.2 oz)   SpO2 99%   BMI 34.76 kg/m²     General: Awake, cooperative, no acute distress    HEENT: NC, Atraumatic. PERRLA, EOMI. Anicteric sclerae. Lungs:  CTA Bilaterally. No Wheezing/Rhonchi/Rales. Heart:  Regular  rhythm,  No murmur, No Rubs, No Gallops  Abdomen: Soft, Non distended, Non tender. +Bowel sounds,   Extremities: No c/c/e  Psych:   Not anxious or agitated. Neurologic:  No acute neurological deficits. Admission HPI :   Jennifer Rutledge is a 74 y.o.  female who history of congestive heart failure, hypertension, TIA, chronic kidney disease stage IV baseline creatinine 2.2, asthma or presents to the emergency room with increasing shortness of breath and dyspnea that has been progressive for the last 2 days. Patient started with cough and congestion on Tuesday this is verified with her daughter. She has had 1 Moderna vaccine back in July and has never completed her second shot or booster     In the emergency room she is given Lasix 40 mg IV with diuresis  CTA done in the emergency room is inconclusive for PE  VQ scan was ordered and was pending at the time of admission  Dopplers of her legs were also ordered  Although patient was not severely hypoxic she was tachypneic and it was thought that she would benefit from admission. Hospital Course :   Ms. Rissa Dotson was admitted to [place] [address] isolation room, she was seen and followed by cardiology, infectious disease, nephrology. She did not had any acute events during hospitalization. Pneumonia due to COVID-19-  She was started on dexamethasone, vitamin/antioxidant cocktail. She is not a candidate for remdesivir due to her renal function. She required oxygen by nasal cannula and this was gradually weaned off to room air. She is now saturating well above 94% on room air. We will discharge her on dexamethasone to complete 10-day course. Chronic systolic CHF-  She was continued on Bumex. Her echo showed EF of 35 to 40%. She had some elevation in troponin however cardiology did not think she has ACS. Continue her home meds. She is on Coreg and isosorbide. Advise follow-up with cardiology. Chronic kidney disease stage III-IV-  She was seen and followed by nephrology. Her creatinine remained in the range of 2.5. For her asthma continued her inhalers.     called checked with the patient, she takes Hyzaar daily, magnesium oxide, hydralazine, Bumex, Coreg, amlodipine at home. She reports that she does not take Plavix and this was taken off by her cardiologist. Per reports she was taking Plavix and statin for history of TIA however she denies taking these medications. Recommend follow-up with PCP and reconcile medications appropriately. Her medication reconciliation was not done at the time of admission. Activity: Activity as tolerated    Diet: Cardiac Diet    Follow-up: PCP, nephrology, cardiology    Disposition: home    Minutes spent on discharge: 45       Labs: Results:       Chemistry Recent Labs     01/16/22  0520 01/15/22  0500 01/14/22  0612   GLU 96 113* 98    145 146*   K 3.8 4.1 4.1    111 111   CO2 28 27 27   BUN 41* 39* 32*   CREA 2.24* 2.62* 2.12*   CA 8.5 8.4* 8.4*   AGAP 6 7 8   BUCR 18 15 15   AP 63 73 76   TP 6.4 6.6 6.5   ALB 2.9* 3.2* 3.0*   GLOB 3.5 3.4 3.5   AGRAT 0.8 0.9 0.9      CBC w/Diff Recent Labs     01/16/22  0520 01/15/22  0500 01/14/22 0612   WBC 4.9 4.2* 4.6   RBC 3.39* 3.58* 3.72*   HGB 9.9* 10.0* 10.6*   HCT 32.4* 33.9* 35.4    246 245   GRANS 77* 84* 77*   LYMPH 16* 12* 17*   EOS 0 0 0      Cardiac Enzymes No results for input(s): CPK, CKND1, BEBA in the last 72 hours. No lab exists for component: CKRMB, TROIP   Coagulation Recent Labs     01/14/22 0612   PTP 14.5   INR 1.2   APTT 41.0*       Lipid Panel No results found for: CHOL, CHOLPOCT, CHOLX, CHLST, CHOLV, 793466, HDL, HDLP, LDL, LDLC, DLDLP, 399476, VLDLC, VLDL, TGLX, TRIGL, TRIGP, TGLPOCT, CHHD, CHHDX   BNP No results for input(s): BNPP in the last 72 hours. Liver Enzymes Recent Labs     01/16/22  0520   TP 6.4   ALB 2.9*   AP 63      Thyroid Studies No results found for: T4, T3U, TSH, TSHEXT         Significant Diagnostic Studies: NM LUNG SCAN PERF    Result Date: 1/13/2022  EXAM: NM LUNG SCAN PERF. CLINICAL INDICATION/HISTORY: r/o pe. -Additional: Clinical concern for pulmonary embolus.  COMPARISON: Correlation is made with the radiographic study performed the same day. TECHNIQUE: 6.2 mCi Tc-99m MAA was injected intravenously and the 8 standard views of the chest were obtained. This perfusion only examination is interpreted using the PISAPED criteria. _______________ FINDINGS: Perfusion images show relatively symmetric radiotracer distribution to both lungs, with no moderate or large sized segmental perfusion defects to suggest the presence of pulmonary embolism. Diminished uptake at the right bases secondary to attenuation from an elevated right hemidiaphragm. _______________     No scintigraphic findings to suggest the presence of acute pulmonary embolism. _______________      CTA CHEST W OR W WO CONT    Result Date: 1/13/2022  EXAM: CTA Chest CLINICAL INDICATION/HISTORY: Shortness of breath, Covid positive. Possible PE. COMPARISON: Plain film chest same day TECHNIQUE: Axial CT imaging from the thoracic inlet through the diaphragm with intravenous contrast utilizing CTA study for pulmonary artery evaluation. Coronal and sagittal MIP reformations were generated at a separate workstation. One or more dose reduction techniques were used on this CT: automated exposure control, adjustment of the mAs and/or kVp according to patient's size, and iterative reconstruction techniques. The specific techniques utilized on this CT exam have been documented in the patient's electronic medical record. Digital imaging and communications and medicine (DICOM) format image data are available to nonaffiliated external healthcare facilities or entities on a secure, media free, reciprocally searchable basis with patient authorization for at least a 12 month period after this study. _______________ FINDINGS: EXAM QUALITY: Overall exam quality is adequate. Pulmonary arterial enhancement is adequate. The breath hold is satisfactory. Respiratory motion artifact is present, limiting evaluation of peripheral vessels. PULMONARY ARTERIES: No convincing evidence of pulmonary embolism. MEDIASTINUM: Mild diffuse cardiomegaly with coronary artery calcifications with no pericardial effusion. Mild atherosclerotic changes and ectasia thoracic aorta. LYMPH NODES: Borderline enlarged mediastinal lymph nodes maximally 12 mm AP window short axis dimension. AIRWAY: Unremarkable. LUNGS: Mild partial atelectasis bilateral lower lobes and mild small peripheral areas of groundglass opacity bilateral upper lobes as well as small bandlike atelectasis or scarring right upper lobe. No abnormal mass. PLEURA: No pleural effusion or pneumothorax. UPPER ABDOMEN: Reflux of contrast into the IVC and hepatic veins. Several nonobstructing right renal calculi the largest right lower pole maximally 12 mm. Several small nonobstructing left renal calculi. . OTHER: No acute or aggressive osseous abnormalities identified. _______________     1. Mildly limited study due to motion obscuring peripheral vessels but no definite evidence of pulmonary embolism. 2. Mild areas of bilateral lower lobe and partial right upper lobe atelectasis. Very mild peripheral nonspecific groundglass opacities bilateral upper lobes which can be related to small airway disease, edema or atypical infection. 3. Cardiomegaly with reflux of contrast into IVC suggesting right heart failure. 4. Incidental bilateral nonobstructing renal calculi. US RETROPERITONEUM COMP    Result Date: 1/15/2022  ULTRASOUND RETROPERITONEAL INDICATIONS: Renal failure COMPARISON: None FINDINGS: The right kidney is normal in size, measuring 9.3 cm in length. Nonobstructive 1.2 cm right renal calculus. No hydronephrosis or evidence of an acute obstructive uropathy. Increased right renal cortical echogenicity. No solid or cystic right renal mass. The left kidney is slightly diminutive in size, measuring 7.8 cm in length. Increased left renal cortical echogenicity. No solid or cystic left renal mass, hydronephrosis, or calculi.  Bladder is nondistended, measuring only 28 mL prevoid. No focal bladder abnormality. Visualized inferior vena cava remains patent and normal in caliber. Increased left and right renal cortical echogenicity, suggestive of chronic medical renal disease. Nonobstructive 1.2 cm right renal calculus. No obstructive uropathy or acute sonographic abnormality. XR CHEST PORT    Result Date: 1/13/2022  EXAM: XR CHEST PORT CLINICAL INDICATION/HISTORY: Shortness of breath -Additional: None COMPARISON: None TECHNIQUE: Portable frontal view of the chest _______________ FINDINGS: SUPPORT DEVICES: None. HEART AND MEDIASTINUM: Cardiac size is borderline enlarged for AP technique. Mediastinal contours appear within normal limits. LUNGS AND PLEURAL SPACES: Rotated nature of the projection foreshortening the left hemithorax. Perihilar areas of opacity are demonstrated with more linear configuration densities at right greater than left lung bases. No pneumothorax or pleural effusion. BONY THORAX AND SOFT TISSUES: Unremarkable. _______________     1. Borderline cardiac enlargement with patchy perihilar and basilar areas of edema/atelectasis     ECHO ADULT COMPLETE    Result Date: 1/14/2022    Left Ventricle: Left ventricle is moderately dilated. Moderately increased wall thickness. See diagram for wall motion findings. Moderately reduced left ventricular systolic function with a visually estimated EF of 35 - 40%. Grade II diastolic dysfunction with increased LAP. E/e' ratio = 20.04   Left Atrium: Left atrium is severely dilated.   Mitral Valve: Mildly calcified leaflets. Mild transvalvular regurgitation.   Technical qualifiers: Echo study was technically difficult due to patient's body habitus. DUPLEX LOWER EXT VENOUS BILAT    Result Date: 1/13/2022  · No evidence of deep vein thrombosis in the right lower extremity. · No evidence of deep vein thrombosis in the left lower extremity. No results found for this or any previous visit.        Please note that this dictation was completed with [employer], the [employer] voice recognition software. Quite often unanticipated grammatical, syntax, homophones, and other interpretive errors are inadvertently transcribed by the computer software. Please disregard these errors. Please excuse any errors that have escaped final proofreading.      CC: Claudia Aviles MD

## 2022-01-16 NOTE — PROGRESS NOTES
Hospitalist Progress Note    Patient: Laurie Whitley MRN: [id number]  CSN: [id number]    YOB: 1955  Age: 74 y.o. Sex: female    DOA: 1/13/2022 LOS:  LOS: 2 days                Assessment/Plan     Patient Active Problem List   Diagnosis Code    Pneumonia due to COVID-19 virus U07.1, N12.66    Acute systolic CHF (congestive heart failure) ([facility] I50.21    LED[facility] (acute [facility]) [facility] N17.9    Atherosclerosis of coronary artery I25.10    CHF exacerbation ([facility] I50.9    Essential hypertension I10    Malignant neoplasm of female breast ([facility] C50.919    Mild intermittent asthma without complication S18.09    Moderate malnutrition ([facility]) E44.0    S/P bilateral mastectomy Z90.13    Stage 4 chronic kidney disease ([facility] N18.4    Elevated troponin R77.8    Hypoxia R09.02        Chief complaint :  SOB  74 y.o.  female who history of congestive heart failure, hypertension, TIA, chronic kidney disease stage IV baseline creatinine 2.2, asthma or presents to the emergency room with increasing shortness of breath and dyspnea that has been progressive for the last 2 days. Hypoxia -   Due to chf and covid 19 pna   Continue oxygen at 2 L AM  Due to covid 19   cta chest no PE      Pneumonia due to COVID   Continue  Dexamethasone with breathing tx and  vitamin cocktail  No pe and no dvt from studies on 1/13   She would be a fair candidate for remdesivir however she has a creatinine clearance of 28  ID f/u      Congestive heart failure -   continued on IV Bumex   Echo with EF of 35-40%  Cardiology is consulted     Elevated troponin -  Seen by cardiology, no ACS     Stage III-4  renal disease  Nephrology following     Asthma   On steroids and inhalers     History of TIA  She is continuing her Plavix and statin      Remote history of GI bleed    on Protonix    Disposition : TBD    Review of systems  General: No fevers or chills. Cardiovascular: No chest pain or pressure. No palpitations.    Pulmonary: No shortness of breath. Gastrointestinal: No nausea, vomiting. Physical Exam:  General: Awake, cooperative, no acute distress    HEENT: NC, Atraumatic. PERRLA, anicteric sclerae. Lungs: CTA Bilaterally. No Wheezing/Rhonchi/Rales. Heart:  S1 S2,  No murmur, No Rubs, No Gallops  Abdomen: Soft, Non distended, Non tender.  +Bowel sounds,   Extremities: No c/c/e  Psych:   Not anxious or agitated. Neurologic:  No acute neurological deficit. Vital signs/Intake and Output:  Visit Vitals  /83   Pulse 82   Temp 97.5 °F (36.4 °C)   Resp 20   Ht 5' 6\" (1.676 m)   Wt 97.5 kg (215 lb)   SpO2 100%   BMI 34.70 kg/m²     Current Shift:  No intake/output data recorded. Last three shifts:  01/14 0701 - 01/15 1900  In: -   Out: 300 [Urine:300]            Labs: Results:       Chemistry Recent Labs     01/15/22  0500 01/14/22  0612 01/13/22  0723   * 98 99    146* 147*   K 4.1 4.1 3.8    111 114*   CO2 27 27 25   BUN 39* 32* 27*   CREA 2.62* 2.12* 1.74*   CA 8.4* 8.4* 9.4   AGAP 7 8 8   BUCR 15 15 16   AP 73 76 91   TP 6.6 6.5 7.2   ALB 3.2* 3.0* 3.4   GLOB 3.4 3.5 3.8   AGRAT 0.9 0.9 0.9      CBC w/Diff Recent Labs     01/15/22  0500 01/14/22  0612 01/13/22  0723   WBC 4.2* 4.6 6.0   RBC 3.58* 3.72* 4.11*   HGB 10.0* 10.6* 11.9*   HCT 33.9* 35.4 39.9    245 243   GRANS 84* 77* 76*   LYMPH 12* 17* 17*   EOS 0 0 1      Cardiac Enzymes No results for input(s): CPK, CKND1, BEBA in the last 72 hours. No lab exists for component: CKRMB, TROIP   Coagulation Recent Labs     01/14/22  0612   PTP 14.5   INR 1.2   APTT 41.0*       Lipid Panel No results found for: CHOL, CHOLPOCT, CHOLX, CHLST, CHOLV, 988452, HDL, HDLP, LDL, LDLC, DLDLP, [id number], VLDLC, VLDL, TGLX, TRIGL, TRIGP, TGLPOCT, CHHD, CHHDX   BNP No results for input(s): BNPP in the last 72 hours.    Liver Enzymes Recent Labs     01/15/22  0500   TP 6.6   ALB 3.2*   AP 73      Thyroid Studies No results found for: T4, T3U, TSH, TSHEXT Procedures/imaging: see electronic medical records for all procedures/Xrays and details which were not copied into this note but were reviewed prior to creation of Plan

## 2022-01-16 NOTE — PROGRESS NOTES
Problem: Airway Clearance - Ineffective  Goal: Achieve or maintain patent airway  1/16/2022 1512 by Shea Gonzales RN  Outcome: Resolved/Met  1/16/2022 1419 by Shea Gonzales RN  Outcome: Progressing Towards Goal     Problem: Gas Exchange - Impaired  Goal: Absence of hypoxia  1/16/2022 1512 by Shea Gonzales RN  Outcome: Resolved/Met  1/16/2022 1419 by Shea Gonzales RN  Outcome: Progressing Towards Goal  Goal: Promote optimal lung function  1/16/2022 1512 by Shea Gonzales RN  Outcome: Resolved/Met  1/16/2022 1419 by Shea Gonzales RN  Outcome: Progressing Towards Goal     Problem: Breathing Pattern - Ineffective  Goal: Ability to achieve and maintain a regular respiratory rate  1/16/2022 1512 by Shea Gonzales RN  Outcome: Resolved/Met  1/16/2022 1419 by Shea Gonzales RN  Outcome: Progressing Towards Goal     Problem:  Body Temperature -  Risk of, Imbalanced  Goal: Ability to maintain a body temperature within defined limits  1/16/2022 1512 by Shea Gonzales RN  Outcome: Resolved/Met  1/16/2022 1419 by Shea Gonzales RN  Outcome: Progressing Towards Goal  Goal: Will regain or maintain usual level of consciousness  1/16/2022 1512 by Shea Gonzales RN  Outcome: Resolved/Met  1/16/2022 1419 by Shea Gonzales RN  Outcome: Progressing Towards Goal  Goal: Complications related to the disease process, condition or treatment will be avoided or minimized  1/16/2022 1512 by Shea Gonzales RN  Outcome: Resolved/Met  1/16/2022 1419 by Shea Gonzales RN  Outcome: Progressing Towards Goal     Problem: Isolation Precautions - Risk of Spread of Infection  Goal: Prevent transmission of infectious organism to others  1/16/2022 1512 by Shea Gonzales RN  Outcome: Resolved/Met  1/16/2022 1419 by Shea Gonzales RN  Outcome: Progressing Towards Goal     Problem: Nutrition Deficits  Goal: Optimize nutrtional status  1/16/2022 1512 by Shea Gonzales RN  Outcome: Resolved/Met  1/16/2022 1419 by Kristin Gardiner RN  Outcome: Progressing Towards Goal     Problem: Risk for Fluid Volume Deficit  Goal: Maintain normal heart rhythm  1/16/2022 1512 by Kristin Gardiner RN  Outcome: Resolved/Met  1/16/2022 1419 by Kristin Gardiner RN  Outcome: Progressing Towards Goal  Goal: Maintain absence of muscle cramping  1/16/2022 1512 by Kristin Gardiner RN  Outcome: Resolved/Met  1/16/2022 1419 by Kristin Gardiner RN  Outcome: Progressing Towards Goal  Goal: Maintain normal serum potassium, sodium, calcium, phosphorus, and pH  1/16/2022 1512 by Kristin Gardiner RN  Outcome: Resolved/Met  1/16/2022 1419 by Kristin Gardiner RN  Outcome: Progressing Towards Goal     Problem: Loneliness or Risk for Loneliness  Goal: Demonstrate positive use of time alone when socialization is not possible  1/16/2022 1512 by Kristin Gardiner RN  Outcome: Resolved/Met  1/16/2022 1419 by Kristin Gardiner RN  Outcome: Progressing Towards Goal     Problem: Fatigue  Goal: Verbalize increase energy and improved vitality  1/16/2022 1512 by Kristin Gardiner RN  Outcome: Resolved/Met  1/16/2022 1419 by Kristin Gardiner RN  Outcome: Progressing Towards Goal     Problem: Patient Education: Go to Patient Education Activity  Goal: Patient/Family Education  1/16/2022 1512 by Kristin Gardiner RN  Outcome: Resolved/Met  1/16/2022 1419 by Kristin Gardiner RN  Outcome: Progressing Towards Goal     Problem: Falls - Risk of  Goal: *Absence of Falls  Description: Document Vernon Fonseca Fall Risk and appropriate interventions in the flowsheet.   1/16/2022 1512 by Kristin Gardiner RN  Outcome: Resolved/Met  Note: Fall Risk Interventions:  Mobility Interventions: Patient to call before getting OOB         Medication Interventions: Teach patient to arise slowly    Elimination Interventions: Call light in reach           1/16/2022 1419 by Kristin Gardiner RN  Outcome: Progressing Towards Goal  Note: Fall Risk Interventions:  Mobility Interventions: Patient to call before getting OOB         Medication Interventions: Teach patient to arise slowly    Elimination Interventions: Call light in reach              Problem: Patient Education: Go to Patient Education Activity  Goal: Patient/Family Education  1/16/2022 1512 by Xiomara Shoemaker RN  Outcome: Resolved/Met  1/16/2022 1419 by Xiomara Shoemaker, RN  Outcome: Progressing Towards Goal

## 2022-01-16 NOTE — DISCHARGE INSTRUCTIONS
Patient Education        Learning About COVID-19 and Flu Symptoms  How can you tell COVID-19 from the flu? COVID-19 and the flu have similar symptoms. The two can be hard to tell apart. The only way to know for sure which illness you have is to be tested. Since the symptoms are so alike, it makes sense to act as if you have COVID-19 until your test results come back. This means staying home and limiting contact with people in your home. You'll need to wash your hands often and disinfect surfaces that you touch. And be sure to wear a mask when you're around other people. This is also good advice if you think you have the flu. COVID-19 and the flu have these symptoms in common:  · Fever or chills  · Cough  · Shortness of breath  · Fatigue (tiredness)  · Sore throat  · Runny or stuffy nose  · Muscle and body aches  · Headache  · Vomiting and diarrhea (more common in children than adults)  COVID-19 has another symptom that also may occur:  · New loss of taste or smell  COVID-19 symptoms may appear from 2 to 14 days after infection. Flu symptoms usually appear 1 to 4 days after infection. Why should you get a flu shot during the COVID-19 pandemic? It's important to get your yearly flu vaccine. Both the flu and COVID-19 can be active at the same time. You can get sick with both infections at once. And having both may make you more sick than getting just one. The flu vaccine won't protect you from COVID-19. But it can help prevent the flu or reduce its symptoms. If fewer people get very ill with the flu, this will help free up medical resources that are needed for COVID-19 patients. Where can you learn more? Go to http://www.ShoutEm.com/  Enter C123 in the search box to learn more about \"Learning About COVID-19 and Flu Symptoms. \"  Current as of: March 26, 2021               Content Version: 13.0  © 2507-8122 Healthwise, Incorporated.    Care instructions adapted under license by [facility] (which disclaims liability or warranty for this information). If you have questions about a medical condition or this instruction, always ask your healthcare professional. [place] [address] any warranty or liability for your use of this information. Patient Education        Learning About How Hospitals and Clinics Keep You Safe From COVID-19  Overview     Many hospitals and clinics now are treating people who are infected with COVID-19. So if you're in the hospital or clinic for any other reason, this may be an unsettling time. It's common to be concerned about becoming infected with the virus. But hospitals and clinics have policies to prevent the spread of infections. For example, doctors and nurses are trained to wash their hands before they treat you. Health care centers have stepped up these policies now. They are taking further steps to protect their patients. As long as ATVIX-42 remains a public health problem, things are going to be different when you go to a health care facility. They may have new rules for your safety. These could include having you wear a cloth face cover, meeting you outside the clinic, and having you sit away from others in the waiting room. What are hospitals and clinics doing to keep you safe? Your care team is very aware of the threat of COVID-19. They are doing everything they can to keep you safe. Hospitals and clinics may have different policies. But in general, you may expect many of these measures:  · You will be screened for COVID-19. When you come to the hospital, you may have your temperature taken. You'll be asked about any symptoms, such as a fever, a cough, or shortness of breath. You may be asked if you've had contact with anyone who's been diagnosed with the virus. And you may be asked if you've traveled to any place that has had an outbreak. · The staff may try to do as much as possible outside the facility.  For example, you may be asked to fill out paperwork online or in your car before you come inside. The person giving you a ride home may be asked to wait outside. These new rules to help protect you may make routine tasks take longer than usual.  · People who have COVID-19 are treated in a separate area. Many hospitals and clinics have staff members who treat only these patients. This helps limit the spread of the infection. · Visitors may be limited. In some cases, no visitors are allowed. In others, only one healthy visitor is allowed. Children may be limited to having only one adult with them. You can connect with family and friends using your phone or computer. If you need something brought from home, such as glasses or a phone , find out where the item can be dropped off. · The hospital or clinic follows guidelines to prevent infection. These include:  ? Washing hands often. ? Disinfecting high-touch surfaces. ? Wearing face masks or other protective equipment. ? Making extra space for social distancing. What can you do to stay safe? We all have a role to play in keeping ourselves safe and preventing the spread of COVID-19. Here are some things you can do while you're receiving care. If you're in a hospital, stay in your room. This will limit your exposure to the virus. It may be boring, but it's the safest place for you. Wash your hands often. Use soap and water, and scrub for 20 seconds. Then rinse and dry them well. Always wash them after you use the bathroom, before you eat, and after you cough, sneeze, or blow your nose. If you can't wash your hands, use hand . Speak up if you have safety concerns. Don't be shy to correct health care workers if they aren't washing their hands properly, wearing face masks, or taking other precautions. These actions are vital to prevent the spread of infection. Try to be understanding.    This is a stressful time for everyone, including your care team. They are doing their best to keep you safe and provide you with good care. Where can you learn more? Go to http://www.gray.com/  Enter A134 in the search box to learn more about \"Learning About How Hospitals and [facility] Safe From COVID-19. \"  Current as of: March 26, 2021               Content Version: 13.0  © 2904-4517 Healthwise, [employer]. Care instructions adapted under license by [employer] (which disclaims liability or warranty for this information). If you have questions about a medical condition or this instruction, always ask your healthcare professional. [place] [address] any warranty or liability for your use of this information.

## 2022-01-17 ENCOUNTER — PATIENT OUTREACH (OUTPATIENT)
Dept: CASE MANAGEMENT | Age: 67
End: 2022-01-17

## 2022-01-17 NOTE — PROGRESS NOTES
Transitions of Care Call  Call within 2 business days of discharge: Yes     Patient: Nicki Ruiz Patient : 1955 MRN: [id number]    Last Discharge [address]       Complaint Diagnosis Description Type Department Provider    22 Shortness of Breath COVID-19 . .. ED to Hosp-Admission (Discharged) (ADMIT) [id number] Lane, Kiara Gutiérrez MD; Liz Leonard. .. Was this an external facility discharge? No Discharge Facility:   [address] [place] [address] Transitions Nurse ( CTN) attempted to contact patient via telephone call. There was no response. A voicemail message was heard with a different individual's name listed. No message was left at this time.       Lou Tamez RN

## 2022-01-18 ENCOUNTER — PATIENT OUTREACH (OUTPATIENT)
Dept: CASE MANAGEMENT | Age: 67
End: 2022-01-18

## 2022-01-18 LAB
IL6 SERPL-MCNC: 2.5 PG/ML (ref 0–13)
IL6 SERPL-MCNC: <2.5 PG/ML (ref 0–13)

## 2022-01-18 NOTE — PROGRESS NOTES
Transitions of Care Call  Call within 2 business days of discharge: Yes     Patient: Ene Mehta Patient : 1955 MRN: [id number]    Last Discharge [address]       Complaint Diagnosis Description Type Department Provider    22 Shortness of Breath COVID-19 . .. ED to Hosp-Admission (Discharged) (ADMIT) [id number] Bartholomew MD; Asher Gardner. .. Was this an external facility discharge? No Discharge Facility:   [facility]       Care Transitions Nurse ( CTN) attempted to contact patient via telephone call. There was no response. A voicemail message that was heard stated the voicemail was for a different person's name. No  Message was left at this time.          Carolina Robbins RN

## 2022-01-25 ENCOUNTER — PATIENT OUTREACH (OUTPATIENT)
Dept: CASE MANAGEMENT | Age: 67
End: 2022-01-25

## 2022-01-25 NOTE — PROGRESS NOTES
Transitions of Care Call  Call within 2 business days of discharge: Yes     Patient: Maria Del Carmen Kumar Patient : 1955 MRN: [id number]    Last Discharge [address]       Complaint Diagnosis Description Type Department Provider    22 Shortness of Breath COVID-19 . .. ED to Hosp-Admission (Discharged) (ADMIT) [id number] Lane, Porter Lundborg, MD; Rut Mulligan. .. Was this an external facility discharge? No Discharge Facility:   [place] [address] Transitions Nurse ( CTN) attempted to contact patient via telephone call. There was a message indicating this was another person's name on the [employer] system. No message was left for patient.

## 2022-02-14 ENCOUNTER — PATIENT OUTREACH (OUTPATIENT)
Dept: CASE MANAGEMENT | Age: 67
End: 2022-02-14

## 2022-02-14 NOTE — PROGRESS NOTES
Care Transitions Nurse ( CTN) has been unable to reach patient for follow up. No PHI noted to be on file.      Episode closed

## 2022-04-12 ENCOUNTER — HOSPITAL ENCOUNTER (OUTPATIENT)
Dept: LAB | Age: 67
Discharge: HOME | End: 2022-04-12
Payer: MEDICARE

## 2022-04-12 LAB
ALBUMIN SERPL-MCNC: 3.5 G/DL (ref 3.4–5)
ANION GAP SERPL CALC-SCNC: 5 MMOL/L (ref 3–18)
BASOPHILS # BLD: 0 K/UL (ref 0–0.1)
BASOPHILS NFR BLD: 1 % (ref 0–2)
BUN SERPL-MCNC: 50 MG/DL (ref 7–18)
BUN/CREAT SERPL: 19 (ref 12–20)
CALCIUM SERPL-MCNC: 8.9 MG/DL (ref 8.5–10.1)
CHLORIDE SERPL-SCNC: 105 MMOL/L (ref 100–111)
CO2 SERPL-SCNC: 32 MMOL/L (ref 21–32)
CREAT SERPL-MCNC: 2.68 MG/DL (ref 0.6–1.3)
DIFFERENTIAL METHOD BLD: ABNORMAL
EOSINOPHIL # BLD: 0.1 K/UL (ref 0–0.4)
EOSINOPHIL NFR BLD: 2 % (ref 0–5)
ERYTHROCYTE [DISTWIDTH] IN BLOOD BY AUTOMATED COUNT: 14.5 % (ref 11.6–14.5)
GLUCOSE SERPL-MCNC: 85 MG/DL (ref 74–99)
HCT VFR BLD AUTO: 39.9 % (ref 35–45)
HGB BLD-MCNC: 12.1 G/DL (ref 12–16)
IMM GRANULOCYTES # BLD AUTO: 0 K/UL (ref 0–0.04)
IMM GRANULOCYTES NFR BLD AUTO: 0 % (ref 0–0.5)
LYMPHOCYTES # BLD: 1 K/UL (ref 0.9–3.6)
LYMPHOCYTES NFR BLD: 24 % (ref 21–52)
MCH RBC QN AUTO: 28 PG (ref 24–34)
MCHC RBC AUTO-ENTMCNC: 30.3 G/DL (ref 31–37)
MCV RBC AUTO: 92.4 FL (ref 78–100)
MONOCYTES # BLD: 0.4 K/UL (ref 0.05–1.2)
MONOCYTES NFR BLD: 11 % (ref 3–10)
NEUTS SEG # BLD: 2.6 K/UL (ref 1.8–8)
NEUTS SEG NFR BLD: 63 % (ref 40–73)
NRBC # BLD: 0 K/UL (ref 0–0.01)
NRBC BLD-RTO: 0 PER 100 WBC
PHOSPHATE SERPL-MCNC: 3.4 MG/DL (ref 2.5–4.9)
PLATELET # BLD AUTO: 213 K/UL (ref 135–420)
PMV BLD AUTO: 11.7 FL (ref 9.2–11.8)
POTASSIUM SERPL-SCNC: 3.4 MMOL/L (ref 3.5–5.5)
RBC # BLD AUTO: 4.32 M/UL (ref 4.2–5.3)
SODIUM SERPL-SCNC: 142 MMOL/L (ref 136–145)
URATE SERPL-MCNC: 9.5 MG/DL (ref 2.6–7.2)
WBC # BLD AUTO: 4.1 K/UL (ref 4.6–13.2)

## 2022-04-12 PROCEDURE — 82306 VITAMIN D 25 HYDROXY: CPT

## 2022-04-12 PROCEDURE — 83970 ASSAY OF PARATHORMONE: CPT

## 2022-04-12 PROCEDURE — 83735 ASSAY OF MAGNESIUM: CPT

## 2022-04-12 PROCEDURE — 85025 COMPLETE CBC W/AUTO DIFF WBC: CPT

## 2022-04-12 PROCEDURE — 36415 COLL VENOUS BLD VENIPUNCTURE: CPT

## 2022-04-12 PROCEDURE — 82652 VIT D 1 25-DIHYDROXY: CPT

## 2022-04-12 PROCEDURE — 80069 RENAL FUNCTION PANEL: CPT

## 2022-04-12 PROCEDURE — 84550 ASSAY OF BLOOD/URIC ACID: CPT

## 2022-04-13 LAB
1,25(OH)2D3 SERPL-MCNC: 45.4 PG/ML (ref 19.9–79.3)
25(OH)D3 SERPL-MCNC: 35.2 NG/ML (ref 30–100)
CALCIUM SERPL-MCNC: 9.1 MG/DL (ref 8.5–10.1)
MAGNESIUM UR-MCNC: 2.1 MG/DL
PTH-INTACT SERPL-MCNC: 241.2 PG/ML (ref 18.4–88)

## 2022-04-14 LAB
FAX TO INFO,FAXT: NORMAL
FAX TO NUMBER,FAXN: NORMAL

## 2022-05-20 ENCOUNTER — HOSPITAL ENCOUNTER (INPATIENT)
Age: 67
LOS: 6 days | Discharge: ANOTHER HOSPITAL | DRG: 280 | End: 2022-05-26
Attending: EMERGENCY MEDICINE | Admitting: HOSPITALIST
Payer: MEDICARE

## 2022-05-20 ENCOUNTER — APPOINTMENT (OUTPATIENT)
Dept: GENERAL RADIOLOGY | Age: 67
DRG: 280 | End: 2022-05-20
Attending: EMERGENCY MEDICINE
Payer: MEDICARE

## 2022-05-20 ENCOUNTER — APPOINTMENT (OUTPATIENT)
Dept: NON INVASIVE DIAGNOSTICS | Age: 67
DRG: 280 | End: 2022-05-20
Attending: HOSPITALIST
Payer: MEDICARE

## 2022-05-20 DIAGNOSIS — I21.4 NSTEMI (NON-ST ELEVATED MYOCARDIAL INFARCTION) (HCC): Primary | ICD-10-CM

## 2022-05-20 PROBLEM — N17.9 AKI (ACUTE KIDNEY INJURY) (HCC): Status: RESOLVED | Noted: 2018-06-06 | Resolved: 2022-05-20

## 2022-05-20 PROBLEM — R77.8 ELEVATED TROPONIN: Status: RESOLVED | Noted: 2022-01-14 | Resolved: 2022-05-20

## 2022-05-20 PROBLEM — R09.02 HYPOXIA: Status: RESOLVED | Noted: 2022-01-14 | Resolved: 2022-05-20

## 2022-05-20 PROBLEM — I50.22 SYSTOLIC CHF, CHRONIC (HCC): Status: ACTIVE | Noted: 2022-05-20

## 2022-05-20 PROBLEM — I50.21 ACUTE SYSTOLIC CHF (CONGESTIVE HEART FAILURE) (HCC): Status: RESOLVED | Noted: 2019-08-14 | Resolved: 2022-05-20

## 2022-05-20 PROBLEM — J12.82 PNEUMONIA DUE TO COVID-19 VIRUS: Status: RESOLVED | Noted: 2022-01-13 | Resolved: 2022-05-20

## 2022-05-20 PROBLEM — U07.1 PNEUMONIA DUE TO COVID-19 VIRUS: Status: RESOLVED | Noted: 2022-01-13 | Resolved: 2022-05-20

## 2022-05-20 PROBLEM — I50.9 CHF EXACERBATION (HCC): Status: RESOLVED | Noted: 2021-10-23 | Resolved: 2022-05-20

## 2022-05-20 LAB
ALBUMIN SERPL-MCNC: 3.3 G/DL (ref 3.4–5)
ALBUMIN/GLOB SERPL: 1 {RATIO} (ref 0.8–1.7)
ALP SERPL-CCNC: 87 U/L (ref 45–117)
ALT SERPL-CCNC: 83 U/L (ref 13–56)
ANION GAP SERPL CALC-SCNC: 8 MMOL/L (ref 3–18)
APPEARANCE UR: CLEAR
APTT PPP: 30.7 SEC (ref 23–36.4)
APTT PPP: 53.4 SEC (ref 23–36.4)
AST SERPL-CCNC: 537 U/L (ref 10–38)
ATRIAL RATE: 81 BPM
ATRIAL RATE: 81 BPM
BACTERIA URNS QL MICRO: ABNORMAL /HPF
BASOPHILS # BLD: 0 K/UL (ref 0–0.1)
BASOPHILS NFR BLD: 0 % (ref 0–2)
BILIRUB SERPL-MCNC: 0.6 MG/DL (ref 0.2–1)
BILIRUB UR QL: NEGATIVE
BNP SERPL-MCNC: ABNORMAL PG/ML (ref 0–900)
BUN SERPL-MCNC: 46 MG/DL (ref 7–18)
BUN/CREAT SERPL: 17 (ref 12–20)
CALCIUM SERPL-MCNC: 9.4 MG/DL (ref 8.5–10.1)
CALCULATED P AXIS, ECG09: 42 DEGREES
CALCULATED P AXIS, ECG09: 43 DEGREES
CALCULATED R AXIS, ECG10: -28 DEGREES
CALCULATED R AXIS, ECG10: -33 DEGREES
CALCULATED T AXIS, ECG11: 123 DEGREES
CALCULATED T AXIS, ECG11: 132 DEGREES
CHLORIDE SERPL-SCNC: 109 MMOL/L (ref 100–111)
CK SERPL-CCNC: 2763 U/L (ref 26–192)
CO2 SERPL-SCNC: 27 MMOL/L (ref 21–32)
COLOR UR: YELLOW
CREAT SERPL-MCNC: 2.7 MG/DL (ref 0.6–1.3)
DIAGNOSIS, 93000: NORMAL
DIAGNOSIS, 93000: NORMAL
DIFFERENTIAL METHOD BLD: ABNORMAL
ECHO AO ASC DIAM: 3.7 CM
ECHO AO ASCENDING AORTA INDEX: 1.85 CM/M2
ECHO AO ROOT DIAM: 3.6 CM
ECHO AO ROOT INDEX: 1.8 CM/M2
ECHO AV AREA PEAK VELOCITY: 1.8 CM2
ECHO AV AREA VTI: 1.9 CM2
ECHO AV AREA/BSA PEAK VELOCITY: 0.9 CM2/M2
ECHO AV AREA/BSA VTI: 1 CM2/M2
ECHO AV MEAN GRADIENT: 2 MMHG
ECHO AV MEAN VELOCITY: 0.6 M/S
ECHO AV PEAK GRADIENT: 3 MMHG
ECHO AV PEAK VELOCITY: 0.8 M/S
ECHO AV VELOCITY RATIO: 0.63
ECHO AV VTI: 15.1 CM
ECHO EST RA PRESSURE: 15 MMHG
ECHO LA DIAMETER INDEX: 3.1 CM/M2
ECHO LA DIAMETER: 6.2 CM
ECHO LA TO AORTIC ROOT RATIO: 1.72
ECHO LA VOL 2C: 102 ML (ref 22–52)
ECHO LA VOL 4C: 119 ML (ref 22–52)
ECHO LA VOL BP: 113 ML (ref 22–52)
ECHO LA VOL/BSA BIPLANE: 57 ML/M2 (ref 16–34)
ECHO LA VOLUME AREA LENGTH: 118 ML
ECHO LA VOLUME INDEX A2C: 51 ML/M2 (ref 16–34)
ECHO LA VOLUME INDEX A4C: 60 ML/M2 (ref 16–34)
ECHO LA VOLUME INDEX AREA LENGTH: 59 ML/M2 (ref 16–34)
ECHO LV E' LATERAL VELOCITY: 5 CM/S
ECHO LV E' SEPTAL VELOCITY: 5 CM/S
ECHO LV EDV A2C: 162 ML
ECHO LV EDV A4C: 106 ML
ECHO LV EDV BP: 131 ML (ref 56–104)
ECHO LV EDV INDEX A4C: 53 ML/M2
ECHO LV EDV INDEX BP: 66 ML/M2
ECHO LV EDV NDEX A2C: 81 ML/M2
ECHO LV EJECTION FRACTION A2C: 20 %
ECHO LV EJECTION FRACTION A4C: 10 %
ECHO LV EJECTION FRACTION BIPLANE: 19 % (ref 55–100)
ECHO LV ESV A2C: 130 ML
ECHO LV ESV A4C: 95 ML
ECHO LV ESV BP: 106 ML (ref 19–49)
ECHO LV ESV INDEX A2C: 65 ML/M2
ECHO LV ESV INDEX A4C: 48 ML/M2
ECHO LV ESV INDEX BP: 53 ML/M2
ECHO LV FRACTIONAL SHORTENING: 14 % (ref 28–44)
ECHO LV INTERNAL DIMENSION DIASTOLE INDEX: 2.55 CM/M2
ECHO LV INTERNAL DIMENSION DIASTOLIC: 5.1 CM (ref 3.9–5.3)
ECHO LV INTERNAL DIMENSION SYSTOLIC INDEX: 2.2 CM/M2
ECHO LV INTERNAL DIMENSION SYSTOLIC: 4.4 CM
ECHO LV IVSD: 1.9 CM (ref 0.6–0.9)
ECHO LV MASS 2D: 438.1 G (ref 67–162)
ECHO LV MASS INDEX 2D: 219.1 G/M2 (ref 43–95)
ECHO LV POSTERIOR WALL DIASTOLIC: 1.7 CM (ref 0.6–0.9)
ECHO LV RELATIVE WALL THICKNESS RATIO: 0.67
ECHO LVOT AREA: 3.5 CM2
ECHO LVOT AV VTI INDEX: 0.56
ECHO LVOT CARDIAC OUTPUT: 0 LITER/MINUTE
ECHO LVOT DIAM: 2.1 CM
ECHO LVOT MEAN GRADIENT: 0 MMHG
ECHO LVOT PEAK GRADIENT: 1 MMHG
ECHO LVOT PEAK VELOCITY: 0.5 M/S
ECHO LVOT STROKE VOLUME INDEX: 14.7 ML/M2
ECHO LVOT SV: 29.4 ML
ECHO LVOT VTI: 8.5 CM
ECHO MV A VELOCITY: 0.39 M/S
ECHO MV AREA VTI: 1.6 CM2
ECHO MV E DECELERATION TIME (DT): 100 MS
ECHO MV E VELOCITY: 0.74 M/S
ECHO MV E/A RATIO: 1.9
ECHO MV E/E' LATERAL: 14.8
ECHO MV E/E' RATIO (AVERAGED): 14.8
ECHO MV E/E' SEPTAL: 14.8
ECHO MV LVOT VTI INDEX: 2.11
ECHO MV MAX VELOCITY: 0.9 M/S
ECHO MV MEAN GRADIENT: 1 MMHG
ECHO MV MEAN VELOCITY: 0.5 M/S
ECHO MV PEAK GRADIENT: 3 MMHG
ECHO MV VTI: 17.9 CM
ECHO PULMONARY ARTERY END DIASTOLIC PRESSURE: 8 MMHG
ECHO PV REGURGITANT MAX VELOCITY: 1.5 M/S
ECHO RIGHT VENTRICULAR SYSTOLIC PRESSURE (RVSP): 60 MMHG
ECHO RV FREE WALL PEAK S': 9 CM/S
ECHO RV INTERNAL DIMENSION: 4.1 CM
ECHO RV TAPSE: 1.1 CM (ref 1.7–?)
ECHO TV REGURGITANT MAX VELOCITY: 3.35 M/S
ECHO TV REGURGITANT PEAK GRADIENT: 45 MMHG
EOSINOPHIL # BLD: 0 K/UL (ref 0–0.4)
EOSINOPHIL NFR BLD: 1 % (ref 0–5)
EPITH CASTS URNS QL MICRO: ABNORMAL /LPF (ref 0–5)
ERYTHROCYTE [DISTWIDTH] IN BLOOD BY AUTOMATED COUNT: 16 % (ref 11.6–14.5)
GLOBULIN SER CALC-MCNC: 3.2 G/DL (ref 2–4)
GLUCOSE SERPL-MCNC: 105 MG/DL (ref 74–99)
GLUCOSE UR STRIP.AUTO-MCNC: NEGATIVE MG/DL
HCT VFR BLD AUTO: 36.4 % (ref 35–45)
HGB BLD-MCNC: 11.1 G/DL (ref 12–16)
HGB UR QL STRIP: NEGATIVE
HYALINE CASTS URNS QL MICRO: ABNORMAL /LPF (ref 0–2)
IMM GRANULOCYTES # BLD AUTO: 0 K/UL (ref 0–0.04)
IMM GRANULOCYTES NFR BLD AUTO: 0 % (ref 0–0.5)
INR PPP: 1.1 (ref 0.8–1.2)
KETONES UR QL STRIP.AUTO: ABNORMAL MG/DL
LEUKOCYTE ESTERASE UR QL STRIP.AUTO: ABNORMAL
LIPASE SERPL-CCNC: 80 U/L (ref 73–393)
LYMPHOCYTES # BLD: 0.8 K/UL (ref 0.9–3.6)
LYMPHOCYTES NFR BLD: 12 % (ref 21–52)
MCH RBC QN AUTO: 28.1 PG (ref 24–34)
MCHC RBC AUTO-ENTMCNC: 30.5 G/DL (ref 31–37)
MCV RBC AUTO: 92.2 FL (ref 78–100)
MONOCYTES # BLD: 0.5 K/UL (ref 0.05–1.2)
MONOCYTES NFR BLD: 8 % (ref 3–10)
NEUTS SEG # BLD: 4.9 K/UL (ref 1.8–8)
NEUTS SEG NFR BLD: 79 % (ref 40–73)
NITRITE UR QL STRIP.AUTO: NEGATIVE
NRBC # BLD: 0 K/UL (ref 0–0.01)
NRBC BLD-RTO: 0 PER 100 WBC
P-R INTERVAL, ECG05: 166 MS
P-R INTERVAL, ECG05: 170 MS
PH UR STRIP: 5 [PH] (ref 5–8)
PLATELET # BLD AUTO: 192 K/UL (ref 135–420)
PMV BLD AUTO: 12.5 FL (ref 9.2–11.8)
POTASSIUM SERPL-SCNC: 3.4 MMOL/L (ref 3.5–5.5)
PROT SERPL-MCNC: 6.5 G/DL (ref 6.4–8.2)
PROT UR STRIP-MCNC: 100 MG/DL
PROTHROMBIN TIME: 14.7 SEC (ref 11.5–15.2)
Q-T INTERVAL, ECG07: 426 MS
Q-T INTERVAL, ECG07: 436 MS
QRS DURATION, ECG06: 130 MS
QRS DURATION, ECG06: 132 MS
QTC CALCULATION (BEZET), ECG08: 494 MS
QTC CALCULATION (BEZET), ECG08: 506 MS
RBC # BLD AUTO: 3.95 M/UL (ref 4.2–5.3)
RBC #/AREA URNS HPF: NEGATIVE /HPF (ref 0–5)
SODIUM SERPL-SCNC: 144 MMOL/L (ref 136–145)
SP GR UR REFRACTOMETRY: 1.02 (ref 1–1.03)
TROPONIN-HIGH SENSITIVITY: ABNORMAL NG/L (ref 0–54)
TROPONIN-HIGH SENSITIVITY: ABNORMAL NG/L (ref 0–54)
UROBILINOGEN UR QL STRIP.AUTO: 1 EU/DL (ref 0.2–1)
VENTRICULAR RATE, ECG03: 81 BPM
VENTRICULAR RATE, ECG03: 81 BPM
WBC # BLD AUTO: 6.2 K/UL (ref 4.6–13.2)
WBC URNS QL MICRO: ABNORMAL /HPF (ref 0–5)

## 2022-05-20 PROCEDURE — 81001 URINALYSIS AUTO W/SCOPE: CPT

## 2022-05-20 PROCEDURE — 96374 THER/PROPH/DIAG INJ IV PUSH: CPT

## 2022-05-20 PROCEDURE — 96375 TX/PRO/DX INJ NEW DRUG ADDON: CPT

## 2022-05-20 PROCEDURE — 80053 COMPREHEN METABOLIC PANEL: CPT

## 2022-05-20 PROCEDURE — 74011250636 HC RX REV CODE- 250/636: Performed by: INTERNAL MEDICINE

## 2022-05-20 PROCEDURE — 65270000046 HC RM TELEMETRY

## 2022-05-20 PROCEDURE — 85730 THROMBOPLASTIN TIME PARTIAL: CPT

## 2022-05-20 PROCEDURE — 74011250637 HC RX REV CODE- 250/637: Performed by: EMERGENCY MEDICINE

## 2022-05-20 PROCEDURE — 93005 ELECTROCARDIOGRAM TRACING: CPT

## 2022-05-20 PROCEDURE — 74011000258 HC RX REV CODE- 258: Performed by: HOSPITALIST

## 2022-05-20 PROCEDURE — 84484 ASSAY OF TROPONIN QUANT: CPT

## 2022-05-20 PROCEDURE — 83690 ASSAY OF LIPASE: CPT

## 2022-05-20 PROCEDURE — 83880 ASSAY OF NATRIURETIC PEPTIDE: CPT

## 2022-05-20 PROCEDURE — 74011250636 HC RX REV CODE- 250/636: Performed by: EMERGENCY MEDICINE

## 2022-05-20 PROCEDURE — 99285 EMERGENCY DEPT VISIT HI MDM: CPT

## 2022-05-20 PROCEDURE — 82550 ASSAY OF CK (CPK): CPT

## 2022-05-20 PROCEDURE — 74011250637 HC RX REV CODE- 250/637: Performed by: HOSPITALIST

## 2022-05-20 PROCEDURE — 77010033678 HC OXYGEN DAILY

## 2022-05-20 PROCEDURE — 85025 COMPLETE CBC W/AUTO DIFF WBC: CPT

## 2022-05-20 PROCEDURE — C8929 TTE W OR WO FOL WCON,DOPPLER: HCPCS

## 2022-05-20 PROCEDURE — 71045 X-RAY EXAM CHEST 1 VIEW: CPT

## 2022-05-20 PROCEDURE — 74011000250 HC RX REV CODE- 250: Performed by: HOSPITALIST

## 2022-05-20 PROCEDURE — 85610 PROTHROMBIN TIME: CPT

## 2022-05-20 PROCEDURE — 74011250636 HC RX REV CODE- 250/636: Performed by: HOSPITALIST

## 2022-05-20 PROCEDURE — 99223 1ST HOSP IP/OBS HIGH 75: CPT | Performed by: INTERNAL MEDICINE

## 2022-05-20 RX ORDER — MORPHINE SULFATE 4 MG/ML
4 INJECTION INTRAVENOUS
Status: COMPLETED | OUTPATIENT
Start: 2022-05-20 | End: 2022-05-20

## 2022-05-20 RX ORDER — HYDRALAZINE HYDROCHLORIDE 10 MG/1
10 TABLET, FILM COATED ORAL 3 TIMES DAILY
Status: DISCONTINUED | OUTPATIENT
Start: 2022-05-20 | End: 2022-05-26 | Stop reason: HOSPADM

## 2022-05-20 RX ORDER — SODIUM CHLORIDE 9 MG/ML
50 INJECTION, SOLUTION INTRAVENOUS CONTINUOUS
Status: DISCONTINUED | OUTPATIENT
Start: 2022-05-20 | End: 2022-05-20

## 2022-05-20 RX ORDER — ONDANSETRON 2 MG/ML
4 INJECTION INTRAMUSCULAR; INTRAVENOUS
Status: COMPLETED | OUTPATIENT
Start: 2022-05-20 | End: 2022-05-20

## 2022-05-20 RX ORDER — ONDANSETRON 2 MG/ML
4 INJECTION INTRAMUSCULAR; INTRAVENOUS
Status: DISCONTINUED | OUTPATIENT
Start: 2022-05-20 | End: 2022-05-26 | Stop reason: HOSPADM

## 2022-05-20 RX ORDER — SODIUM CHLORIDE 9 MG/ML
100 INJECTION, SOLUTION INTRAVENOUS ONCE
Status: DISCONTINUED | OUTPATIENT
Start: 2022-05-20 | End: 2022-05-20

## 2022-05-20 RX ORDER — MORPHINE SULFATE 2 MG/ML
2 INJECTION, SOLUTION INTRAMUSCULAR; INTRAVENOUS
Status: DISCONTINUED | OUTPATIENT
Start: 2022-05-20 | End: 2022-05-26 | Stop reason: HOSPADM

## 2022-05-20 RX ORDER — ALLOPURINOL 100 MG/1
100 TABLET ORAL DAILY
Status: DISCONTINUED | OUTPATIENT
Start: 2022-05-21 | End: 2022-05-26 | Stop reason: HOSPADM

## 2022-05-20 RX ORDER — POTASSIUM CHLORIDE 20 MEQ/1
20 TABLET, EXTENDED RELEASE ORAL
Status: COMPLETED | OUTPATIENT
Start: 2022-05-20 | End: 2022-05-20

## 2022-05-20 RX ORDER — SODIUM CHLORIDE 9 MG/ML
125 INJECTION, SOLUTION INTRAVENOUS ONCE
Status: COMPLETED | OUTPATIENT
Start: 2022-05-20 | End: 2022-05-20

## 2022-05-20 RX ORDER — DIPHENHYDRAMINE HYDROCHLORIDE 50 MG/ML
12.5 INJECTION, SOLUTION INTRAMUSCULAR; INTRAVENOUS
Status: DISCONTINUED | OUTPATIENT
Start: 2022-05-20 | End: 2022-05-26 | Stop reason: HOSPADM

## 2022-05-20 RX ORDER — CLOPIDOGREL BISULFATE 75 MG/1
300 TABLET ORAL
Status: COMPLETED | OUTPATIENT
Start: 2022-05-20 | End: 2022-05-20

## 2022-05-20 RX ORDER — FUROSEMIDE 10 MG/ML
20 INJECTION INTRAMUSCULAR; INTRAVENOUS DAILY
Status: DISCONTINUED | OUTPATIENT
Start: 2022-05-21 | End: 2022-05-22

## 2022-05-20 RX ORDER — ATORVASTATIN CALCIUM 20 MG/1
40 TABLET, FILM COATED ORAL
Status: DISCONTINUED | OUTPATIENT
Start: 2022-05-20 | End: 2022-05-20

## 2022-05-20 RX ORDER — ACETAMINOPHEN 325 MG/1
650 TABLET ORAL
Status: DISCONTINUED | OUTPATIENT
Start: 2022-05-20 | End: 2022-05-26 | Stop reason: HOSPADM

## 2022-05-20 RX ORDER — CARVEDILOL 3.12 MG/1
6.25 TABLET ORAL 2 TIMES DAILY WITH MEALS
Status: DISCONTINUED | OUTPATIENT
Start: 2022-05-20 | End: 2022-05-26 | Stop reason: HOSPADM

## 2022-05-20 RX ORDER — HEPARIN SODIUM 1000 [USP'U]/ML
4000 INJECTION, SOLUTION INTRAVENOUS; SUBCUTANEOUS ONCE
Status: COMPLETED | OUTPATIENT
Start: 2022-05-20 | End: 2022-05-20

## 2022-05-20 RX ORDER — SODIUM CHLORIDE 9 MG/ML
50 INJECTION, SOLUTION INTRAVENOUS CONTINUOUS
Status: DISPENSED | OUTPATIENT
Start: 2022-05-20 | End: 2022-05-20

## 2022-05-20 RX ORDER — GUAIFENESIN 100 MG/5ML
81 LIQUID (ML) ORAL DAILY
Status: DISCONTINUED | OUTPATIENT
Start: 2022-05-21 | End: 2022-05-25

## 2022-05-20 RX ORDER — CLOPIDOGREL BISULFATE 75 MG/1
300 TABLET ORAL DAILY
Status: DISCONTINUED | OUTPATIENT
Start: 2022-05-21 | End: 2022-05-20

## 2022-05-20 RX ORDER — HEPARIN SODIUM 1000 [USP'U]/ML
3000 INJECTION, SOLUTION INTRAVENOUS; SUBCUTANEOUS ONCE
Status: COMPLETED | OUTPATIENT
Start: 2022-05-20 | End: 2022-05-20

## 2022-05-20 RX ADMIN — SODIUM CHLORIDE 50 ML/HR: 900 INJECTION, SOLUTION INTRAVENOUS at 17:23

## 2022-05-20 RX ADMIN — PERFLUTREN 1 ML: 6.52 INJECTION, SUSPENSION INTRAVENOUS at 17:00

## 2022-05-20 RX ADMIN — MORPHINE SULFATE 4 MG: 4 INJECTION INTRAVENOUS at 12:19

## 2022-05-20 RX ADMIN — HEPARIN SODIUM 11.2 UNITS/KG/HR: 5000 INJECTION INTRAVENOUS; SUBCUTANEOUS at 14:05

## 2022-05-20 RX ADMIN — POTASSIUM CHLORIDE 20 MEQ: 20 TABLET, EXTENDED RELEASE ORAL at 17:22

## 2022-05-20 RX ADMIN — SODIUM CHLORIDE 125 ML/HR: 900 INJECTION, SOLUTION INTRAVENOUS at 15:01

## 2022-05-20 RX ADMIN — ONDANSETRON 4 MG: 2 INJECTION INTRAMUSCULAR; INTRAVENOUS at 12:19

## 2022-05-20 RX ADMIN — HYDRALAZINE HYDROCHLORIDE 10 MG: 10 TABLET, FILM COATED ORAL at 21:58

## 2022-05-20 RX ADMIN — CEFTRIAXONE 1 G: 1 INJECTION, POWDER, FOR SOLUTION INTRAMUSCULAR; INTRAVENOUS at 17:22

## 2022-05-20 RX ADMIN — SODIUM CHLORIDE 50 ML/HR: 900 INJECTION, SOLUTION INTRAVENOUS at 17:00

## 2022-05-20 RX ADMIN — CARVEDILOL 6.25 MG: 3.12 TABLET, FILM COATED ORAL at 17:22

## 2022-05-20 RX ADMIN — HEPARIN SODIUM 3000 UNITS: 1000 INJECTION INTRAVENOUS; SUBCUTANEOUS at 22:57

## 2022-05-20 RX ADMIN — CLOPIDOGREL BISULFATE 300 MG: 75 TABLET ORAL at 14:05

## 2022-05-20 RX ADMIN — SODIUM CHLORIDE 1000 ML: 900 INJECTION, SOLUTION INTRAVENOUS at 15:16

## 2022-05-20 RX ADMIN — HYDRALAZINE HYDROCHLORIDE 10 MG: 10 TABLET, FILM COATED ORAL at 17:22

## 2022-05-20 RX ADMIN — HEPARIN SODIUM 4000 UNITS: 1000 INJECTION INTRAVENOUS; SUBCUTANEOUS at 14:05

## 2022-05-20 RX ADMIN — FAMOTIDINE 20 MG: 10 INJECTION INTRAVENOUS at 21:58

## 2022-05-20 RX ADMIN — NITROGLYCERIN 1 INCH: 20 OINTMENT TOPICAL at 21:59

## 2022-05-20 RX ADMIN — NITROGLYCERIN 1 INCH: 20 OINTMENT TOPICAL at 12:19

## 2022-05-20 NOTE — Clinical Note
TRANSFER - OUT REPORT:     Verbal report given to: ICU. Report consisted of patient's Situation, Background, Assessment and   Recommendations(SBAR). Opportunity for questions and clarification was provided. Patient transported with a Registered Nurse.

## 2022-05-20 NOTE — Clinical Note
TRANSFER - IN REPORT:     Verbal report received from: icu. Report consisted of patient's Situation, Background, Assessment and   Recommendations(SBAR). Opportunity for questions and clarification was provided. Assessment completed upon patient's arrival to unit and care assumed. Patient transported with a Registered Nurse.

## 2022-05-20 NOTE — H&P
[facility]  HISTORY AND PHYSICAL    Name:  Aamir Husbands  MR#:   [id number]  :  1955  ACCOUNT #:  [de-identified]  ADMIT DATE:  2022    ADMITTING DIAGNOSES:  1.  Non-ST-elevation myocardial infarction. 2.  Coronary disease with history of four stents. 3.  Chronic kidney disease, stage IV. 4.  Hypertension. 5.  History of gout. 6.  History of breast cancer with double mastectomy. HISTORY OF PRESENT ILLNESS:  This is a 65-year-old female with extensive medical history, congestive heart failure, chronic kidney disease, coronary disease with stents, hypertension. She came into the hospital today through the emergency department complaining of chest pain that started last night, but it was increased this morning. She had no fever, no cough. She was placed on an EKG monitor and had labs done. Troponin came back essentially unreadable due to the elevated state. Chest pain has resolved with nitroglycerin and supportive treatments. Currently, she is chest pain free. She has had a Cardiology consultation, who recommends heparinization and cardiac cath in the morning. PAST MEDICAL HISTORY:  Includes:  1. Congestive heart failure. 2.  Chronic kidney disease. 3.  Coronary disease, status post previous stenting. 4.  Breast cancer. 5.  Double mastectomy. 6.  Gout. 7.  Chronic systolic heart failure. 8.  Dysarthria. 9.  She is vaccinated against COVID. PAST SURGICAL HISTORY:  Previous other surgeries include:  1. Tonsillectomy. 2.  Coronary stents. 3.  Mastectomy. FAMILY HISTORY:  Diabetes, high blood pressure, heart failure, breast cancer in her mother, diabetes and heart failure in her mother. SOCIAL HISTORY:  Nonsmoker. No regular alcohol use. There is a sister, Brayden Bejarano for emergency [id number]. REVIEW OF SYSTEMS:  GENERAL:  No fevers or chills. RESPIRATORY:  No shortness of breath, cough, or wheezing. CARDIOVASCULAR:  Anterior substernal chest pain.   No radiation to the arm or jaw, now resolved. No improvement with symptomatic treatment at home. No vomiting. No leg swelling. GASTROINTESTINAL:  No nausea, vomiting, diarrhea, or abdominal pain. GENITOURINARY:  No dysuria or hematuria. HEMATOLOGIC:  No bleeding or bruisability. NEUROLOGIC:  No dizziness, lightheadedness, or syncopal event. PHYSICAL EXAMINATION:  GENERAL:  She is a 77-year-old [facility] female, who appears comfortable. VITAL SIGNS:  Pulse of 79, blood pressure 120/89, respiratory rate 19, SaO2 95% on 4 L. NECK:  Shows no JVD or thyromegaly. CHEST:  Anteriorly clear with no rales, rhonchi, or wheezes. CARDIAC:  Regular rate and rhythm. No murmur, rub, or gallop. ABDOMEN:  Soft, nontender, no distention. EXTREMITIES:  Lower extremities, 1+ edema to the midcalf bilaterally. Distal pulses palpable. NEUROLOGIC:  Mentation is appropriate. LABORATORY DATA:  Potassium 3.4, creatinine 2.7. Her GFR is 21. Her troponin was 125,000, repeat also greater than 125,000. BNP 14,410. ALT is 83, AST is 537. Creatinine 2.70, sodium 144. H and H is 11.1 and 36.4. Platelets are normal.  White count is normal.  Urinalysis; 11-20 wbc's, 1+ bacteria, small leukocyte esterase. Chest x-ray shows no acute pulmonary process. EKG; normal sinus rhythm, possible left atrial enlargement, LVH changes. Echo is being done currently. ALLERGIES:  SHE HAS AN ALLERGY TO SULFA. ASSESSMENT:  1.  Non-ST-elevation myocardial infarction. 2.  Coronary disease with previous stenting. 3.  Chronic systolic congestive heart failure. 4.  Possible urinary tract infection. 5.  Hypertension. 6.  Chronic kidney disease, stage IV. 7.  Hypokalemia. PLAN:  Continue heparin drip. Gentle IV fluids. With her chronic systolic heart failure, I would not go longer than six hours for this. We will do five more hours in the emergency room as she received a fluid bolus already.   We will continue aspirin, Coreg, hydralazine, and the Nitro-Bid ointment that she has in place. One dose of potassium has been ordered. I am going to hold her statin because her LFTs are notably elevated. We need to repeat the LFTs in the morning that could be from passive congestion from heart failure and acute cardiac process. I am going to put her on Pepcid and I have p.r.n. medications ordered for her. Also, now she is chest pain free. Her CK is also elevated at 2763, will repeat that in the morning. Again, be cautious with the fluids just because she has a depressed ejection fraction and is likely to go in a congestive heart failure. Her condition is guarded. I anticipate at least three to four days in the hospital for this cardiology evaluation is imminent. Echo result pending for this afternoon. Continue supportive care at this point.       Trevin Edwards MD      [id number]  D:  05/20/2022 16:30  T:  05/20/2022 18:27  JOB #:  [id number]

## 2022-05-20 NOTE — Clinical Note
Contrast Dose Calculator:   Patient's age: 74.   Patient's sex: Female. Patient weight (kg) = 85.7. Creatinine level (mg/dL) = 2.7. Creatinine clearance (mL/min): 28.1. Max Contrast dose per Creatinine Cl calculator = 63.23 mL.

## 2022-05-20 NOTE — ED PROVIDER NOTES
51-year-old female with history of CHF, hypertension, and CAD status post stenting presents emergency department with complaint of chest pain. She said the chest pain actually started last night however when she woke this morning had increased and that she called marijuana prior to the hospital.  She arrives emergency department speaking in complete sentences. EKG shows multiple changes but no ST elevations. Bundle branch block is present no tachycardia no fever oxygen saturation 98% on room air. Blood pressure 139/98. She is immediately placed in a room IV started labs sent chest x-ray ordered. EKG done and placed on monitor           Past Medical History:   Diagnosis Date    [facility]F [facility]estive heart failure) ([facility] [address]     Hypertension        No past surgical history on file. No family history on file. Social History     Socioeconomic History    Marital status:      Spouse name: Not on file    Number of children: Not on file    Years of education: Not on file    Highest education level: Not on file   Occupational History    Not on file   Tobacco Use    Smoking status: Never Smoker    Smokeless tobacco: Never Used   Substance and Sexual Activity    Alcohol use: Not Currently    Drug use: Never    Sexual activity: Not on file   Other Topics Concern    Not on file   Social History Narrative    Not on file     Social Determinants of Health     Financial Resource Strain:     Difficulty of Paying Living Expenses: Not on file   Food Insecurity:     Worried About Running Out of Food in the Last Year: Not on file    Ceasar of Food in the Last Year: Not on file   Transportation Needs:     Lack of Transportation (Medical): Not on file    Lack of Transportation (Non-Medical):  Not on file   Physical Activity:     Days of Exercise per Week: Not on file    Minutes of Exercise per Session: Not on file   Stress:     Feeling of Stress : Not on file   Social Connections:     Frequency of Communication with Friends and Family: Not on file    Frequency of Social Gatherings with Friends and Family: Not on file    Attends Sabianism Services: Not on file    Active Member of Clubs or Organizations: Not on file    Attends Club or Organization Meetings: Not on file    Marital Status: Not on file   Intimate Partner Violence:     Fear of Current or Ex-Partner: Not on file    Emotionally Abused: Not on file    Physically Abused: Not on file    Sexually Abused: Not on file   Housing Stability:     Unable to Pay for Housing in the Last Year: Not on file    Number of [place] in the Last Year: Not on file    Unstable Housing in the Last Year: Not on file         ALLERGIES: Sulfa (sulfonamide antibiotics)    Review of Systems   Constitutional: Negative. HENT: Negative. Eyes: Negative. Respiratory: Positive for chest tightness. Negative for apnea, cough, choking, shortness of breath, wheezing and stridor. Cardiovascular: Positive for chest pain. Negative for palpitations and leg swelling. Gastrointestinal: Negative. Genitourinary: Negative. Musculoskeletal: Negative. Neurological: Negative. Hematological: Negative. Psychiatric/Behavioral: Negative. Vitals:    05/20/22 1146   BP: (!) 139/98   Pulse: 80   Resp: 15   Temp: 98.3 °F (36.8 °C)   SpO2: 98%   Weight: 88.5 kg (195 lb 1.6 oz)   Height: 5' 7\" (1.702 m)            Physical Exam  Vitals and nursing note reviewed. Constitutional:       General: She is not in acute distress. Appearance: She is well-developed. She is not ill-appearing or toxic-appearing. HENT:      Head: Normocephalic and atraumatic. Neck:      Thyroid: No thyromegaly. Vascular: No hepatojugular reflux or JVD. Cardiovascular:      Rate and Rhythm: Normal rate and regular rhythm. Extrasystoles are present. Pulses:           Carotid pulses are 2+ on the right side and 2+ on the left side.        Radial pulses are 2+ on the right side and 2+ on the left side. Dorsalis pedis pulses are 2+ on the right side and 2+ on the left side. Posterior tibial pulses are 2+ on the right side and 2+ on the left side. Heart sounds: Heart sounds not distant. Murmur heard. Systolic murmur is present with a grade of 2/6. No diastolic murmur is present. No friction rub. No gallop. No S3 or S4 sounds. Pulmonary:      Effort: Pulmonary effort is normal. No tachypnea, accessory muscle usage or respiratory distress. Breath sounds: Normal breath sounds. No stridor. Chest:      Chest wall: No mass or tenderness. Abdominal:      General: Bowel sounds are normal.      Palpations: Abdomen is soft. There is no fluid wave or splenomegaly. Musculoskeletal:         General: Normal range of motion. Cervical back: Normal range of motion and neck supple. Right lower leg: No edema. Left lower leg: No edema. Skin:     General: Skin is warm and dry. Capillary Refill: Capillary refill takes less than 2 seconds. Neurological:      General: No focal deficit present. Mental Status: She is alert and oriented to person, place, and time. Psychiatric:         Mood and Affect: Mood normal.         Behavior: Behavior normal.          MDM  Number of Diagnoses or Management Options  Diagnosis management comments: 72-year-old female history of hypertension hyperlipidemia CAD with 1 stent done at [facility] presents emergency department with complaint of chest pain 1 episode last night that resolved however patient woke up this morning with chest pain that awoke her from sleep presented to the emergency room 2 hours afterwards. Arrival patient had some widened QRS in her lateral leads on EKG but no ST elevations that would qualify as a STEMI. She does have some nonspecific ST changes. IV started. Labs sent. Pt placed on cardiac monitor. Xray ordered. Pt was given ASA in enroute and has taken x2 NTG.   Still having active CP. NTG paste applied.  Morphine ordered    2:11 PM  Critical Result Trop >125K  Dr Richmond Stevenson (Cards)  Heparin Started  Plavix Given    2:28 PM  Spoke with Dr. Richmond Arriaga who will see pt    Dr Con Morgan pt  Cr is elevated (acute on chronic)  CK is elevated    Repeat Trop was again >125K   advises hydration and will likely Cath in am    Spoke with Dr. Abad Medley  Ad[facility] to [facility]  Performed by: Yaritza Albert MD  Authorized by: Yaritza Albert MD     Critical care provider statement:     Critical care time (minutes):  45    Critical care time was exclusive of:  Separately billable procedures and treating other patients    Critical care was necessary to treat or prevent imminent or life-threatening deterioration of the following conditions:  Cardiac failure, dehydration and renal failure    Critical care was time spent personally by me on the following activities:  Blood draw for specimens, development of treatment plan with patient or surrogate, discussions with consultants, evaluation of patient's response to treatment, examination of patient, interpretation of cardiac output measurements, obtaining history from patient or surrogate, ordering and performing treatments and interventions, ordering and review of laboratory studies, ordering and review of radiographic studies, re-evaluation of patient's condition, pulse oximetry and review of old charts

## 2022-05-20 NOTE — PROGRESS NOTES
Bedside and Verbal shift change report given to Renetta Haque (oncoming nurse) by Ana Rosa Martinez (offgoing nurse).  Report included the following information SBAR, Kardex, Intake/Output, MAR, Recent Results and Cardiac Rhythm SR.

## 2022-05-20 NOTE — ED TRIAGE NOTES
Pt arrive ems with c/o chest pain. Per report from ems chest pain started this AM at 0700. Pt took 1 nitro at home, ems gave pt 324 mg of aspirin. Pt have PMHX of bilateral mastectomy ( 2014, 2018 ), mini stroke and mini heart attack. Pt able to make needs known.

## 2022-05-20 NOTE — CONSULTS
Cardiolology  Inpatient Consult      Patient: Ignacia Orozco               Sex: female          DOA: 5/20/2022       YOB: 1955      Age:  74 y.o.        LOS:  LOS: 0 days      Ignacia Orozco is a 74 y.o. female admitted for NSTEMI (non-ST elevated myocardial infarction) ([facility] [I21.4]     Recommendations:  · Continue current medications and measures  · Hydration hoping to improve renal function before coronary angiography  · Will assess again tomorrow morning and decide about cath sooner or whether to make it later or if at all  · Guideline directed medical therapy which may be limited because of her poor renal function. · Nephrology consult    Impression:  · Known coronary disease with prior stenting  · Probable ischemic cardiomyopathy  · Chest pain beginning yesterday  · Suspect non-STEMI  · Acute on chronic diastolic and systolic heart failure  · Chronic kidney disease -stage IV close to stage V.  · Hypertension  · Other problems as enumerated below    Patient Active Problem List    Diagnosis Date Noted    NSTEMI (non-ST elevated myocardial infarction) ([facility] 66/14/5984    Systolic CHF, chronic ([facility] 05/20/2022    Moderate malnutrition ([facility] 01/06/2021    Mild intermittent asthma without complication 83/46/0493    Malignant neoplasm of female breast ([facility] 11/24/2014    S/P bilateral mastectomy 05/02/2014    Stage 4 chronic kidney disease ([facility] 12/12/2013    Atherosclerosis of coronary artery 11/06/2013    Essential hypertension 11/06/2013      Lorna Arriola MD  Past Medical History:   Diagnosis Date    CHF (congestive heart failure) ([facility]     Hypertension       No past surgical history on file. Allergies   Allergen Reactions    Sulfa (Sulfonamide Antibiotics) Anxiety      No family history on file.    Current Facility-Administered Medications   Medication Dose Route Frequency    heparin 25,000 units in  mL infusion  11.2-25 Units/kg/hr IntraVENous TITRATE    [START ON 5/21/2022] aspirin chewable tablet 81 mg  81 mg Oral DAILY    [START ON 5/21/2022] furosemide (LASIX) injection 20 mg  20 mg IntraVENous DAILY    [START ON 5/21/2022] allopurinoL (ZYLOPRIM) tablet 100 mg  100 mg Oral DAILY    hydrALAZINE (APRESOLINE) tablet 10 mg  10 mg Oral TID    nitroglycerin (NITROBID) 2 % ointment 1 Inch  1 Inch Topical BID    carvediloL (COREG) tablet 6.25 mg  6.25 mg Oral BID WITH MEALS    acetaminophen (TYLENOL) tablet 650 mg  650 mg Oral Q6H PRN    morphine injection 2 mg  2 mg IntraVENous Q3H PRN    ondansetron (ZOFRAN) injection 4 mg  4 mg IntraVENous Q6H PRN    diphenhydrAMINE (BENADRYL) injection 12.5 mg  12.5 mg IntraVENous Q6H PRN    0.9% sodium chloride infusion  50 mL/hr IntraVENous CONTINUOUS    famotidine (PF) (PEPCID) 20 mg in 0.9% sodium chloride 10 mL injection  20 mg IntraVENous Q12H    cefTRIAXone (ROCEPHIN) 1 g in 0.9% sodium chloride (MBP/ADV) 50 mL MBP  1 g IntraVENous Q24H         Review of Symptoms:  Review of Systems   Constitutional: Negative. HENT: Negative. Eyes: Negative. Respiratory: Positive for chest tightness. Negative for apnea, cough, choking, shortness of breath, wheezing and stridor. Cardiovascular: Positive for chest pain. Negative for palpitations and leg swelling. Gastrointestinal: Negative. Genitourinary: Negative. Musculoskeletal: Negative. Neurological: Negative. Hematological: Negative. Psychiatric/Behavioral: Negative.         Subjective:  79-year-old female with history of CHF, hypertension, and CAD status post stenting presents emergency department with complaint of chest pain. She said the chest pain actually started last night however when she woke this morning had increased and that she called 911 prior to the hospital.  She arrives emergency department speaking in complete sentences. EKG shows multiple changes but no ST elevations.   IVCD vs LVH is present, no tachycardia, no fever, oxygen saturation 98% on room air.  Blood pressure 139/98. She is immediately placed in a room IV started labs sent chest x-ray ordered. EKG done and placed on monitor. Labs show severe kidney dysfunction with a creatinine of 2.7. Troponin was markedly elevated and CK was elevated as well. Echo shows global hypokinesis with no regional wall motion abnormality and severe systolic LV dysfunction with EF of 15 to 20% range. BNP is elevated. She has typical her typical chest pain on palpation of the chest wall laterally and on the right side. Clinically her pain does not sound ischemic at this point. At this juncture it appears that she has probably sustained a subendocardial myocardial infarction based on the echo and laboratory findings. Her EKG actually looks better than 1 done in January 2022. Because of her stage IV almost stage V renal disease I think we should postpone catheterization and reassess tomorrow. We will hydrate hoping to improve her GFR. I believe a contrast load at this juncture would push her over to needing dialysis acutely. Her chest pain started  approximately 24 hours ago; she may be passed the window for benefit from acute intervention.    .  Cardiac risk factors: Present. Physical Exam    Visit Vitals  /88   Pulse 78   Temp 98 °F (36.7 °C)   Resp 20   Ht 5' 7\" (1.702 m)   Wt 88.5 kg (195 lb)   SpO2 99%   BMI 30.54 kg/m²     Vitals and nursing note reviewed. Constitutional:       General: She is not in acute distress. Appearance: She is well-developed. She is not ill-appearing or toxic-appearing. HENT:      Head: Normocephalic and atraumatic. Neck:      Thyroid: No thyromegaly. Vascular: No hepatojugular reflux or JVD. Cardiovascular:      Rate and Rhythm: Normal rate and regular rhythm. Extrasystoles are present. Pulses:           Carotid pulses are 2+ on the right side and 2+ on the left side. Radial pulses are 2+ on the right side and 2+ on the left side. Dorsalis pedis pulses are 2+ on the right side and 2+ on the left side. Posterior tibial pulses are 2+ on the right side and 2+ on the left side. Heart sounds: Heart sounds not distant. Murmur heard. Systolic murmur is present with a grade of 2/6. No diastolic murmur is present. No friction rub. No gallop. No S3 or S4 sounds. Pulmonary:      Effort: Pulmonary effort is normal. No tachypnea, accessory muscle usage or respiratory distress. Breath sounds: Normal breath sounds. No stridor. Chest:      Chest wall: No mass or tenderness. Abdominal:      General: Bowel sounds are normal.      Palpations: Abdomen is soft. There is no fluid wave or splenomegaly. Musculoskeletal:         General: Normal range of motion. Cervical back: Normal range of motion and neck supple. Right lower leg: No edema. Left lower leg: No edema. Skin:     General: Skin is warm and dry. Capillary Refill: Capillary refill takes less than 2 seconds. Neurological:      General: No focal deficit present. Mental Status: She is alert and oriented to person, place, and time. Psychiatric:         Mood and Affect: Mood normal.         Behavior: Behavior normal.                                   Cardiographics    Telemetry: Sinus  ECG: No changes of STEMI. Current EKG looks little bit better than 1 January 2022  Echocardiogram: Reviewed.   Severe global hypokinesis and diastolic dysfunction    Recent radiology, intake/output and wt reviewed    Labs:   Recent Results (from the past 48 hour(s))   CBC WITH AUTOMATED DIFF    Collection Time: 05/20/22 11:45 AM   Result Value Ref Range    WBC 6.2 4.6 - 13.2 K/uL    RBC 3.95 (L) 4.20 - 5.30 M/uL    HGB 11.1 (L) 12.0 - 16.0 g/dL    HCT 36.4 35.0 - 45.0 %    MCV 92.2 78.0 - 100.0 FL    MCH 28.1 24.0 - 34.0 PG    MCHC 30.5 (L) 31.0 - 37.0 g/dL    RDW 16.0 (H) 11.6 - 14.5 %    PLATELET 190 475 - 041 K/uL    MPV 12.5 (H) 9.2 - 11.8 FL    NRBC 0.0 0  WBC    ABSOLUTE NRBC 0.00 0.00 - 0.01 K/uL    NEUTROPHILS 79 (H) 40 - 73 %    LYMPHOCYTES 12 (L) 21 - 52 %    MONOCYTES 8 3 - 10 %    EOSINOPHILS 1 0 - 5 %    BASOPHILS 0 0 - 2 %    IMMATURE GRANULOCYTES 0 0.0 - 0.5 %    ABS. NEUTROPHILS 4.9 1.8 - 8.0 K/UL    ABS. LYMPHOCYTES 0.8 (L) 0.9 - 3.6 K/UL    ABS. MONOCYTES 0.5 0.05 - 1.2 K/UL    ABS. EOSINOPHILS 0.0 0.0 - 0.4 K/UL    ABS. BASOPHILS 0.0 0.0 - 0.1 K/UL    ABS. IMM.  GRANS. 0.0 0.00 - 0.04 K/UL    DF AUTOMATED     PROTHROMBIN TIME + INR    Collection Time: 05/20/22 11:45 AM   Result Value Ref Range    Prothrombin time 14.7 11.5 - 15.2 sec    INR 1.1 0.8 - 1.2     PTT    Collection Time: 05/20/22 11:45 AM   Result Value Ref Range    aPTT 30.7 23.0 - 36.4 SEC   URINALYSIS W/ RFLX MICROSCOPIC    Collection Time: 05/20/22 12:00 PM   Result Value Ref Range    Color YELLOW      Appearance CLEAR      Specific gravity 1.017 1.005 - 1.030      pH (UA) 5.0 5.0 - 8.0      Protein 100 (A) NEG mg/dL    Glucose Negative NEG mg/dL    Ketone TRACE (A) NEG mg/dL    Bilirubin Negative NEG      Blood Negative NEG      Urobilinogen 1.0 0.2 - 1.0 EU/dL    Nitrites Negative NEG      Leukocyte Esterase SMALL (A) NEG     URINE MICROSCOPIC ONLY    Collection Time: 05/20/22 12:00 PM   Result Value Ref Range    WBC 11 to 20 0 - 5 /hpf    RBC Negative 0 - 5 /hpf    Epithelial cells 1+ 0 - 5 /lpf    Bacteria 1+ (A) NEG /hpf    Hyaline cast 11 to 20 0 - 2 /lpf   TROPONIN-HIGH SENSITIVITY    Collection Time: 05/20/22 12:42 PM   Result Value Ref Range    Troponin-High Sensitivity >125,000 (HH) 0 - 54 ng/L   CK    Collection Time: 05/20/22 12:50 PM   Result Value Ref Range    CK 2,763 (H) 26 - 192 U/L   LIPASE    Collection Time: 05/20/22 12:50 PM   Result Value Ref Range    Lipase 80 73 - 076 U/L   METABOLIC PANEL, COMPREHENSIVE    Collection Time: 05/20/22 12:50 PM   Result Value Ref Range    Sodium 144 136 - 145 mmol/L    Potassium 3.4 (L) 3.5 - 5.5 mmol/L    Chloride 109 100 - 111 mmol/L CO2 27 21 - 32 mmol/L    Anion gap 8 3.0 - 18 mmol/L    Glucose 105 (H) 74 - 99 mg/dL    BUN 46 (H) 7.0 - 18 MG/DL    Creatinine 2.70 (H) 0.6 - 1.3 MG/DL    BUN/Creatinine ratio 17 12 - 20      GFR est AA 21 (L) >60 ml/min/1.73m2    GFR est non-AA 18 (L) >60 ml/min/1.73m2    Calcium 9.4 8.5 - 10.1 MG/DL    Bilirubin, total 0.6 0.2 - 1.0 MG/DL    ALT (SGPT) 83 (H) 13 - 56 U/L    AST (SGOT) 537 (H) 10 - 38 U/L    Alk.  phosphatase 87 45 - 117 U/L    Protein, total 6.5 6.4 - 8.2 g/dL    Albumin 3.3 (L) 3.4 - 5.0 g/dL    Globulin 3.2 2.0 - 4.0 g/dL    A-G Ratio 1.0 0.8 - 1.7     NT-PRO BNP    Collection Time: 05/20/22 12:50 PM   Result Value Ref Range    NT pro-BNP 14,410 (H) 0 - 900 PG/ML   EKG, 12 LEAD, SUBSEQUENT    Collection Time: 05/20/22  1:34 PM   Result Value Ref Range    Ventricular Rate 81 BPM    Atrial Rate 81 BPM    P-R Interval 170 ms    QRS Duration 132 ms    Q-T Interval 436 ms    QTC Calculation (Bezet) 506 ms    Calculated P Axis 43 degrees    Calculated R Axis -33 degrees    Calculated T Axis 123 degrees    Diagnosis       Normal sinus rhythm  Possible Left atrial enlargement  Left axis deviation  Left ventricular hypertrophy with QRS widening and repolarization abnormality   ( R in aVL , Neftali product )  Cannot rule out Septal infarct (cited on or before 13-JAN-2022)  Abnormal ECG  When compared with ECG of 20-MAY-2022 11:42,  premature atrial complexes are no longer present     TROPONIN-HIGH SENSITIVITY    Collection Time: 05/20/22  2:00 PM   Result Value Ref Range    Troponin-High Sensitivity >125,000 (HH) 0 - 54 ng/L   ECHO ADULT COMPLETE    Collection Time: 05/20/22  4:50 PM   Result Value Ref Range    IVSd 1.9 (A) 0.6 - 0.9 cm    LVIDd 5.1 3.9 - 5.3 cm    LVIDs 4.4 cm    LVOT Diameter 2.1 cm    LVPWd 1.7 (A) 0.6 - 0.9 cm    LVOT Cardiac Output 0.0 liter/minute    EF BP 19 (A) 55 - 100 %    LV Ejection Fraction A2C 20 %    LV Ejection Fraction A4C 10 %    LV EDV A2C 162 mL    LV EDV A4C 106 mL    LV EDV  (A) 56 - 104 mL    LV ESV A2C 130 mL    LV ESV A4C 95 mL    LV ESV  (A) 19 - 49 mL    LVOT Peak Gradient 1 mmHg    LVOT Mean Gradient 0 mmHg    LVOT SV 29.4 ml    LVOT Peak Velocity 0.5 m/s    LVOT VTI 8.5 cm    RVIDd 4.1 cm    RV Free Wall Peak S' 9 cm/s    LA Diameter 6.2 cm    LA Volume A/L 118 mL    LA Volume 2C 102 (A) 22 - 52 mL    LA Volume 4C 119 (A) 22 - 52 mL    LA Volume  (A) 22 - 52 mL    AV Area by Peak Velocity 1.8 cm2    AV Area by VTI 1.9 cm2    AV Peak Gradient 3 mmHg    AV Mean Gradient 2 mmHg    AV Peak Velocity 0.8 m/s    AV Mean Velocity 0.6 m/s    AV VTI 15.1 cm    MV A Velocity 0.39 m/s    MV E Wave Deceleration Time 100.0 ms    MV E Velocity 0.74 m/s    LV E' Lateral Velocity 5 cm/s    LV E' Septal Velocity 5 cm/s    MV Area by VTI 1.6 cm2    MV Peak Gradient 3 mmHg    MV Mean Gradient 1 mmHg    MV Max Velocity 0.9 m/s    MV Mean Velocity 0.5 m/s    MV VTI 17.9 cm    Pulmonary Artery EDP 8 mmHg    NY Max Velocity 1.5 m/s    TAPSE 1.1 (A) 1.7 cm    TR Peak Gradient 45 mmHg    TR Max Velocity 3.35 m/s    Ascending Aorta 3.7 cm    Aortic Root 3.6 cm    Fractional Shortening 2D 14 28 - 44 %    LV ESV Index BP 53 mL/m2    LV EDV Index BP 66 mL/m2    LV ESV Index A4C 48 mL/m2    LV EDV Index A4C 53 mL/m2    LV ESV Index A2C 65 mL/m2    LV EDV Index A2C 81 mL/m2    LVIDd Index 2.55 cm/m2    LVIDs Index 2.20 cm/m2    LV RWT Ratio 0.67     LV Mass 2D 438.1 (A) 67 - 162 g    LV Mass 2D Index 219.1 (A) 43 - 95 g/m2    MV E/A 1.90     E/E' Ratio (Averaged) 14.80     E/E' Lateral 14.80     E/E' Septal 14.80     LA Volume Index BP 57 (A) 16 - 34 ml/m2    LA Volume Index A/L 59 16 - 34 mL/m2    LVOT Stroke Volume Index 14.7 mL/m2    LVOT Area 3.5 cm2    LA Volume Index 2C 51 (A) 16 - 34 mL/m2    LA Volume Index 4C 60 (A) 16 - 34 mL/m2    LA Size Index 3.10 cm/m2    LA/AO Root Ratio 1.72     Ao Root Index 1.80 cm/m2    Ascending Aorta Index 1.85 cm/m2    AV Velocity Ratio 0.63 LVOT:AV VTI Index 0.56     GIOVANA/BSA VTI 1.0 cm2/m2    GIOVANA/BSA Peak Velocity 0.9 cm2/m2    MV:LVOT VTI Index 2.11     Est. RA Pressure 15 mmHg    RVSP 60 mmHg           Alee Ortiz MD

## 2022-05-20 NOTE — ED NOTES
TRANSFER - OUT REPORT:    Verbal report given to [employer]  (name) on Rodolfo Beltre  being transferred to Tele (unit) for routine progression of care       Report consisted of patients Situation, Background, Assessment and   Recommendations(SBAR). Information from the following report(s) SBAR, ED Summary, MAR, Recent Results and Cardiac Rhythm NSR with left arterial enlargement, LVH. Leann Latch was reviewed with the receiving nurse. Lines:   Peripheral IV 05/20/22 Right Antecubital (Active)   Site Assessment Clean, dry, & intact 05/20/22 1150   Phlebitis Assessment 0 05/20/22 1150   Infiltration Assessment 0 05/20/22 1150   Dressing Status Clean, dry, & intact 05/20/22 1150   Dressing Type Transparent 05/20/22 1150   Hub Color/Line Status Flushed;Patent 05/20/22 1150   Action Taken Blood drawn 05/20/22 1150       Peripheral IV 05/20/22 Right Forearm (Active)   Site Assessment Clean, dry, & intact 05/20/22 1501   Phlebitis Assessment 0 05/20/22 1501   Infiltration Assessment 0 05/20/22 1501   Dressing Status Clean, dry, & intact 05/20/22 1501   Dressing Type Transparent 05/20/22 1501   Hub Color/Line Status Flushed;Patent 05/20/22 1501   Action Taken Blood drawn 05/20/22 1501       Peripheral IV 05/20/22 Left Hand (Active)   Site Assessment Clean, dry, & intact 05/20/22 1538   Phlebitis Assessment 0 05/20/22 1538   Infiltration Assessment 0 05/20/22 1538   Dressing Status Clean, dry, & intact 05/20/22 1538        Opportunity for questions and clarification was provided.       Patient transported with:   Registered Nurse

## 2022-05-20 NOTE — Clinical Note
Status[de-identified] INPATIENT [101]   Type of Bed: Telemetry [19]   Cardiac Monitoring Required?: Yes   Inpatient Hospitalization Certified Necessary for the Following Reasons: 3.  Patient receiving treatment that can only be provided in an inpatient setting (further clarification in H&P documentation)   Admitting Diagnosis: NSTEMI (non-ST elevated myocardial infarction) [facility]) [[id number]]   Admitting Physician: Gerardo Martin   Attending Physician: Ammy Mayen   Estimated Length of Stay: 5-7 Midnights   Discharge Plan[de-identified] Home with Office Follow-up

## 2022-05-21 LAB
ALBUMIN SERPL-MCNC: 3.1 G/DL (ref 3.4–5)
ALBUMIN/GLOB SERPL: 0.9 {RATIO} (ref 0.8–1.7)
ALP SERPL-CCNC: 79 U/L (ref 45–117)
ALT SERPL-CCNC: 106 U/L (ref 13–56)
ANION GAP SERPL CALC-SCNC: 6 MMOL/L (ref 3–18)
APTT PPP: 64.8 SEC (ref 23–36.4)
APTT PPP: 97.2 SEC (ref 23–36.4)
AST SERPL-CCNC: 471 U/L (ref 10–38)
BASOPHILS # BLD: 0 K/UL (ref 0–0.1)
BASOPHILS NFR BLD: 0 % (ref 0–2)
BILIRUB DIRECT SERPL-MCNC: 0.2 MG/DL (ref 0–0.2)
BILIRUB SERPL-MCNC: 0.4 MG/DL (ref 0.2–1)
BUN SERPL-MCNC: 50 MG/DL (ref 7–18)
BUN/CREAT SERPL: 17 (ref 12–20)
CALCIUM SERPL-MCNC: 8.6 MG/DL (ref 8.5–10.1)
CHLORIDE SERPL-SCNC: 111 MMOL/L (ref 100–111)
CK SERPL-CCNC: 1711 U/L (ref 26–192)
CO2 SERPL-SCNC: 26 MMOL/L (ref 21–32)
CREAT SERPL-MCNC: 2.86 MG/DL (ref 0.6–1.3)
DIFFERENTIAL METHOD BLD: ABNORMAL
EOSINOPHIL # BLD: 0 K/UL (ref 0–0.4)
EOSINOPHIL NFR BLD: 0 % (ref 0–5)
ERYTHROCYTE [DISTWIDTH] IN BLOOD BY AUTOMATED COUNT: 16.1 % (ref 11.6–14.5)
GLOBULIN SER CALC-MCNC: 3.5 G/DL (ref 2–4)
GLUCOSE SERPL-MCNC: 93 MG/DL (ref 74–99)
HCT VFR BLD AUTO: 36.5 % (ref 35–45)
HGB BLD-MCNC: 10.7 G/DL (ref 12–16)
IMM GRANULOCYTES # BLD AUTO: 0 K/UL (ref 0–0.04)
IMM GRANULOCYTES NFR BLD AUTO: 0 % (ref 0–0.5)
LYMPHOCYTES # BLD: 0.8 K/UL (ref 0.9–3.6)
LYMPHOCYTES NFR BLD: 11 % (ref 21–52)
MCH RBC QN AUTO: 27.9 PG (ref 24–34)
MCHC RBC AUTO-ENTMCNC: 29.3 G/DL (ref 31–37)
MCV RBC AUTO: 95.1 FL (ref 78–100)
MONOCYTES # BLD: 0.6 K/UL (ref 0.05–1.2)
MONOCYTES NFR BLD: 8 % (ref 3–10)
NEUTS SEG # BLD: 5.5 K/UL (ref 1.8–8)
NEUTS SEG NFR BLD: 80 % (ref 40–73)
NRBC # BLD: 0 K/UL (ref 0–0.01)
NRBC BLD-RTO: 0 PER 100 WBC
PLATELET # BLD AUTO: 193 K/UL (ref 135–420)
PMV BLD AUTO: 11.8 FL (ref 9.2–11.8)
POTASSIUM SERPL-SCNC: 4.1 MMOL/L (ref 3.5–5.5)
PROT SERPL-MCNC: 6.6 G/DL (ref 6.4–8.2)
RBC # BLD AUTO: 3.84 M/UL (ref 4.2–5.3)
SODIUM SERPL-SCNC: 143 MMOL/L (ref 136–145)
WBC # BLD AUTO: 6.9 K/UL (ref 4.6–13.2)

## 2022-05-21 PROCEDURE — 99232 SBSQ HOSP IP/OBS MODERATE 35: CPT | Performed by: INTERNAL MEDICINE

## 2022-05-21 PROCEDURE — 80076 HEPATIC FUNCTION PANEL: CPT

## 2022-05-21 PROCEDURE — 74011250636 HC RX REV CODE- 250/636: Performed by: HOSPITALIST

## 2022-05-21 PROCEDURE — 77010033678 HC OXYGEN DAILY

## 2022-05-21 PROCEDURE — 82550 ASSAY OF CK (CPK): CPT

## 2022-05-21 PROCEDURE — 74011000258 HC RX REV CODE- 258: Performed by: HOSPITALIST

## 2022-05-21 PROCEDURE — 85730 THROMBOPLASTIN TIME PARTIAL: CPT

## 2022-05-21 PROCEDURE — 74011250637 HC RX REV CODE- 250/637: Performed by: HOSPITALIST

## 2022-05-21 PROCEDURE — 74011000250 HC RX REV CODE- 250: Performed by: HOSPITALIST

## 2022-05-21 PROCEDURE — 80048 BASIC METABOLIC PNL TOTAL CA: CPT

## 2022-05-21 PROCEDURE — 36415 COLL VENOUS BLD VENIPUNCTURE: CPT

## 2022-05-21 PROCEDURE — 74011250636 HC RX REV CODE- 250/636: Performed by: EMERGENCY MEDICINE

## 2022-05-21 PROCEDURE — 65270000046 HC RM TELEMETRY

## 2022-05-21 PROCEDURE — 93005 ELECTROCARDIOGRAM TRACING: CPT

## 2022-05-21 PROCEDURE — 85025 COMPLETE CBC W/AUTO DIFF WBC: CPT

## 2022-05-21 RX ORDER — ATORVASTATIN CALCIUM 20 MG/1
40 TABLET, FILM COATED ORAL DAILY
Status: DISCONTINUED | OUTPATIENT
Start: 2022-05-21 | End: 2022-05-26 | Stop reason: HOSPADM

## 2022-05-21 RX ADMIN — ALLOPURINOL 100 MG: 100 TABLET ORAL at 08:14

## 2022-05-21 RX ADMIN — ATORVASTATIN CALCIUM 40 MG: 20 TABLET, FILM COATED ORAL at 21:34

## 2022-05-21 RX ADMIN — FUROSEMIDE 20 MG: 10 INJECTION, SOLUTION INTRAMUSCULAR; INTRAVENOUS at 08:14

## 2022-05-21 RX ADMIN — FAMOTIDINE 20 MG: 10 INJECTION INTRAVENOUS at 08:14

## 2022-05-21 RX ADMIN — CEFTRIAXONE 1 G: 1 INJECTION, POWDER, FOR SOLUTION INTRAMUSCULAR; INTRAVENOUS at 16:05

## 2022-05-21 RX ADMIN — NITROGLYCERIN 1 INCH: 20 OINTMENT TOPICAL at 21:34

## 2022-05-21 RX ADMIN — CARVEDILOL 6.25 MG: 3.12 TABLET, FILM COATED ORAL at 08:14

## 2022-05-21 RX ADMIN — HEPARIN SODIUM 13.2 UNITS/KG/HR: 5000 INJECTION INTRAVENOUS; SUBCUTANEOUS at 13:39

## 2022-05-21 RX ADMIN — CARVEDILOL 6.25 MG: 3.12 TABLET, FILM COATED ORAL at 16:06

## 2022-05-21 RX ADMIN — HYDRALAZINE HYDROCHLORIDE 10 MG: 10 TABLET, FILM COATED ORAL at 16:06

## 2022-05-21 RX ADMIN — FAMOTIDINE 20 MG: 10 INJECTION INTRAVENOUS at 21:35

## 2022-05-21 RX ADMIN — NITROGLYCERIN 1 INCH: 20 OINTMENT TOPICAL at 08:14

## 2022-05-21 RX ADMIN — ASPIRIN 81 MG: 81 TABLET, CHEWABLE ORAL at 08:14

## 2022-05-21 RX ADMIN — HYDRALAZINE HYDROCHLORIDE 10 MG: 10 TABLET, FILM COATED ORAL at 08:14

## 2022-05-21 RX ADMIN — ACETAMINOPHEN 650 MG: 325 TABLET ORAL at 16:06

## 2022-05-21 RX ADMIN — HYDRALAZINE HYDROCHLORIDE 10 MG: 10 TABLET, FILM COATED ORAL at 21:34

## 2022-05-21 NOTE — PROGRESS NOTES
Reason for Admission:   NSTEMI (non-ST elevated myocardial infarction) ([facility] [I21.4]               RUR Score:     Low,  13%             Resources/supports as identified by patient/family:       PCP: First and Last name:  Angela Gonzalez MD - verified, and saw last week    Top Challenges facing patient (as identified by patient/family and CM): Finances/Medication cost?     [employer]      daughter  Support system or lack thereof?  2 daughters  Living arrangements? Daughter Ailyn Mena lives with patient  Self-care/ADLs/Cognition? Alert and oriented       Current Advanced Directive/Advance Care Plan:   Full code, no ACP    Healthcare Decision Maker:     Click here to complete [address] including selection of the Healthcare Decision Maker Relationship (ie \"Primary\")                          Plan for utilizing home health:    TBD                        Anticipated Discharge Plan:                    Initial assessment completed with patient. Cognitive status of patient: alert and oriented. Face sheet information confirmed:  Yes. The patient designatesKiarra to participate in her discharge plan and to receive any needed information. This patient lives in a home with daughter. Patient is} able to navigate steps as needed. Prior to hospitalization, patient was considered to be independent with ADLs/IADLS : yes . Patient has a current ACP document on file: no  The daughter will be available to transport patient home upon discharge. The patient already has home O2 with [facility] equipment available in the home. Patient is not currently active with home health. Currently, the discharge plan is pending improvement in renal function and cardiac catheterization for further treatment decisions. The patient states that she can obtain her medications from the pharmacy, and take her medications as directed.         Care Management Interventions  Support Systems: Child(cuco)  Discharge Location  Patient Expects to be Discharged to[de-identified] Home

## 2022-05-21 NOTE — PROGRESS NOTES
Bedside and Verbal shift change report given to JEFRY Metz(oncoming nurse) by Darrin Salinas (offgoing nurse).  Report included the following information SBAR, Kardex, Intake/Output, MAR, Med Rec Status and Cardiac Rhythm SR.

## 2022-05-21 NOTE — PROGRESS NOTES
Spoke to [place] in lab who stated at 605am have received PTT just not resulted yet. Awaiting on results in reference to monitoring Heparin gtt per md orders.  All disciplines made aware

## 2022-05-21 NOTE — PROGRESS NOTES
Cardiology Progress Note      5/21/2022 12:13 PM    Admit Date: 5/20/2022    Admit Diagnosis: NSTEMI (non-ST elevated myocardial infarction) ([facility] [I21.4]      Subjective: Hany Plaza has Multiple issues. .    Visit Vitals  /77   Pulse 81   Temp 98 °F (36.7 °C)   Resp 16   Ht 5' 7\" (1.702 m)   Wt 88.5 kg (195 lb)   SpO2 98%   BMI 30.54 kg/m²     Current Facility-Administered Medications   Medication Dose Route Frequency    heparin 25,000 units in  mL infusion  11.2-25 Units/kg/hr IntraVENous TITRATE    aspirin chewable tablet 81 mg  81 mg Oral DAILY    furosemide (LASIX) injection 20 mg  20 mg IntraVENous DAILY    allopurinoL (ZYLOPRIM) tablet 100 mg  100 mg Oral DAILY    hydrALAZINE (APRESOLINE) tablet 10 mg  10 mg Oral TID    nitroglycerin (NITROBID) 2 % ointment 1 Inch  1 Inch Topical BID    carvediloL (COREG) tablet 6.25 mg  6.25 mg Oral BID WITH MEALS    acetaminophen (TYLENOL) tablet 650 mg  650 mg Oral Q6H PRN    morphine injection 2 mg  2 mg IntraVENous Q3H PRN    ondansetron (ZOFRAN) injection 4 mg  4 mg IntraVENous Q6H PRN    diphenhydrAMINE (BENADRYL) injection 12.5 mg  12.5 mg IntraVENous Q6H PRN    famotidine (PF) (PEPCID) 20 mg in 0.9% sodium chloride 10 mL injection  20 mg IntraVENous Q12H    cefTRIAXone (ROCEPHIN) 1 g in 0.9% sodium chloride (MBP/ADV) 50 mL MBP  1 g IntraVENous Q24H         Objective:      Physical Exam:  Physical Exam  Vitals and nursing note reviewed. Constitutional:       General: She is not in acute distress. Appearance: She is well-developed. She is not ill-appearing or toxic-appearing. HENT:      Head: Normocephalic and atraumatic. Neck:      Thyroid: No thyromegaly. Vascular: No hepatojugular reflux or JVD. Cardiovascular:      Rate and Rhythm: Normal rate and regular rhythm. Extrasystoles are present. Pulses:           Carotid pulses are 2+ on the right side and 2+ on the left side.        Radial pulses are 2+ on the right side and 2+ on the left side. Dorsalis pedis pulses are 2+ on the right side and 2+ on the left side. Posterior tibial pulses are 2+ on the right side and 2+ on the left side. Heart sounds: Heart sounds not distant. Murmur heard. Systolic murmur is present with a grade of 2/6. No diastolic murmur is present. No friction rub. No gallop. No S3 or S4 sounds. Pulmonary:      Effort: Pulmonary effort is normal. No tachypnea, accessory muscle usage or respiratory distress. Breath sounds: Normal breath sounds. No stridor. Chest:      Chest wall: No mass or tenderness. Abdominal:      General: Bowel sounds are normal.      Palpations: Abdomen is soft. There is no fluid wave or splenomegaly. Musculoskeletal:         General: Normal range of motion. Cervical back: Normal range of motion and neck supple. Right lower leg: No edema. Left lower leg: No edema. Skin:     General: Skin is warm and dry. Capillary Refill: Capillary refill takes less than 2 seconds. Neurological:      General: No focal deficit present. Mental Status: She is alert and oriented to person, place, and time.    Psychiatric:         Mood and Affect: Mood normal.         Behavior: Behavior normal.     Data Review:   Labs:    Recent Results (from the past 24 hour(s))   TROPONIN-HIGH SENSITIVITY    Collection Time: 05/20/22 12:42 PM   Result Value Ref Range    Troponin-High Sensitivity >125,000 (HH) 0 - 54 ng/L   CK    Collection Time: 05/20/22 12:50 PM   Result Value Ref Range    CK 2,763 (H) 26 - 192 U/L   LIPASE    Collection Time: 05/20/22 12:50 PM   Result Value Ref Range    Lipase 80 73 - 493 U/L   METABOLIC PANEL, COMPREHENSIVE    Collection Time: 05/20/22 12:50 PM   Result Value Ref Range    Sodium 144 136 - 145 mmol/L    Potassium 3.4 (L) 3.5 - 5.5 mmol/L    Chloride 109 100 - 111 mmol/L    CO2 27 21 - 32 mmol/L    Anion gap 8 3.0 - 18 mmol/L    Glucose 105 (H) 74 - 99 mg/dL    BUN 46 (H) 7.0 - 18 MG/DL    Creatinine 2.70 (H) 0.6 - 1.3 MG/DL    BUN/Creatinine ratio 17 12 - 20      GFR est AA 21 (L) >60 ml/min/1.73m2    GFR est non-AA 18 (L) >60 ml/min/1.73m2    Calcium 9.4 8.5 - 10.1 MG/DL    Bilirubin, total 0.6 0.2 - 1.0 MG/DL    ALT (SGPT) 83 (H) 13 - 56 U/L    AST (SGOT) 537 (H) 10 - 38 U/L    Alk.  phosphatase 87 45 - 117 U/L    Protein, total 6.5 6.4 - 8.2 g/dL    Albumin 3.3 (L) 3.4 - 5.0 g/dL    Globulin 3.2 2.0 - 4.0 g/dL    A-G Ratio 1.0 0.8 - 1.7     NT-PRO BNP    Collection Time: 05/20/22 12:50 PM   Result Value Ref Range    NT pro-BNP 14,410 (H) 0 - 900 PG/ML   EKG, 12 LEAD, SUBSEQUENT    Collection Time: 05/20/22  1:34 PM   Result Value Ref Range    Ventricular Rate 81 BPM    Atrial Rate 81 BPM    P-R Interval 170 ms    QRS Duration 132 ms    Q-T Interval 436 ms    QTC Calculation (Bezet) 506 ms    Calculated P Axis 43 degrees    Calculated R Axis -33 degrees    Calculated T Axis 123 degrees    Diagnosis       Normal sinus rhythm  Possible Left atrial enlargement  Left axis deviation  Left ventricular hypertrophy with QRS widening and repolarization abnormality   ( R in aVL , [place] product )  Cannot rule out Septal infarct (cited on or before 13-JAN-2022)  Abnormal ECG  When compared with ECG of 20-MAY-2022 11:42,  premature atrial complexes are no longer present  Confirmed by James Deleon MD, -- ([id number]) on 5/20/2022 6:05:42 PM     TROPONIN-HIGH SENSITIVITY    Collection Time: 05/20/22  2:00 PM   Result Value Ref Range    Troponin-High Sensitivity >125,000 (HH) 0 - 54 ng/L   ECHO ADULT COMPLETE    Collection Time: 05/20/22  4:50 PM   Result Value Ref Range    IVSd 1.9 (A) 0.6 - 0.9 cm    LVIDd 5.1 3.9 - 5.3 cm    LVIDs 4.4 cm    LVOT Diameter 2.1 cm    LVPWd 1.7 (A) 0.6 - 0.9 cm    LVOT Cardiac Output 0.0 liter/minute    EF BP 19 (A) 55 - 100 %    LV Ejection Fraction A2C 20 %    LV Ejection Fraction A4C 10 %    LV EDV A2C 162 mL    LV EDV A4C 106 mL    LV EDV  (A) 56 - 104 mL LV ESV A2C 130 mL    LV ESV A4C 95 mL    LV ESV  (A) 19 - 49 mL    LVOT Peak Gradient 1 mmHg    LVOT Mean Gradient 0 mmHg    LVOT SV 29.4 ml    LVOT Peak Velocity 0.5 m/s    LVOT VTI 8.5 cm    RVIDd 4.1 cm    RV Free Wall Peak S' 9 cm/s    LA Diameter 6.2 cm    LA Volume A/L 118 mL    LA Volume 2C 102 (A) 22 - 52 mL    LA Volume 4C 119 (A) 22 - 52 mL    LA Volume  (A) 22 - 52 mL    AV Area by Peak Velocity 1.8 cm2    AV Area by VTI 1.9 cm2    AV Peak Gradient 3 mmHg    AV Mean Gradient 2 mmHg    AV Peak Velocity 0.8 m/s    AV Mean Velocity 0.6 m/s    AV VTI 15.1 cm    MV A Velocity 0.39 m/s    MV E Wave Deceleration Time 100.0 ms    MV E Velocity 0.74 m/s    LV E' Lateral Velocity 5 cm/s    LV E' Septal Velocity 5 cm/s    MV Area by VTI 1.6 cm2    MV Peak Gradient 3 mmHg    MV Mean Gradient 1 mmHg    MV Max Velocity 0.9 m/s    MV Mean Velocity 0.5 m/s    MV VTI 17.9 cm    Pulmonary Artery EDP 8 mmHg    IL Max Velocity 1.5 m/s    TAPSE 1.1 (A) 1.7 cm    TR Peak Gradient 45 mmHg    TR Max Velocity 3.35 m/s    Ascending Aorta 3.7 cm    Aortic Root 3.6 cm    Fractional Shortening 2D 14 28 - 44 %    LV ESV Index BP 53 mL/m2    LV EDV Index BP 66 mL/m2    LV ESV Index A4C 48 mL/m2    LV EDV Index A4C 53 mL/m2    LV ESV Index A2C 65 mL/m2    LV EDV Index A2C 81 mL/m2    LVIDd Index 2.55 cm/m2    LVIDs Index 2.20 cm/m2    LV RWT Ratio 0.67     LV Mass 2D 438.1 (A) 67 - 162 g    LV Mass 2D Index 219.1 (A) 43 - 95 g/m2    MV E/A 1.90     E/E' Ratio (Averaged) 14.80     E/E' Lateral 14.80     E/E' Septal 14.80     LA Volume Index BP 57 (A) 16 - 34 ml/m2    LA Volume Index A/L 59 16 - 34 mL/m2    LVOT Stroke Volume Index 14.7 mL/m2    LVOT Area 3.5 cm2    LA Volume Index 2C 51 (A) 16 - 34 mL/m2    LA Volume Index 4C 60 (A) 16 - 34 mL/m2    LA Size Index 3.10 cm/m2    LA/AO Root Ratio 1.72     Ao Root Index 1.80 cm/m2    Ascending Aorta Index 1.85 cm/m2    AV Velocity Ratio 0.63     LVOT:AV VTI Index 0.56     GIOVANA/BSA VTI 1.0 cm2/m2    GIOVANA/BSA Peak Velocity 0.9 cm2/m2    MV:LVOT VTI Index 2.11     Est. RA Pressure 15 mmHg    RVSP 60 mmHg   PTT    Collection Time: 05/20/22  9:40 PM   Result Value Ref Range    aPTT 53.4 (H) 23.0 - 36.4 SEC   CBC WITH AUTOMATED DIFF    Collection Time: 05/21/22  5:42 AM   Result Value Ref Range    WBC 6.9 4.6 - 13.2 K/uL    RBC 3.84 (L) 4.20 - 5.30 M/uL    HGB 10.7 (L) 12.0 - 16.0 g/dL    HCT 36.5 35.0 - 45.0 %    MCV 95.1 78.0 - 100.0 FL    MCH 27.9 24.0 - 34.0 PG    MCHC 29.3 (L) 31.0 - 37.0 g/dL    RDW 16.1 (H) 11.6 - 14.5 %    PLATELET 275 403 - 337 K/uL    MPV 11.8 9.2 - 11.8 FL    NRBC 0.0 0  WBC    ABSOLUTE NRBC 0.00 0.00 - 0.01 K/uL    NEUTROPHILS 80 (H) 40 - 73 %    LYMPHOCYTES 11 (L) 21 - 52 %    MONOCYTES 8 3 - 10 %    EOSINOPHILS 0 0 - 5 %    BASOPHILS 0 0 - 2 %    IMMATURE GRANULOCYTES 0 0.0 - 0.5 %    ABS. NEUTROPHILS 5.5 1.8 - 8.0 K/UL    ABS. LYMPHOCYTES 0.8 (L) 0.9 - 3.6 K/UL    ABS. MONOCYTES 0.6 0.05 - 1.2 K/UL    ABS. EOSINOPHILS 0.0 0.0 - 0.4 K/UL    ABS. BASOPHILS 0.0 0.0 - 0.1 K/UL    ABS. IMM.  GRANS. 0.0 0.00 - 0.04 K/UL    DF AUTOMATED     METABOLIC PANEL, BASIC    Collection Time: 05/21/22  5:42 AM   Result Value Ref Range    Sodium 143 136 - 145 mmol/L    Potassium 4.1 3.5 - 5.5 mmol/L    Chloride 111 100 - 111 mmol/L    CO2 26 21 - 32 mmol/L    Anion gap 6 3.0 - 18 mmol/L    Glucose 93 74 - 99 mg/dL    BUN 50 (H) 7.0 - 18 MG/DL    Creatinine 2.86 (H) 0.6 - 1.3 MG/DL    BUN/Creatinine ratio 17 12 - 20      GFR est AA 20 (L) >60 ml/min/1.73m2    GFR est non-AA 17 (L) >60 ml/min/1.73m2    Calcium 8.6 8.5 - 10.1 MG/DL   PTT    Collection Time: 05/21/22  5:42 AM   Result Value Ref Range    aPTT 97.2 (H) 23.0 - 36.4 SEC   CK    Collection Time: 05/21/22  5:42 AM   Result Value Ref Range    CK 1,711 (H) 26 - 192 U/L   HEPATIC FUNCTION PANEL    Collection Time: 05/21/22  5:42 AM   Result Value Ref Range    Protein, total 6.6 6.4 - 8.2 g/dL    Albumin 3.1 (L) 3.4 - 5.0 g/dL Globulin 3.5 2.0 - 4.0 g/dL    A-G Ratio 0.9 0.8 - 1.7      Bilirubin, total 0.4 0.2 - 1.0 MG/DL    Bilirubin, direct 0.2 0.0 - 0.2 MG/DL    Alk. phosphatase 79 45 - 117 U/L    AST (SGOT) 471 (H) 10 - 38 U/L    ALT (SGPT) 106 (H) 13 - 56 U/L       Telemetry: Sinus      Assessment:     Principal Problem:    NSTEMI (non-ST elevated myocardial infarction) ([facility] (5/20/2022)    Active Problems:    Atherosclerosis of coronary artery (11/6/2013)      Essential hypertension (11/6/2013)      Mild intermittent asthma without complication (2/39/3441)      S/P bilateral mastectomy (5/2/2014)      Stage 4 chronic kidney disease ([facility].) (75/93/6122)      Systolic CHF, chronic ([facility].) (5/20/2022)        Plan:     Creatinine is higher today than yesterday despite some IV fluids. We will obtain nephrology consult. She is chest pain-free today. Does have some shortness of breath. We will try to diurese him at this point. Nephrology input will be helpful.     lAee Ortiz MD

## 2022-05-21 NOTE — PROGRESS NOTES
Problem: Falls - Risk of  Goal: *Absence of Falls  Description: Document Ayla Larkin Fall Risk and appropriate interventions in the flowsheet.   Outcome: Progressing Towards Goal  Note: Fall Risk Interventions:  Mobility Interventions: Bed/chair exit alarm,Mechanical lift,Patient to call before getting OOB,PT Consult for mobility concerns,Strengthening exercises (ROM-active/passive),Utilize walker, cane, or other assistive device         Medication Interventions: Bed/chair exit alarm,Patient to call before getting OOB,Teach patient to arise slowly    Elimination Interventions: Bed/chair exit alarm,Call light in reach,Elevated toilet seat,Patient to call for help with toileting needs,Stay With Me (per policy),Toileting schedule/hourly rounds    History of Falls Interventions: Bed/chair exit alarm,Vital signs minimum Q4HRs X 24 hrs (comment for end date)         Problem: Patient Education: Go to Patient Education Activity  Goal: Patient/Family Education  Outcome: Progressing Towards Goal

## 2022-05-21 NOTE — PROGRESS NOTES
Hospitalist Progress Note    Patient: Suzan Valderrama MRN: [id number]  CSN: [id number]    YOB: 1955  Age: 74 y.o. Sex: female    DOA: 5/20/2022 LOS:  LOS: 1 day                Assessment/Plan     Patient Active Problem List   Diagnosis Code    Atherosclerosis of coronary artery I25.10    Essential hypertension I10    Malignant neoplasm of female breast ([facility] C50.919    Mild intermittent asthma without complication W82.81    Moderate malnutrition (HCC) E44.0    S/P bilateral mastectomy Z90.13    Stage 4 chronic kidney disease (HCC) N18.4    NSTEMI (non-ST elevated myocardial infarction) ([facility] W86.0    Systolic CHF, chronic (HCC) I50.22        Chief complaint :  Chest pain  81-year-old female with extensive medical history, congestive heart failure, chronic kidney disease, coronary disease with stents, hypertension presents to ER with concerns of chest pain. NSTEMI -  On heparin drip  On aspirin, lipitor, coreg, nitro paste. Cardiology following. HTN -  Controlled  Continue with hydralazine. CKD stage 4 -  Monitor renal function  Nephrology consulted. History of gout  Continue with allopurinol. Disposition : TBD    Review of systems  General: No fevers or chills. Cardiovascular: see above. Pulmonary: No shortness of breath. Gastrointestinal: No nausea, vomiting. Physical Exam:  General: Awake, cooperative, no acute distress    HEENT: NC, Atraumatic. PERRLA, anicteric sclerae. Lungs: CTA Bilaterally. No Wheezing/Rhonchi/Rales. Heart:  S1 S2,  No murmur, No Rubs, No Gallops  Abdomen: Soft, Non distended, Non tender.  +Bowel sounds,   Extremities: No c/c/e  Psych:   Not anxious or agitated. Neurologic:  No acute neurological deficit.            Vital signs/Intake and Output:  Visit Vitals  /72   Pulse 79   Temp 98 °F (36.7 °C)   Resp 18   Ht 5' 7\" (1.702 m)   Wt 88.5 kg (195 lb)   SpO2 98%   BMI 30.54 kg/m²     Current Shift:  No intake/output data recorded. Last three shifts:  No intake/output data recorded. Labs: Results:       Chemistry Recent Labs     05/21/22  0542 05/20/22  1250   GLU 93 105*    144   K 4.1 3.4*    109   CO2 26 27   BUN 50* 46*   CREA 2.86* 2.70*   CA 8.6 9.4   AGAP 6 8   BUCR 17 17   AP 79 87   TP 6.6 6.5   ALB 3.1* 3.3*   GLOB 3.5 3.2   AGRAT 0.9 1.0      CBC w/Diff Recent Labs     05/21/22  0542 05/20/22  1145   WBC 6.9 6.2   RBC 3.84* 3.95*   HGB 10.7* 11.1*   HCT 36.5 36.4    192   GRANS 80* 79*   LYMPH 11* 12*   EOS 0 1      Cardiac Enzymes Recent Labs     05/21/22  0542 05/20/22  1250   CPK 1,711* 2,763*      Coagulation Recent Labs     05/21/22  1348 05/21/22  0542 05/20/22  2140 05/20/22  1145   PTP  --   --   --  14.7   INR  --   --   --  1.1   APTT 64.8* 97.2*   < > 30.7    < > = values in this interval not displayed. Lipid Panel No results found for: CHOL, CHOLPOCT, CHOLX, CHLST, CHOLV, 661719, HDL, HDLP, LDL, LDLC, DLDLP, 627452, VLDLC, VLDL, TGLX, TRIGL, TRIGP, TGLPOCT, CHHD, CHHDX   BNP No results for input(s): BNPP in the last 72 hours.    Liver Enzymes Recent Labs     05/21/22  0542   TP 6.6   ALB 3.1*   AP 79      Thyroid Studies No results found for: T4, T3U, TSH, TSHEXT     Procedures/imaging: see electronic medical records for all procedures/Xrays and details which were not copied into this note but were reviewed prior to creation of Plan

## 2022-05-21 NOTE — PROGRESS NOTES
Bedside and Verbal shift change report given to Xavier Jain RN   (oncoming nurse) by Gia Clay  (offgoing nurse). Report included the following information SBAR, Intake/Output and MAR. Pt awake during report, no reports of pain. NPO for ?  Cath this am

## 2022-05-21 NOTE — PROGRESS NOTES
Spoke to lab/Rhea who stated no  at this time to draw PTT per MD orders. Noted under lab, PTT collected 5/20/22 at 2000. Clarified with  who stated no PTT for patient in lab. PTT drawn by nurse in reference to Heparin gtt per MD orders. Will continue to monitor patient . All disciplines made aware.

## 2022-05-21 NOTE — PROGRESS NOTES
V/o from Dr Master Lemon is not having Cath today d/t renal function   Pt may eat . DIET orders placed per Dr Chely Tran   Pt notified

## 2022-05-22 LAB
ANION GAP SERPL CALC-SCNC: 6 MMOL/L (ref 3–18)
APTT PPP: 62.5 SEC (ref 23–36.4)
APTT PPP: 71.8 SEC (ref 23–36.4)
APTT PPP: 79.2 SEC (ref 23–36.4)
BASOPHILS # BLD: 0 K/UL (ref 0–0.1)
BASOPHILS NFR BLD: 0 % (ref 0–2)
BUN SERPL-MCNC: 60 MG/DL (ref 7–18)
BUN/CREAT SERPL: 17 (ref 12–20)
CALCIUM SERPL-MCNC: 8.4 MG/DL (ref 8.5–10.1)
CHLORIDE SERPL-SCNC: 106 MMOL/L (ref 100–111)
CO2 SERPL-SCNC: 26 MMOL/L (ref 21–32)
CREAT SERPL-MCNC: 3.44 MG/DL (ref 0.6–1.3)
DIFFERENTIAL METHOD BLD: ABNORMAL
EOSINOPHIL # BLD: 0.1 K/UL (ref 0–0.4)
EOSINOPHIL NFR BLD: 1 % (ref 0–5)
ERYTHROCYTE [DISTWIDTH] IN BLOOD BY AUTOMATED COUNT: 15.8 % (ref 11.6–14.5)
GLUCOSE SERPL-MCNC: 106 MG/DL (ref 74–99)
HCT VFR BLD AUTO: 34.4 % (ref 35–45)
HGB BLD-MCNC: 10.2 G/DL (ref 12–16)
IMM GRANULOCYTES # BLD AUTO: 0 K/UL (ref 0–0.04)
IMM GRANULOCYTES NFR BLD AUTO: 1 % (ref 0–0.5)
LYMPHOCYTES # BLD: 0.8 K/UL (ref 0.9–3.6)
LYMPHOCYTES NFR BLD: 14 % (ref 21–52)
MCH RBC QN AUTO: 27.9 PG (ref 24–34)
MCHC RBC AUTO-ENTMCNC: 29.7 G/DL (ref 31–37)
MCV RBC AUTO: 94 FL (ref 78–100)
MONOCYTES # BLD: 0.6 K/UL (ref 0.05–1.2)
MONOCYTES NFR BLD: 9 % (ref 3–10)
NEUTS SEG # BLD: 4.5 K/UL (ref 1.8–8)
NEUTS SEG NFR BLD: 75 % (ref 40–73)
NRBC # BLD: 0.04 K/UL (ref 0–0.01)
NRBC BLD-RTO: 0.7 PER 100 WBC
PLATELET # BLD AUTO: 170 K/UL (ref 135–420)
PMV BLD AUTO: 12.3 FL (ref 9.2–11.8)
POTASSIUM SERPL-SCNC: 4.1 MMOL/L (ref 3.5–5.5)
RBC # BLD AUTO: 3.66 M/UL (ref 4.2–5.3)
SODIUM SERPL-SCNC: 138 MMOL/L (ref 136–145)
WBC # BLD AUTO: 6 K/UL (ref 4.6–13.2)

## 2022-05-22 PROCEDURE — 80048 BASIC METABOLIC PNL TOTAL CA: CPT

## 2022-05-22 PROCEDURE — 74011250636 HC RX REV CODE- 250/636: Performed by: EMERGENCY MEDICINE

## 2022-05-22 PROCEDURE — 85730 THROMBOPLASTIN TIME PARTIAL: CPT

## 2022-05-22 PROCEDURE — 74011250636 HC RX REV CODE- 250/636: Performed by: INTERNAL MEDICINE

## 2022-05-22 PROCEDURE — 74011000250 HC RX REV CODE- 250: Performed by: INTERNAL MEDICINE

## 2022-05-22 PROCEDURE — 85025 COMPLETE CBC W/AUTO DIFF WBC: CPT

## 2022-05-22 PROCEDURE — 65610000006 HC RM INTENSIVE CARE

## 2022-05-22 PROCEDURE — 77010033678 HC OXYGEN DAILY

## 2022-05-22 PROCEDURE — 74011250637 HC RX REV CODE- 250/637: Performed by: INTERNAL MEDICINE

## 2022-05-22 PROCEDURE — 74011250637 HC RX REV CODE- 250/637: Performed by: HOSPITALIST

## 2022-05-22 PROCEDURE — 74011250636 HC RX REV CODE- 250/636: Performed by: HOSPITALIST

## 2022-05-22 PROCEDURE — 36415 COLL VENOUS BLD VENIPUNCTURE: CPT

## 2022-05-22 PROCEDURE — 94640 AIRWAY INHALATION TREATMENT: CPT

## 2022-05-22 PROCEDURE — 74011000250 HC RX REV CODE- 250: Performed by: HOSPITALIST

## 2022-05-22 PROCEDURE — 74011000258 HC RX REV CODE- 258: Performed by: HOSPITALIST

## 2022-05-22 RX ORDER — HEPARIN SODIUM 1000 [USP'U]/ML
3000 INJECTION, SOLUTION INTRAVENOUS; SUBCUTANEOUS ONCE
Status: COMPLETED | OUTPATIENT
Start: 2022-05-22 | End: 2022-05-22

## 2022-05-22 RX ORDER — IPRATROPIUM BROMIDE AND ALBUTEROL SULFATE 2.5; .5 MG/3ML; MG/3ML
3 SOLUTION RESPIRATORY (INHALATION)
Status: DISCONTINUED | OUTPATIENT
Start: 2022-05-22 | End: 2022-05-26 | Stop reason: HOSPADM

## 2022-05-22 RX ORDER — FUROSEMIDE 10 MG/ML
80 INJECTION INTRAMUSCULAR; INTRAVENOUS ONCE
Status: COMPLETED | OUTPATIENT
Start: 2022-05-22 | End: 2022-05-22

## 2022-05-22 RX ORDER — ARFORMOTEROL TARTRATE 15 UG/2ML
15 SOLUTION RESPIRATORY (INHALATION)
Status: DISCONTINUED | OUTPATIENT
Start: 2022-05-22 | End: 2022-05-23

## 2022-05-22 RX ORDER — AMOXICILLIN 250 MG
1 CAPSULE ORAL DAILY
Status: DISCONTINUED | OUTPATIENT
Start: 2022-05-22 | End: 2022-05-26 | Stop reason: HOSPADM

## 2022-05-22 RX ORDER — BUDESONIDE 0.5 MG/2ML
500 INHALANT ORAL
Status: DISCONTINUED | OUTPATIENT
Start: 2022-05-22 | End: 2022-05-26 | Stop reason: HOSPADM

## 2022-05-22 RX ADMIN — ACETAMINOPHEN 650 MG: 325 TABLET ORAL at 06:52

## 2022-05-22 RX ADMIN — HEPARIN SODIUM 17.51 UNITS/KG/HR: 5000 INJECTION INTRAVENOUS; SUBCUTANEOUS at 10:55

## 2022-05-22 RX ADMIN — Medication 1 TABLET: at 09:28

## 2022-05-22 RX ADMIN — CARVEDILOL 6.25 MG: 3.12 TABLET, FILM COATED ORAL at 17:26

## 2022-05-22 RX ADMIN — ARFORMOTEROL TARTRATE 15 MCG: 15 SOLUTION RESPIRATORY (INHALATION) at 20:12

## 2022-05-22 RX ADMIN — CARVEDILOL 6.25 MG: 3.12 TABLET, FILM COATED ORAL at 09:28

## 2022-05-22 RX ADMIN — ASPIRIN 81 MG: 81 TABLET, CHEWABLE ORAL at 09:28

## 2022-05-22 RX ADMIN — HEPARIN SODIUM 16.35 UNITS/KG/HR: 5000 INJECTION INTRAVENOUS; SUBCUTANEOUS at 07:23

## 2022-05-22 RX ADMIN — ALUMINUM HYDROXIDE, MAGNESIUM HYDROXIDE, AND SIMETHICONE 40 ML: 200; 200; 20 SUSPENSION ORAL at 00:41

## 2022-05-22 RX ADMIN — FAMOTIDINE 20 MG: 10 INJECTION INTRAVENOUS at 09:28

## 2022-05-22 RX ADMIN — NITROGLYCERIN 1 INCH: 20 OINTMENT TOPICAL at 09:28

## 2022-05-22 RX ADMIN — ACETAMINOPHEN 650 MG: 325 TABLET ORAL at 17:41

## 2022-05-22 RX ADMIN — NITROGLYCERIN 1 INCH: 20 OINTMENT TOPICAL at 22:36

## 2022-05-22 RX ADMIN — ALLOPURINOL 100 MG: 100 TABLET ORAL at 09:28

## 2022-05-22 RX ADMIN — HEPARIN SODIUM 3000 UNITS: 1000 INJECTION INTRAVENOUS; SUBCUTANEOUS at 03:13

## 2022-05-22 RX ADMIN — CEFTRIAXONE 1 G: 1 INJECTION, POWDER, FOR SOLUTION INTRAMUSCULAR; INTRAVENOUS at 17:26

## 2022-05-22 RX ADMIN — BUDESONIDE 500 MCG: 0.5 INHALANT RESPIRATORY (INHALATION) at 20:12

## 2022-05-22 RX ADMIN — FUROSEMIDE 80 MG: 10 INJECTION, SOLUTION INTRAMUSCULAR; INTRAVENOUS at 17:26

## 2022-05-22 NOTE — PROGRESS NOTES
46 Pt requesting something for upset stomach feels \"gassy\" provider paged. [phone] Provider ordered gi cocktail, administered. Removed nitro patch due to low BP of 91/62.    0316 Pt is now free of gi discomfort. 0710 Bedside and Verbal shift change report given to RAFFI Whyte (oncoming nurse) by Maria De Jesus Baeza RN   (offgoing nurse). Report included the following information SBAR, Kardex, ED Summary, Procedure Summary, Intake/Output, MAR, Recent Results and Med Rec Status.

## 2022-05-22 NOTE — PROGRESS NOTES
TRANSFER - OUT REPORT:    Verbal report given to fidelina Rn (name) on Loree Na  being transferred to ICU RN (unit) for change in patient condition(Monitor Kidney Functions )       Report consisted of patients Situation, Background, Assessment and   Recommendations(SBAR). Information from the following report(s) SBAR, Procedure Summary, Intake/Output and MAR was reviewed with the receiving nurse. Lines:   Peripheral IV 05/20/22 Right Antecubital (Active)   Site Assessment Bleeding 05/22/22 0830   Phlebitis Assessment 0 05/22/22 0830   Infiltration Assessment 0 05/22/22 0830   Dressing Status Intact 05/22/22 0830   Dressing Type Transparent 05/22/22 0830   Hub Color/Line Status Pink 05/22/22 0830   Action Taken Open ports on tubing capped 05/22/22 0830   Alcohol Cap Used Yes 05/22/22 0830       Peripheral IV 05/20/22 Right Forearm (Active)   Site Assessment Clean, dry, & intact 05/22/22 0830   Phlebitis Assessment 0 05/22/22 0830   Infiltration Assessment 0 05/22/22 0830   Dressing Status Clean, dry, & intact 05/22/22 0830   Dressing Type Transparent 05/22/22 0830   Hub Color/Line Status Pink 05/22/22 0830   Action Taken Open ports on tubing capped 05/21/22 0800   Alcohol Cap Used Yes 05/22/22 0830       Peripheral IV 05/20/22 Left Hand (Active)   Site Assessment Clean, dry, & intact 05/22/22 0830   Phlebitis Assessment 0 05/22/22 0830   Infiltration Assessment 0 05/22/22 0830   Dressing Status Clean, dry, & intact 05/22/22 0830   Dressing Type Transparent 05/22/22 0830   Hub Color/Line Status Flushed;Capped 05/22/22 0830   Action Taken Open ports on tubing capped 05/22/22 0830   Alcohol Cap Used Yes 05/22/22 0830        Opportunity for questions and clarification was provided.       Patient transported with:   Monitor  O2 @ 3L liters  Registered Nurse  [facility] Detail Level: Detailed Quality 130: Documentation Of Current Medications In The Medical Record: Current Medications Documented Quality 226: Preventive Care And Screening: Tobacco Use: Screening And Cessation Intervention: Patient screened for tobacco use and is an ex/non-smoker

## 2022-05-22 NOTE — PROGRESS NOTES
Problem: Falls - Risk of  Goal: *Absence of Falls  Description: Document Cindia Neigh Fall Risk and appropriate interventions in the flowsheet.   Outcome: Progressing Towards Goal  Note: Fall Risk Interventions:  Mobility Interventions: Assess mobility with egress test,Bed/chair exit alarm,Communicate number of staff needed for ambulation/transfer,Patient to call before getting OOB,Utilize walker, cane, or other assistive device         Medication Interventions: Bed/chair exit alarm,Evaluate medications/consider consulting pharmacy,Patient to call before getting OOB,Teach patient to arise slowly    Elimination Interventions: Bed/chair exit alarm,Call light in reach,Patient to call for help with toileting needs,Stay With Me (per policy),Toilet paper/wipes in reach,Toileting schedule/hourly rounds    History of Falls Interventions: Bed/chair exit alarm,Consult care management for discharge planning,Door open when patient unattended,Evaluate medications/consider consulting pharmacy,Investigate reason for fall,Room close to nurse's station         Problem: Pain  Goal: *Control of Pain  Outcome: Progressing Towards Goal     Problem: Fluid Volume - Risk of, Imbalanced  Goal: *Balanced intake and output  Outcome: Progressing Towards Goal

## 2022-05-22 NOTE — CONSULTS
[facility] Specialists  Pulmonary, Critical Care, and Sleep Medicine    Name: Harvey Gunn MRN: [id number]   : 1955 Hospital: [facility] [place]    Date: 2022  Room: [address] [place] [address] Note                                              Consult requesting physician: Dr. Ruth Altamirano  Reason for Consult: ICU monitoring for non-STEMI, acute on chronic systolic CHF    IMPRESSION:       Active Hospital Problems    Diagnosis Date Noted    NSTEMI (non-ST elevated myocardial infarction) ([facility]     Systolic CHF, chronic ([facility] 2022    Mild intermittent asthma without complication     S/P bilateral mastectomy 2014    Stage 4 chronic kidney disease ([facility] 2013    Atherosclerosis of coronary artery 2013    Essential hypertension 2013   ·    Patient Active Problem List   Diagnosis Code    Atherosclerosis of coronary artery I25.10    Essential hypertension I10    Malignant neoplasm of female breast ([facility] C50.919    Mild intermittent asthma without complication P99.00    Moderate malnutrition ([facility] E44.0    S/P bilateral mastectomy Z90.13    Stage 4 chronic kidney disease (HCC) N18.4    NSTEMI (non-ST elevated myocardial infarction) ([facility] L06.0    Systolic CHF, chronic (HCC) I50.22   ·   · Code status: Full Code       RECOMMENDATIONS:     Respiratory: Supplemental oxygen via nasal cannula at 3 L/min for goal SPO2  Protecting airway. Review chest x-ray 2022  Follow-up chest x-ray in a.m. Bronchodilator: DuoNeb as needed. Pulmicort and Brovana nebulizer. Patient on [place], as needed albuterol at home  Keep SPO2 >=92%. HOB 30 degree elevation all the time. Aggressive pulmonary toileting. Aspiration precautions. Incentive spirometry. CVS: Hemodynamically stable  Continue heparin IV per cardiology  Continue Lasix diuresis per cardiology, nephrology  Continue aspirin, Lipitor, Coreg, Nitro-Bid ointment  Cardiology following.   Defer any intervention and timing to respective provider    Echo 5/20/2022    Left Ventricle: Severely reduced left ventricular systolic function with a visually estimated EF of 15 - 20%. Left ventricle is mildly dilated. Severe septal thickening. Moderate posterior thickening. Severe global hypokinesis present. Grade III diastolic dysfunction with increased LAP.   Right Ventricle: Right ventricle is mildly dilated. Moderately reduced systolic function.   Aortic Valve: Tricuspid valve. Mildly thickened left, right and noncoronary cusps. Moderate annular calcification.   Mitral Valve: Findings consistent with rheumatic disease. Mildly thickened leaflet, at the anterior and posterior leaflets. Mild annular calcification at the posterior leaflet of the mitral valve. Moderate paravalvular regurgitation. Mild to moderate stenosis noted.   Tricuspid Valve: Mildly thickened leaflets.   Pericardium: There is evidence of epicardial fat. Trivial circumferential pericardial effusion present. No indication of cardiac tamponade.   Technical qualifiers: Echo study was technically difficult due to patient's body habitus    ID: No clinical evidence for pneumonia or sepsis  Monitor off of antibiotic    Hematology/Oncology: Monitor CBC, PTT, hematoma or any signs of bleeding while on heparin  Patient agrees to monitor any signs of bleeding, hematoma on heparin  Continue heparin per cardiology    Renal: Worsening S. Cr  Nephrology following  Monitor renal function, electrolytes and urine output    GI/: Diet as tolerated  Pepcid for GI prophylaxis    Endocrine: Monitor BS with am labs    Neurology: AAO x 4, nonfocal neurological exam    Pain/Sedation: Judicious opiate, sedative, hypnotics    Skin/Wound: As per nursing care protocol    Electrolytes: Replace electrolytes per ICU electrolyte replacement protocol. IVF: Avoid IV fluid possible    Nutrition: Diet    Prophylaxis: DVT Prophylaxis: SCD/heparin.  GI Prophylaxis: Diet/Pepcid. Restraints: none    Lines/Tubes: PIV    Advance Directive/Palliative Care: Full code. Consult as per clinical course. Will defer respective systems problem management to primary and other respective consultant and follow patient in ICU with primary and other medical team.  Further recommendations will be based on the patient's response to recommended treatment and results of the investigation ordered. Quality Care: PPI, DVT prophylaxis, HOB elevated, Infection control all reviewed and addressed. Care of plan d/w RN, RT, cardiology, hospitalist  D/w patient (answered all questions to satisfaction). High complexity decision making was performed during the evaluation of this patient at high risk for decompensation with multiple organ involvement. Total critical care time spent rendering care exclusive of procedures/family discussion/coordination of care: 55 minutes. Subjective/History of Present Illness:     Patient is a 74 y.o. female with PMHx significant for asthma, chronic systolic CHF, CKD, CAD, breast cancer status post double mastectomy, dysarthria, gout presented to [facility] ER with complaint of chest pressure like pain that started night prior to presentation, progressively increase without radiation and associated with orthopnea, shortness of breath but no fever, chills, cough, phlegm, wheezing, hemoptysis, palpitation, worsening in leg edema, syncope, lightheadedness. In ER, labs showed elevated troponin while EKG did not show any ST elevation. Patient started on heparin. Cardiology consulted and admitted to the floor. Her echo during hospital course showed worsening in her LVEF and labs showed worsening in S. Cr went cardiology recommended monitoring in ICU.   Patient feels significant improvement in shortness of breath and orthopnea compared to yesterday when she could only stay comfortable sitting up while today she is able to lay down but not completely flat.                  I/O last 24 hrs: Intake/Output Summary (Last 24 hours) at 5/22/2022 1641  Last data filed at 5/22/2022 3987  Gross per 24 hour   Intake 758.67 ml   Output 300 ml   Net 458.67 ml         History taken from patient, EMR      Review of Systems:   General ROS: negative for - fever, chills, weight loss, fatigue and malaise  Psychological ROS: negative for - anxiety or depression  Ophthalmic ROS: negative for - eye pain, loss of vision or photophobia  ENT ROS: negative for - epistaxis, headaches, sinus pain or vocal changes  Allergy and Immunology ROS: negative for - hives or postnasal drip  Hematological and Lymphatic ROS: negative for - bleeding problems, blood clots, jaundice, pallor or swollen lymph nodes  Endocrine ROS: negative for - skin changes, temperature intolerance or unexpected weight changes  Cardiovascular ROS: positive for - chest pain, edema, orthopnea; negative for- palpitations, murmur or pnd  Gastrointestinal ROS: no nausea, vomiting, abdominal pain, change in bowel habits, or black or bloody stools  Genito-Urinary ROS: no dysuria, trouble voiding, or hematuria  Musculoskeletal ROS: negative for - muscle pain or muscular weakness  Neurological ROS: no TIA or stroke symptoms  Dermatological ROS: negative for - pruritus, rash or skin lesion changes     Allergies   Allergen Reactions    Sulfa (Sulfonamide Antibiotics) Anxiety      Past Medical History:   Diagnosis Date    CHF [facility]congestive [facility] failure) ([facility]     Hypertension       No past surgical history on file. Social History     Tobacco Use    Smoking status: Never Smoker    Smokeless tobacco: Never Used   Substance Use Topics    Alcohol use: Not Currently      No family history on file. Prior to Admission medications    Medication Sig Start Date End Date Taking? Authorizing Provider   carvediloL (COREG) 6.25 mg tablet Take 6.25 mg by mouth two (2) times a day.  10/22/21   Other, MD Sayda     Current Facility-Administered Medications   Medication Dose Route Frequency    senna-docusate (PERICOLACE) 8.6-50 mg per tablet 1 Tablet  1 Tablet Oral DAILY    furosemide (LASIX) injection 80 mg  80 mg IntraVENous ONCE    atorvastatin (LIPITOR) tablet 40 mg  40 mg Oral DAILY    heparin 25,000 units in  mL infusion  11.2-25 Units/kg/hr IntraVENous TITRATE    aspirin chewable tablet 81 mg  81 mg Oral DAILY    allopurinoL (ZYLOPRIM) tablet 100 mg  100 mg Oral DAILY    [Held by provider] hydrALAZINE (APRESOLINE) tablet 10 mg  10 mg Oral TID    nitroglycerin (NITROBID) 2 % ointment 1 Inch  1 Inch Topical BID    carvediloL (COREG) tablet 6.25 mg  6.25 mg Oral BID WITH MEALS    famotidine (PF) (PEPCID) 20 mg in 0.9% sodium chloride 10 mL injection  20 mg IntraVENous Q12H    cefTRIAXone (ROCEPHIN) 1 g in 0.9% sodium chloride (MBP/ADV) 50 mL MBP  1 g IntraVENous Q24H         Objective:   Vital Signs:    Visit Vitals  /78   Pulse 72   Temp 98.1 °F (36.7 °C)   Resp 20   Ht 5' 7\" (1.702 m)   Wt 91.2 kg (201 lb 1.6 oz)   SpO2 100%   BMI 31.50 kg/m²       O2 Device: Nasal cannula   O2 Flow Rate (L/min): 3 l/min   Temp (24hrs), Av.9 °F (36.6 °C), Min:97.3 °F (36.3 °C), Max:98.4 °F (36.9 °C)       Intake/Output:   Last shift:      No intake/output data recorded. Last 3 shifts:  1901 -  0700  In: 758.7 [P.O.:600; I.V.:158.7]  Out: 300 [Urine:300]      Intake/Output Summary (Last 24 hours) at 2022 1641  Last data filed at 2022 0659  Gross per 24 hour   Intake 758.67 ml   Output 300 ml   Net 458.67 ml       Last 3 Recorded Weights in this Encounter    22 1146 22 1550 22   Weight: 88.5 kg (195 lb 1.6 oz) 88.5 kg (195 lb) 91.2 kg (201 lb 1.6 oz)         No results for input(s): PHI, PHI, POC2, PCO2I, PO2, PO2I, HCO3, HCO3I, FIO2, FIO2I in the last 72 hours. Physical Exam:     General/Neurology: Alert, Awake, O x 4, non-focal neurological exam, NAD.  On O2 via NC  Head: Normocephalic, without obvious abnormality, atraumatic. Eye:   EOM intact. No scleral icterus, no pallor, no cyanosis. Nose:   No sinus tenderness  Throat:  Lips, mucosa, and tongue normal. No oral thrush. Neck:   Supple, symmetric. No lymphadenopathy. Trachea midline  Lung: Moderate air entry bilateral equal. No wheezing. No stridors. No prolongded expiration. No accessory muscle use. Exam limitation secondary to body habitus  Heart:   Regular rate & rhythm. S1 S2 present. No murmur. No JVD. Abdomen:  Soft. NT. Obese. +BS. No masses. Extremities:  BL ankle pedal edema. No cyanosis. No clubbing. Pulses: 2+ and symmetric in DP. Capillary refill: normal BL  Lymphatic:  No cervical or supraclavicular palpable lymphadenopathy. Musculoskeletal: No joint swelling. No tenderness  Skin:   Color, texture, turgor normal. No rashes or lesions      Data:       Recent Results (from the past 24 hour(s))   CBC WITH AUTOMATED DIFF    Collection Time: 05/22/22  1:46 AM   Result Value Ref Range    WBC 6.0 4.6 - 13.2 K/uL    RBC 3.66 (L) 4.20 - 5.30 M/uL    HGB 10.2 (L) 12.0 - 16.0 g/dL    HCT 34.4 (L) 35.0 - 45.0 %    MCV 94.0 78.0 - 100.0 FL    MCH 27.9 24.0 - 34.0 PG    MCHC 29.7 (L) 31.0 - 37.0 g/dL    RDW 15.8 (H) 11.6 - 14.5 %    PLATELET 635 693 - 522 K/uL    MPV 12.3 (H) 9.2 - 11.8 FL    NRBC 0.7 (H) 0  WBC    ABSOLUTE NRBC 0.04 (H) 0.00 - 0.01 K/uL    NEUTROPHILS 75 (H) 40 - 73 %    LYMPHOCYTES 14 (L) 21 - 52 %    MONOCYTES 9 3 - 10 %    EOSINOPHILS 1 0 - 5 %    BASOPHILS 0 0 - 2 %    IMMATURE GRANULOCYTES 1 (H) 0.0 - 0.5 %    ABS. NEUTROPHILS 4.5 1.8 - 8.0 K/UL    ABS. LYMPHOCYTES 0.8 (L) 0.9 - 3.6 K/UL    ABS. MONOCYTES 0.6 0.05 - 1.2 K/UL    ABS. EOSINOPHILS 0.1 0.0 - 0.4 K/UL    ABS. BASOPHILS 0.0 0.0 - 0.1 K/UL    ABS. IMM.  GRANS. 0.0 0.00 - 0.04 K/UL    DF AUTOMATED     METABOLIC PANEL, BASIC    Collection Time: 05/22/22  1:46 AM   Result Value Ref Range    Sodium 138 136 - 145 mmol/L    Potassium 4.1 3.5 - 5.5 mmol/L    Chloride 106 100 - 111 mmol/L    CO2 26 21 - 32 mmol/L    Anion gap 6 3.0 - 18 mmol/L    Glucose 106 (H) 74 - 99 mg/dL    BUN 60 (H) 7.0 - 18 MG/DL    Creatinine 3.44 (H) 0.6 - 1.3 MG/DL    BUN/Creatinine ratio 17 12 - 20      GFR est AA 16 (L) >60 ml/min/1.73m2    GFR est non-AA 13 (L) >60 ml/min/1.73m2    Calcium 8.4 (L) 8.5 - 10.1 MG/DL   PTT    Collection Time: 05/22/22  1:46 AM   Result Value Ref Range    aPTT 62.5 (H) 23.0 - 36.4 SEC   PTT    Collection Time: 05/22/22  9:27 AM   Result Value Ref Range    aPTT 71.8 (H) 23.0 - 36.4 SEC   PTT    Collection Time: 05/22/22  3:43 PM   Result Value Ref Range    aPTT 79.2 (H) 23.0 - 36.4 SEC         Chemistry Recent Labs     05/22/22  0146 05/21/22  0542 05/20/22  1250   * 93 105*    143 144   K 4.1 4.1 3.4*    111 109   CO2 26 26 27   BUN 60* 50* 46*   CREA 3.44* 2.86* 2.70*   CA 8.4* 8.6 9.4   AGAP 6 6 8   BUCR 17 17 17   AP  --  79 87   TP  --  6.6 6.5   ALB  --  3.1* 3.3*   GLOB  --  3.5 3.2   AGRAT  --  0.9 1.0        Lactic Acid Lactic acid   Date Value Ref Range Status   01/14/2022 0.9 0.4 - 2.0 MMOL/L Final     No results for input(s): LAC in the last 72 hours. Liver Enzymes Protein, total   Date Value Ref Range Status   05/21/2022 6.6 6.4 - 8.2 g/dL Final     Albumin   Date Value Ref Range Status   05/21/2022 3.1 (L) 3.4 - 5.0 g/dL Final     Globulin   Date Value Ref Range Status   05/21/2022 3.5 2.0 - 4.0 g/dL Final     A-G Ratio   Date Value Ref Range Status   05/21/2022 0.9 0.8 - 1.7   Final     Alk.  phosphatase   Date Value Ref Range Status   05/21/2022 79 45 - 117 U/L Final     Recent Labs     05/21/22  0542 05/20/22  1250   TP 6.6 6.5   ALB 3.1* 3.3*   GLOB 3.5 3.2   AGRAT 0.9 1.0   AP 79 87        CBC w/Diff Recent Labs     05/22/22  0146 05/21/22  0542 05/20/22  1145   WBC 6.0 6.9 6.2   RBC 3.66* 3.84* 3.95*   HGB 10.2* 10.7* 11.1*   HCT 34.4* 36.5 36.4    193 192   GRANS 75* 80* 79*   LYMPH 14* 11* 12*   EOS 1 0 1        Cardiac Enzymes No results found for: CPK, CK, CKMMB, CKMB, RCK3, CKMBT, CKNDX, CKND1, BEBA, TROPT, TROIQ, MARILUZ, TROPT, TNIPOC, BNP, BNPP     BNP No results found for: BNP, BNPP, XBNPT     Coagulation Recent Labs     05/22/22  1543 05/22/22  0927 05/22/22  0146 05/20/22  2140 05/20/22  1145   PTP  --   --   --   --  14.7   INR  --   --   --   --  1.1   APTT 79.2* 71.8* 62.5*   < > 30.7    < > = values in this interval not displayed. Thyroid  No results found for: T4, T3U, TSH, TSHEXT    No results found for: T4     Urinalysis Lab Results   Component Value Date/Time    Color YELLOW 05/20/2022 12:00 PM    Appearance CLEAR 05/20/2022 12:00 PM    Specific gravity 1.017 05/20/2022 12:00 PM    pH (UA) 5.0 05/20/2022 12:00 PM    Protein 100 (A) 05/20/2022 12:00 PM    Glucose Negative 05/20/2022 12:00 PM    Ketone TRACE (A) 05/20/2022 12:00 PM    Bilirubin Negative 05/20/2022 12:00 PM    Urobilinogen 1.0 05/20/2022 12:00 PM    Nitrites Negative 05/20/2022 12:00 PM    Leukocyte Esterase SMALL (A) 05/20/2022 12:00 PM    Epithelial cells 1+ 05/20/2022 12:00 PM    Bacteria 1+ (A) 05/20/2022 12:00 PM    WBC 11 to 20 05/20/2022 12:00 PM    RBC Negative 05/20/2022 12:00 PM        Micro  No results for input(s): SDES, CULT in the last 72 hours. No results for input(s): CULT in the last 72 hours. Culture data during this hospitalization. All Micro Results     None             XR CHEST PORT    Result Date: 5/20/2022  EXAM: One-view chest CLINICAL HISTORY: Chest pain , COMPARISON: None FINDINGS: Frontal view of the chest demonstrate no focal airspace consolidation. Linear bands probable scarring in the suprahilar region bilaterally again noted. Asymmetric elevation of the right hemidiaphragm again noted. . Cardiac silhouette is normal in size and contour. No acute bony or soft tissue abnormality. No acute pulmonary process identified.      ECHO ADULT COMPLETE    Result Date: 5/20/2022  [facility] Left Ventricle: Severely reduced left ventricular systolic function with a visually estimated EF of 15 - 20%. Left ventricle is mildly dilated. Severe septal thickening. Moderate posterior thickening. Severe global hypokinesis present. Grade III diastolic dysfunction with increased LAP.   Right Ventricle: Right ventricle is mildly dilated. Moderately reduced systolic function.   Aortic Valve: Tricuspid valve. Mildly thickened left, right and noncoronary cusps. Moderate annular calcification.   Mitral Valve: Findings consistent with rheumatic disease. Mildly thickened leaflet, at the anterior and posterior leaflets. Mild annular calcification at the posterior leaflet of the mitral valve. Moderate paravalvular regurgitation. Mild to moderate stenosis noted.   Tricuspid Valve: Mildly thickened leaflets.   Pericardium: There is evidence of epicardial fat. Trivial circumferential pericardial effusion present. No indication of cardiac tamponade.   Technical qualifiers: Echo study was technically difficult due to patient's body habitus.   Contrast used: Definity. Images report reviewed by me:  CT (Most Recent) (CT chest reviewed by me) Results from Hospital Encounter encounter on 01/13/22    CTA CHEST W OR W WO CONT    Narrative  EXAM: CTA Chest    CLINICAL INDICATION/HISTORY: Shortness of breath, Covid positive. Possible PE.    COMPARISON: Plain film chest same day    TECHNIQUE: Axial CT imaging from the thoracic inlet through the diaphragm with  intravenous contrast utilizing CTA study for pulmonary artery evaluation. Coronal and sagittal MIP reformations were generated at a separate workstation. One or more dose reduction techniques were used on this CT: automated exposure  control, adjustment of the mAs and/or kVp according to patient's size, and  iterative reconstruction techniques. The specific techniques utilized on this CT  exam have been documented in the patient's electronic medical record. Digital  imaging and communications and medicine (DICOM) format image data are available  to nonaffiliated external healthcare facilities or entities on a secure, media  free, reciprocally searchable basis with patient authorization for at least a 12  month period after this study. _______________    FINDINGS:    EXAM QUALITY: Overall exam quality is adequate. Pulmonary arterial enhancement  is adequate. The breath hold is satisfactory. Respiratory motion artifact is  present, limiting evaluation of peripheral vessels. PULMONARY ARTERIES: No convincing evidence of pulmonary embolism. MEDIASTINUM: Mild diffuse cardiomegaly with coronary artery calcifications with  no pericardial effusion. Mild atherosclerotic changes and ectasia thoracic  aorta. LYMPH NODES: Borderline enlarged mediastinal lymph nodes maximally 12 mm AP  window short axis dimension. AIRWAY: Unremarkable. LUNGS: Mild partial atelectasis bilateral lower lobes and mild small peripheral  areas of groundglass opacity bilateral upper lobes as well as small bandlike  atelectasis or scarring right upper lobe. No abnormal mass. PLEURA: No pleural effusion or pneumothorax. UPPER ABDOMEN: Reflux of contrast into the IVC and hepatic veins. Several  nonobstructing right renal calculi the largest right lower pole maximally 12 mm. Several small nonobstructing left renal calculi. .    OTHER: No acute or aggressive osseous abnormalities identified. _______________    Impression  1. Mildly limited study due to motion obscuring peripheral vessels but no  definite evidence of pulmonary embolism. 2. Mild areas of bilateral lower lobe and partial right upper lobe atelectasis. Very mild peripheral nonspecific groundglass opacities bilateral upper lobes  which can be related to small airway disease, edema or atypical infection. 3. Cardiomegaly with reflux of contrast into IVC suggesting right heart failure.     4. Incidental bilateral nonobstructing renal calculi. CXR reviewed by me:  XR (Most Recent). CXR  reviewed by me and compared with previous CXR Results from Hospital Encounter encounter on 05/20/22    XR CHEST PORT    Narrative  EXAM: One-view chest    CLINICAL HISTORY: Chest pain ,    COMPARISON: None    FINDINGS:    Frontal view of the chest demonstrate no focal airspace consolidation. Linear  bands probable scarring in the suprahilar region bilaterally again noted. Asymmetric elevation of the right hemidiaphragm again noted. . Cardiac silhouette  is normal in size and contour. No acute bony or soft tissue abnormality. Impression  No acute pulmonary process identified. ·Please note: Voice-recognition software may have been used to generate this report, which may have resulted in some phonetic-based errors in grammar and contents. Even though attempts were made to correct all the mistakes, some may have been missed, and remained in the body of the document.       Priscilla Medina MD  5/22/2022

## 2022-05-22 NOTE — PROGRESS NOTES
Pharmacy Renal Dosing Services:     Famotidine was automatically dose-adjusted per [facility] P&T Committee Protocol, with respect to renal function. Consult provided for this   74 y.o. , female , for the indication of Symptomatic GERD  Dose adjusted to:  Famotidine 20  mg IV q24h    Pt Weight:   Wt Readings from Last 1 Encounters:   05/21/22 91.2 kg (201 lb 1.6 oz)       Previous Regimen   Famotidine 20 mg IV q12h   Serum Creatinine Lab Results   Component Value Date/Time    Creatinine 3.44 (H) 05/22/2022 01:46 AM       Creatinine Clearance Estimated Creatinine Clearance: 18.9 mL/min (A) (based on SCr of 3.44 mg/dL (H)). BUN Lab Results   Component Value Date/Time    BUN 60 (H) 05/22/2022 01:46 AM         Pharmacy to continue to monitor patient daily. Will make dosage adjustments based upon changing renal function.   Signed Naima Meehan information:  [phone]

## 2022-05-22 NOTE — CONSULTS
RENAL CONSULT  2022    Patient: Jessy Montalvo  :  1955  Gender:  female  MRN #:  [id number]    Reason for Consult: Acute renal failure on CKD     History of Present Illness:    Jessy Montalvo is a 74y.o. year old female with h/o CKD stage G4 , congestive heart failure, coronary artery disease with stents, hypertension presented with chest pain and SOB. She was found to have BNAP , troponin admitted for cardiac work up   She uses home oxygen 2 liter per minute . She was initially given IV fluid and lasix yesterday . When I saw her this morning , she says her breathing is better along with leg edema . She denied taking NSAIDS at home   Denied problems in urination. No other symptoms at present     Past Medical History:   Diagnosis Date    CHF (congestive heart failure) ([facility] [address]     Hypertension      No past surgical history on file. No family history on file.   Allergies   Allergen Reactions    Sulfa (Sulfonamide Antibiotics) Anxiety     Current Facility-Administered Medications   Medication Dose Route Frequency Provider Last Rate Last Admin    senna-docusate (PERICOLACE) 8.6-50 mg per tablet 1 Tablet  1 Tablet Oral DAILY Ian Lane MD        atorvastatin (LIPITOR) tablet 40 mg  40 mg Oral DAILY Colette ORTEGA MD   40 mg at 22    heparin 25,000 units in  mL infusion  11.2-25 Units/kg/hr IntraVENous TITRATE Anson Lange MD 14.5 mL/hr at 22 0723 16.35 Units/kg/hr at 22 0723    aspirin chewable tablet 81 mg  81 mg Oral DAILY Kimberly Aguila MD   81 mg at 22 2851    [Held by provider] furosemide (LASIX) injection 20 mg  20 mg IntraVENous DAILY Kimberly Aguila MD   20 mg at 22 1490    allopurinoL (ZYLOPRIM) tablet 100 mg  100 mg Oral DAILY Kimberly Aguila MD   100 mg at 22 6733    [Held by provider] hydrALAZINE (APRESOLINE) tablet 10 mg  10 mg Oral TID Kimberly Aguila MD   10 mg at 22 2134    nitroglycerin (NITROBID) 2 % ointment 1 Inch  1 Inch Topical BID Krystyna Yap MD   1 Inch at 05/21/22 2134    carvediloL (COREG) tablet 6.25 mg  6.25 mg Oral BID WITH MEALS Krystyna Yap MD   6.25 mg at 05/21/22 1606    acetaminophen (TYLENOL) tablet 650 mg  650 mg Oral Q6H PRN Krystyna Yap MD   650 mg at 05/22/22 1782    morphine injection 2 mg  2 mg IntraVENous Q3H PRN Krystyna Yap MD        [facility]ron [facility]) injection 4 mg  4 mg IntraVENous Q6H PRN Krystyna Yap MD        diphenhydrAMINE (BENADRYL) injection 12.5 mg  12.5 mg IntraVENous Q6H PRN rKystyna Yap MD        famotidine (PF) (PEPCID) 20 mg in 0.9% sodium chloride 10 mL injection  20 mg IntraVENous Q12H Krystyna Yap MD   20 mg at 05/21/22 2135    cefTRIAXone (ROCEPHIN) 1 g in 0.9% sodium chloride (MBP/ADV) 50 mL MBP  1 g IntraVENous Q24H Krystyna Yap  mL/hr at 05/21/22 1605 1 g at 05/21/22 1605           Review of Symptoms:     Consitutional Symptoms: No fever, weight loss, weight gain and fatigue  Eyes:- No change in vision , no itching   Ears, Nose, Mouth, Throat:  No neck pain , swelling ,hearing loss and nose bleed. Pulmonary: No cough and  But has shortness of breath . CVS: has Chest pain but no  palpitation and orthopnea  GI: No nausea, vomiting, abdominal pain, and blood in stool   - No burning, frequency ,urgency  and difficulty voiding . Neurological:No dizzy ness, syncope, focal weakness and numbness . Skin : No rash and erythema   Endocrine: No feeling of excessive cold or warmth, hot flushes  Psychiatric: Denied feeling depressed    Objective:    Visit Vitals  /74   Pulse 76   Temp 98.4 °F (36.9 °C)   Resp 20   Ht 5' 7\" (1.702 m)   Wt 91.2 kg (201 lb 1.6 oz)   SpO2 100%   BMI 31.50 kg/m²       Physical Exam:    Pt awake,  alert and comfortable  HEENT: No JVD, anicteric sclera, no neck swelling  Lung: Fine crackles   Heart: s1s2 regualr no rubs or murmur  Abdomen: soft, non tender, no guarding, normal bowel sounds.   Ext: trace edema and pulsation intact  Skin: No rash and erythema  CNS- Oriented to time , place and person     Laboratory Data:    Lab Results   Component Value Date    BUN 60 (H) 05/22/2022    BUN 50 (H) 05/21/2022    BUN 46 (H) 05/20/2022     05/22/2022     05/21/2022     05/20/2022    CO2 26 05/22/2022    CO2 26 05/21/2022    CO2 27 05/20/2022     Lab Results   Component Value Date    WBC 6.0 05/22/2022    HGB 10.2 (L) 05/22/2022    HCT 34.4 (L) 05/22/2022     No components found for: CALCIUM, PHOSPHORUS, MAGNESIUM  No results found for: HDL  No results found for: SPECIMENTYP, TURBIDITY, UGLU    Imaging Reveiwed:    CXR reviwed     Assessment:  Nathan Michael is a 74y.o. year old female with h/o CKD stage G4 , congestive heart failure, coronary artery disease with stents, hypertension presented with chest pain and SOB. She has elevated BNAP and troponin consistent with NSTEMI, on heparin drip now   She uses oxygen at home, feels shortness breath has improved that yesterday and improvement in ankle edema as well   She developed acute kidney injury on CKD with creatinine elevation from 2.86 mg/dl to 3.44 mg/dl in 24 hours   Clinically she does not have overt fluid overload, symptomatically improved after lasix overnight .      Consider tentative plan for cardiac cath tomorrow which definitely increases the risk of contrast induced nephropathy would hold iv diuretics today in setting of LEDY and stable breathing     # Acute renal failure- Most likely due to cardiac decompensation   #CKD stage 4   # HTN  #CHF            Plan:      -Hold diuretics today to prevent further volume depletion in anticipation to contrast exposure tomorrow  , fluid restriction to 1500 cc in 24 hours and less than 2 gram salt per day   - Consider lasix iv  mg prn for CHF exacerbation/worsening SOB   - Bladder scan daily to ensure she is not retaining  - Urine electrolytes   - No clinical and metabolic indication of dialysis   -Intake and output recording   - Avoid contrast and nephrotoxin   - consider low dose contrast if she were to get cardiac cath   - Dose all  meds for current eGFR   - Coreg for HTN          Aamir Fritz MD, MD  [facility].- Nephrology

## 2022-05-22 NOTE — PROGRESS NOTES
TRANSFER - IN REPORT:    Verbal report received from Mainor Licea RN(name) on [facility]  being received from Telemetry(unit) for ordered procedure      Report consisted of patients Situation, Background, Assessment and   Recommendations(SBAR). Information from the following report(s) SBAR, Kardex, Intake/Output, MAR, Recent Results and Cardiac Rhythm SR was reviewed with the receiving nurse. Opportunity for questions and clarification was provided. Assessment completed upon patients arrival to unit and care assumed. Assessment documented per relevant flow sheet, family of pt at bedside, updated on plan of care, all questions answered to their liking, no additional questions or concerns.  Pt oriented to room, bed in lowest locked position, pt is afebrile, denies pain, VSS on 3LNC, all needs addressed at this time, no additional requests

## 2022-05-23 ENCOUNTER — APPOINTMENT (OUTPATIENT)
Dept: GENERAL RADIOLOGY | Age: 67
DRG: 280 | End: 2022-05-23
Attending: INTERNAL MEDICINE
Payer: MEDICARE

## 2022-05-23 PROBLEM — F41.1 GENERALIZED ANXIETY DISORDER: Status: ACTIVE | Noted: 2022-05-23

## 2022-05-23 PROBLEM — J96.11 CHRONIC RESPIRATORY FAILURE WITH HYPOXIA (HCC): Status: ACTIVE | Noted: 2022-05-23

## 2022-05-23 PROBLEM — J98.6 ELEVATED DIAPHRAGM: Status: ACTIVE | Noted: 2022-05-23

## 2022-05-23 PROBLEM — I25.5 ISCHEMIC CARDIOMYOPATHY: Status: ACTIVE | Noted: 2022-05-23

## 2022-05-23 LAB
ANION GAP SERPL CALC-SCNC: 6 MMOL/L (ref 3–18)
APTT PPP: 88 SEC (ref 23–36.4)
BASOPHILS # BLD: 0 K/UL (ref 0–0.1)
BASOPHILS NFR BLD: 0 % (ref 0–2)
BUN SERPL-MCNC: 66 MG/DL (ref 7–18)
BUN/CREAT SERPL: 21 (ref 12–20)
CALCIUM SERPL-MCNC: 8.9 MG/DL (ref 8.5–10.1)
CHLORIDE SERPL-SCNC: 107 MMOL/L (ref 100–111)
CO2 SERPL-SCNC: 26 MMOL/L (ref 21–32)
CREAT SERPL-MCNC: 3.15 MG/DL (ref 0.6–1.3)
DIFFERENTIAL METHOD BLD: ABNORMAL
EOSINOPHIL # BLD: 0 K/UL (ref 0–0.4)
EOSINOPHIL NFR BLD: 1 % (ref 0–5)
ERYTHROCYTE [DISTWIDTH] IN BLOOD BY AUTOMATED COUNT: 15.9 % (ref 11.6–14.5)
GLUCOSE SERPL-MCNC: 89 MG/DL (ref 74–99)
HCT VFR BLD AUTO: 32.5 % (ref 35–45)
HGB BLD-MCNC: 10 G/DL (ref 12–16)
IMM GRANULOCYTES # BLD AUTO: 0 K/UL (ref 0–0.04)
IMM GRANULOCYTES NFR BLD AUTO: 0 % (ref 0–0.5)
LYMPHOCYTES # BLD: 0.6 K/UL (ref 0.9–3.6)
LYMPHOCYTES NFR BLD: 12 % (ref 21–52)
MAGNESIUM SERPL-MCNC: 2.3 MG/DL (ref 1.6–2.6)
MCH RBC QN AUTO: 28.4 PG (ref 24–34)
MCHC RBC AUTO-ENTMCNC: 30.8 G/DL (ref 31–37)
MCV RBC AUTO: 92.3 FL (ref 78–100)
MONOCYTES # BLD: 0.6 K/UL (ref 0.05–1.2)
MONOCYTES NFR BLD: 11 % (ref 3–10)
NEUTS SEG # BLD: 3.8 K/UL (ref 1.8–8)
NEUTS SEG NFR BLD: 76 % (ref 40–73)
NRBC # BLD: 0.02 K/UL (ref 0–0.01)
NRBC BLD-RTO: 0.4 PER 100 WBC
PLATELET # BLD AUTO: 159 K/UL (ref 135–420)
PMV BLD AUTO: 12.5 FL (ref 9.2–11.8)
POTASSIUM SERPL-SCNC: 3.9 MMOL/L (ref 3.5–5.5)
RBC # BLD AUTO: 3.52 M/UL (ref 4.2–5.3)
SODIUM SERPL-SCNC: 139 MMOL/L (ref 136–145)
WBC # BLD AUTO: 5 K/UL (ref 4.6–13.2)

## 2022-05-23 PROCEDURE — 74011000250 HC RX REV CODE- 250: Performed by: STUDENT IN AN ORGANIZED HEALTH CARE EDUCATION/TRAINING PROGRAM

## 2022-05-23 PROCEDURE — 74011250637 HC RX REV CODE- 250/637: Performed by: HOSPITALIST

## 2022-05-23 PROCEDURE — 77010033678 HC OXYGEN DAILY

## 2022-05-23 PROCEDURE — 74011250637 HC RX REV CODE- 250/637: Performed by: STUDENT IN AN ORGANIZED HEALTH CARE EDUCATION/TRAINING PROGRAM

## 2022-05-23 PROCEDURE — 94640 AIRWAY INHALATION TREATMENT: CPT

## 2022-05-23 PROCEDURE — 80048 BASIC METABOLIC PNL TOTAL CA: CPT

## 2022-05-23 PROCEDURE — 74011250636 HC RX REV CODE- 250/636: Performed by: HOSPITALIST

## 2022-05-23 PROCEDURE — 74011250636 HC RX REV CODE- 250/636: Performed by: EMERGENCY MEDICINE

## 2022-05-23 PROCEDURE — 85730 THROMBOPLASTIN TIME PARTIAL: CPT

## 2022-05-23 PROCEDURE — 36415 COLL VENOUS BLD VENIPUNCTURE: CPT

## 2022-05-23 PROCEDURE — 71045 X-RAY EXAM CHEST 1 VIEW: CPT

## 2022-05-23 PROCEDURE — 83735 ASSAY OF MAGNESIUM: CPT

## 2022-05-23 PROCEDURE — 85025 COMPLETE CBC W/AUTO DIFF WBC: CPT

## 2022-05-23 PROCEDURE — 74011250637 HC RX REV CODE- 250/637: Performed by: INTERNAL MEDICINE

## 2022-05-23 PROCEDURE — 74011000250 HC RX REV CODE- 250: Performed by: INTERNAL MEDICINE

## 2022-05-23 PROCEDURE — 65610000006 HC RM INTENSIVE CARE

## 2022-05-23 PROCEDURE — 74011000250 HC RX REV CODE- 250: Performed by: HOSPITALIST

## 2022-05-23 RX ORDER — BUMETANIDE 0.25 MG/ML
2 INJECTION INTRAMUSCULAR; INTRAVENOUS EVERY 12 HOURS
Status: DISCONTINUED | OUTPATIENT
Start: 2022-05-23 | End: 2022-05-26 | Stop reason: HOSPADM

## 2022-05-23 RX ORDER — BUSPIRONE HYDROCHLORIDE 5 MG/1
5 TABLET ORAL
COMMUNITY

## 2022-05-23 RX ORDER — FUROSEMIDE 40 MG/1
40 TABLET ORAL DAILY
COMMUNITY

## 2022-05-23 RX ORDER — HYDRALAZINE HYDROCHLORIDE 10 MG/1
10 TABLET, FILM COATED ORAL 3 TIMES DAILY
COMMUNITY

## 2022-05-23 RX ORDER — METOLAZONE 5 MG/1
5 TABLET ORAL DAILY
Status: DISCONTINUED | OUTPATIENT
Start: 2022-05-23 | End: 2022-05-24

## 2022-05-23 RX ORDER — BUSPIRONE HYDROCHLORIDE 5 MG/1
5 TABLET ORAL
Status: DISCONTINUED | OUTPATIENT
Start: 2022-05-23 | End: 2022-05-26 | Stop reason: HOSPADM

## 2022-05-23 RX ADMIN — ACETAMINOPHEN 650 MG: 325 TABLET ORAL at 17:20

## 2022-05-23 RX ADMIN — CARVEDILOL 6.25 MG: 3.12 TABLET, FILM COATED ORAL at 18:29

## 2022-05-23 RX ADMIN — ASPIRIN 81 MG: 81 TABLET, CHEWABLE ORAL at 08:43

## 2022-05-23 RX ADMIN — CARVEDILOL 6.25 MG: 3.12 TABLET, FILM COATED ORAL at 08:43

## 2022-05-23 RX ADMIN — BUMETANIDE 2 MG: 0.25 INJECTION, SOLUTION INTRAMUSCULAR; INTRAVENOUS at 20:53

## 2022-05-23 RX ADMIN — BUMETANIDE 2 MG: 0.25 INJECTION, SOLUTION INTRAMUSCULAR; INTRAVENOUS at 12:30

## 2022-05-23 RX ADMIN — FAMOTIDINE 20 MG: 10 INJECTION INTRAVENOUS at 08:43

## 2022-05-23 RX ADMIN — BUSPIRONE HYDROCHLORIDE 5 MG: 5 TABLET ORAL at 18:55

## 2022-05-23 RX ADMIN — BUDESONIDE 500 MCG: 0.5 INHALANT RESPIRATORY (INHALATION) at 20:34

## 2022-05-23 RX ADMIN — MORPHINE SULFATE 2 MG: 2 INJECTION, SOLUTION INTRAMUSCULAR; INTRAVENOUS at 23:22

## 2022-05-23 RX ADMIN — ARFORMOTEROL TARTRATE 15 MCG: 15 SOLUTION RESPIRATORY (INHALATION) at 07:26

## 2022-05-23 RX ADMIN — BUSPIRONE HYDROCHLORIDE 5 MG: 5 TABLET ORAL at 09:14

## 2022-05-23 RX ADMIN — ATORVASTATIN CALCIUM 40 MG: 20 TABLET, FILM COATED ORAL at 08:43

## 2022-05-23 RX ADMIN — NITROGLYCERIN 1 INCH: 20 OINTMENT TOPICAL at 08:43

## 2022-05-23 RX ADMIN — Medication 1 TABLET: at 08:43

## 2022-05-23 RX ADMIN — METOLAZONE 5 MG: 5 TABLET ORAL at 11:50

## 2022-05-23 RX ADMIN — HEPARIN SODIUM 17.51 UNITS/KG/HR: 5000 INJECTION INTRAVENOUS; SUBCUTANEOUS at 06:32

## 2022-05-23 RX ADMIN — BUDESONIDE 500 MCG: 0.5 INHALANT RESPIRATORY (INHALATION) at 07:29

## 2022-05-23 RX ADMIN — ALLOPURINOL 100 MG: 100 TABLET ORAL at 10:56

## 2022-05-23 RX ADMIN — NITROGLYCERIN 1 INCH: 20 OINTMENT TOPICAL at 20:53

## 2022-05-23 NOTE — PROGRESS NOTES
RENAL CONSULT PROGRESS NOTE   2022    Patient: Unique Garcia  :  1955  Gender:  female  MRN #:  [id number]    Reason for Consult: Acute renal failure on CKD       Assessment:  Unique Garcia is a 74y.o. year old female with h/o CKD stage G4 , congestive heart failure, coronary artery disease with stents, hypertension presented with chest pain and SOB. Has elevated BNAP and troponin consistent with NSTEMI, on heparin drip now . Uses oxygen at home for CHF . She developed acute kidney injury on CKD with creatinine elevation from 2.86 mg/dl to 3.44 mg/dl in 24 hours on     She has been seen by cardiology , ECHO showed EF 15-20 % , grade 3 diastolic CHF, still on oxygen . # Acute renal failure- Most likely due to cardiac decompensation   #CKD stage 4   # HTN  # Acute CHF  #NSTEMI     Subjective/Relevant events:  Transferred in ICU yesterday upon cardiology recs for advanced cardiomyopathy and orthopnea  Urine output last 24 hours is only 400 cc  She says she is okay while sitting in bed but feels more short of breath on lying down   Received lasix 80 mg last night        Plan:    - Not much urine output on iv lasix 80 mg last night.  considering orthopnea and advanced cardiomyopathy , diastolic dysfunction would need more diuresis   - will start Bumex 2 mg IV BID and metolazone 5 mg daily. Reanl functions most likely will not improve significantly due to advanced CHF/cardiomyopathy   - She does not have clinical indications of dialysis yet.  I discussed the possibility of dialysis  if volume status does not improve with diuretics or renal functions continues to deteriorate   - Further work up and treatment per cardiology   - Bladder scan daily to ensure she is not retaining  - -Intake and output recording   - Avoid contrast and nephrotoxin   - consider low dose contrast if she were to get cardiac cath   - Dose all  meds for current eGFR   - Coreg for HTN        Intake/Output Summary (Last 24 hours) at 5/23/2022 1137  Last data filed at 5/23/2022 1000  Gross per 24 hour   Intake 561.5 ml   Output 700 ml   Net -138.5 ml                 Visit Vitals  BP (!) 149/90   Pulse 72   Temp 97.5 °F (36.4 °C)   Resp 17   Ht 5' 7\" (1.702 m)   Wt 91.2 kg (201 lb 1.6 oz)   SpO2 96%   BMI 31.50 kg/m²       Physical Exam:    Pt awake,  alert and comfortable  HEENT:  no neck swelling  Lung: Fine crackles at lung vase   Heart: s1s2 regualr no rubs or murmur  Abdomen: soft, non tender, no guarding, normal bowel sounds. Ext: trace edema and pulsation intact  Skin: No rash and erythema  CNS- Oriented to time , place and person     Laboratory Data:    Lab Results   Component Value Date    BUN 66 (H) 05/23/2022    BUN 60 (H) 05/22/2022    BUN 50 (H) 05/21/2022     05/23/2022     05/22/2022     05/21/2022    CO2 26 05/23/2022    CO2 26 05/22/2022    CO2 26 05/21/2022     Lab Results   Component Value Date    WBC 5.0 05/23/2022    HGB 10.0 (L) 05/23/2022    HCT 32.5 (L) 05/23/2022     No components found for: CALCIUM, PHOSPHORUS, MAGNESIUM  No results found for: HDL  No results found for: SPECIMENTYP, TURBIDITY, UGLU    Imaging Reveiwed:    CXR reviwed       discussed with the patient and ICU team     Approx 32     minutes of critical care time spent in the direct evaluation and formulation of treatment plan of this high risk patient. The reason for providing this level of medical care was due a critical illness that impaired one or more vital organ systems such that there was a high probability of imminent or life threatening deterioration in the patients condition. This care involved high complexity decision making to assess, manipulate, and support vital system functions, to treat this degreee vital organ system failure and to prevent further life threatening deterioration of the patients condition.          Jacky Hair MD, MD [facility]- Nephrology

## 2022-05-23 NOTE — PROGRESS NOTES
[facility]ists  Pulmonary, Critical Care, and Sleep Medicine    Name: Jessy Montalvo MRN: [id number]   : 1955 Hospital: [facility]    Date: 2022  Room: [address] [place] [address] Note                                              Consult requesting physician: Dr. Chong Nj  Reason for Consult: ICU monitoring for non-STEMI, acute on chronic systolic CHF    IMPRESSION:     -Non-ST MI  -Chronic respiratory failure with hypoxemia  - Ischemic cardiomyopathy  - Coronary artery disease  - Asthma  -Generalized anxiety      Active Hospital Problems    Diagnosis Date Noted    Elevated diaphragm 2022    Ischemic cardiomyopathy 2022    [facility] with hypoxia [facility] 2022    Generalized anxiety disorder 2022    NSTEMI (non-ST elevated myocardial infarction) ([facility]     Systolic CHF, chronic ([facility] 2022    Mild intermittent asthma without complication     S/P bilateral mastectomy 2014    Stage 4 chronic kidney disease ([facility] 2013    Atherosclerosis of coronary artery 2013    Essential hypertension 2013   ·    Patient Active Problem List   Diagnosis Code    Atherosclerosis of coronary artery I25.10    Essential hypertension I10    Malignant neoplasm of female breast ([facility] C50.919    Mild intermittent asthma without complication Y97.17    Moderate malnutrition (HCC) E44.0    S/P bilateral mastectomy Z90.13    Stage 4 chronic kidney disease (HCC) N18.4    NSTEMI (non-ST elevated myocardial infarction) ([facility] K43.1    Systolic CHF, chronic (HCC) I50.22     · Code status: Full Code       RECOMMENDATIONS:     Respiratory: Stable respirations; patient on nasal cannula oxygen-2 L. Chest x-ray shows chronic right diaphragm elevation, seen on prior chest CT as well, with basal atelectasis. Bronchodilators- stop Brovana due to acute MI; continue budesonide nebulizer twice daily. Keep SPO2 >=92%.  HOB 30 degree elevation all the time. Aspiration precautions. Incentive spirometry. CVS: Stable hemodynamics; patient not needing pressors; telemetry-sinus rhythm. Heparin infusion per ACS protocol. Patient on aspirin, statin, Coreg, nitroglycerin patch. Cardiology on case-deciding about cardiac cath based on renal function. Echocardiogram- cardiomyopathy with EF 42-06%; grade 3 diastolic dysfunction; no pulmonary hypertension reported. ID: No active infection; continue to monitor. Hematology/Oncology: Monitor hemoglobin and platelets; mild anemia; no active bleeding issues. Monitor PTT while on heparin drip. Renal: Monitor renal function; creatinine 3.15. Avoid nephrotoxins; nephrology on case. GI/: Oral diet as tolerated  Pepcid for GI prophylaxis    Endocrine: Monitor BS with am labs. Neurology: Normal mentation. BuSpar 5 mg 3 times a day as needed-home dose. Skin/Wound: As per nursing care protocol    Electrolytes: Replace electrolytes per ICU electrolyte replacement protocol. IVF: Currently, no maintenance IV fluids. Nutrition: Oral diet. Prophylaxis: DVT Prophylaxis: Heparin. GI Prophylaxis: Famotidine. Restraints: none needed    Lines/Tubes: PIVs  External urinary catheter 5/23    Advance Directive/Palliative Care: Full code. Patient does not need palliative care consultation at this time. Will defer respective systems problem management to primary and other respective consultant and follow patient in ICU with primary and other medical team.  Further recommendations will be based on the patient's response to recommended treatment and results of the investigation ordered. Quality Care: PPI, DVT prophylaxis, HOB elevated, Infection control all reviewed and addressed. Care of plan d/w RN. Discussed with patient and updated management plans in detail.   High complexity decision making was performed during the evaluation of this patient at high risk for decompensation with multiple organ involvement. Total critical care time spent rendering care exclusive of procedures/family discussion/coordination of care: 38 minutes. Subjective/History of Present Illness:     Patient is a 74 y.o. female with PMHx significant for asthma, chronic systolic CHF, CKD, CAD, breast cancer status post double mastectomy, dysarthria, gout presented to [facility] ER with complaint of chest pressure like pain that started night prior to presentation, progressively increase without radiation and associated with orthopnea, shortness of breath symptoms. In ER, labs showed elevated troponin while EKG did not show any ST elevation. Patient started on heparin. Cardiology consulted and admitted to the floor. Her echo during hospital course showed worsening in her LVEF and labs showed worsening in S. Cr.  Subsequently, cardiology recommended monitoring in ICU, and patient was moved to ICU on 5/22/2022.    5/23/2022   Patient seen in ICU; chart reviewed, discussed during signout. No acute issues reported overnight. Patient denies any chest pains. She denies any shortness of breath or wheezing. She denies smoking. Patient on home O2 since March 2022 following hospital discharge; denies prior history of COPD. Telemetry-sinus rhythm. Review of Systems:   -No fever or chills  - No chest pain or palpitations  - No vomiting or diarrhea  - No cough or hemoptysis  - No bleeding symptoms      Allergies   Allergen Reactions    Sulfa (Sulfonamide Antibiotics) Anxiety      Past Medical History:   Diagnosis Date    [facility]est[facility] failure) [facility]     Hypertension       No past surgical history on file. Social History     Tobacco Use    Smoking status: Never Smoker    Smokeless tobacco: Never Used   Substance Use Topics    Alcohol use: Not Currently      No family history on file. Prior to Admission medications    Medication Sig Start Date End Date Taking?  Authorizing Provider   furosemide (LASIX) 40 mg tablet Take 40 mg by mouth daily. Yes Provider, Historical   hydrALAZINE (APRESOLINE) 10 mg tablet Take 10 mg by mouth three (3) times daily. Yes Provider, Historical   busPIRone (BUSPAR) 5 mg tablet Take 5 mg by mouth three (3) times daily as needed. Indications: repeated episodes of anxiety   Yes Provider, Historical   carvediloL (COREG) 6.25 mg tablet Take 6.25 mg by mouth two (2) times a day.  10/22/21   Other, MD Sayda     Current Facility-Administered Medications   Medication Dose Route Frequency    senna-docusate (PERICOLACE) 8.6-50 mg per tablet 1 Tablet  1 Tablet Oral DAILY    budesonide (PULMICORT) 500 mcg/2 ml nebulizer suspension  500 mcg Nebulization BID RT    arformoteroL (BROVANA) neb solution 15 mcg  15 mcg Nebulization BID RT    famotidine (PF) (PEPCID) 20 mg in 0.9% sodium chloride 10 mL injection  20 mg IntraVENous Q24H    atorvastatin (LIPITOR) tablet 40 mg  40 mg Oral DAILY    heparin 25,000 units in  mL infusion  11.2-25 Units/kg/hr IntraVENous TITRATE    aspirin chewable tablet 81 mg  81 mg Oral DAILY    allopurinoL (ZYLOPRIM) tablet 100 mg  100 mg Oral DAILY    [Held by provider] hydrALAZINE (APRESOLINE) tablet 10 mg  10 mg Oral TID    nitroglycerin (NITROBID) 2 % ointment 1 Inch  1 Inch Topical BID    carvediloL (COREG) tablet 6.25 mg  6.25 mg Oral BID WITH MEALS         Objective:   Vital Signs:    Visit Vitals  /87   Pulse 72   Temp 97.5 °F (36.4 °C)   Resp 13   Ht 5' 7\" (1.702 m)   Wt 91.2 kg (201 lb 1.6 oz)   SpO2 98%   BMI 31.50 kg/m²       O2 Device: Nasal cannula   O2 Flow Rate (L/min): 2 l/min   Temp (24hrs), Av °F (36.7 °C), Min:97.5 °F (36.4 °C), Max:98.5 °F (36.9 °C)       Intake/Output:   Last shift:       07 -  1900  In: 28.2 [I.V.:28.2]  Out: -     Last 3 shifts:  1901 -  0700  In: 0445 [P.O.:960; I.V.:332]  Out: 700 [Urine:700]      Intake/Output Summary (Last 24 hours) at 2022 3790  Last data filed at 5/23/2022 0800  Gross per 24 hour   Intake 561.5 ml   Output 400 ml   Net 161.5 ml       Last 3 Recorded Weights in this Encounter    05/20/22 1146 05/20/22 1550 05/21/22 2017   Weight: 88.5 kg (195 lb 1.6 oz) 88.5 kg (195 lb) 91.2 kg (201 lb 1.6 oz)       Physical Exam:     Comfortable; on 2 lits nc; acyanotic  HEENT: pupils not dilated, reactive, no scleral jaundice, moist oral mucosa, no nasal drainage  Neck: No adenopathy or thyroid swelling  CVS: S1S2 no murmurs; JVD not elevated  RS: Mod air entry bilaterally, decreased BS at right base; no wheezes, mild basal crackles, not tachypneic or in distress  Abd: soft, non tender, no hepatosplenomegaly, no abd distension, no guarding or rigidity, bowel sounds heard  Neuro: non focal, awake, alert  Extrm: no leg edema or swelling or clubbing  Skin: no rash  Lymphatic: no cervical or supraclavicular adenopathy  Psych: Mildly anxious      Data:       Recent Results (from the past 12 hour(s))   CBC WITH AUTOMATED DIFF    Collection Time: 05/23/22  5:50 AM   Result Value Ref Range    WBC 5.0 4.6 - 13.2 K/uL    RBC 3.52 (L) 4.20 - 5.30 M/uL    HGB 10.0 (L) 12.0 - 16.0 g/dL    HCT 32.5 (L) 35.0 - 45.0 %    MCV 92.3 78.0 - 100.0 FL    MCH 28.4 24.0 - 34.0 PG    MCHC 30.8 (L) 31.0 - 37.0 g/dL    RDW 15.9 (H) 11.6 - 14.5 %    PLATELET 106 109 - 113 K/uL    MPV 12.5 (H) 9.2 - 11.8 FL    NRBC 0.4 (H) 0  WBC    ABSOLUTE NRBC 0.02 (H) 0.00 - 0.01 K/uL    NEUTROPHILS 76 (H) 40 - 73 %    LYMPHOCYTES 12 (L) 21 - 52 %    MONOCYTES 11 (H) 3 - 10 %    EOSINOPHILS 1 0 - 5 %    BASOPHILS 0 0 - 2 %    IMMATURE GRANULOCYTES 0 0.0 - 0.5 %    ABS. NEUTROPHILS 3.8 1.8 - 8.0 K/UL    ABS. LYMPHOCYTES 0.6 (L) 0.9 - 3.6 K/UL    ABS. MONOCYTES 0.6 0.05 - 1.2 K/UL    ABS. EOSINOPHILS 0.0 0.0 - 0.4 K/UL    ABS. BASOPHILS 0.0 0.0 - 0.1 K/UL    ABS. IMM.  GRANS. 0.0 0.00 - 0.04 K/UL    DF AUTOMATED     METABOLIC PANEL, BASIC    Collection Time: 05/23/22  5:50 AM   Result Value Ref Range    Sodium 139 136 - 145 mmol/L    Potassium 3.9 3.5 - 5.5 mmol/L    Chloride 107 100 - 111 mmol/L    CO2 26 21 - 32 mmol/L    Anion gap 6 3.0 - 18 mmol/L    Glucose 89 74 - 99 mg/dL    BUN 66 (H) 7.0 - 18 MG/DL    Creatinine 3.15 (H) 0.6 - 1.3 MG/DL    BUN/Creatinine ratio 21 (H) 12 - 20      GFR est AA 18 (L) >60 ml/min/1.73m2    GFR est non-AA 15 (L) >60 ml/min/1.73m2    Calcium 8.9 8.5 - 10.1 MG/DL   PTT    Collection Time: 05/23/22  5:50 AM   Result Value Ref Range    aPTT 88.0 (H) 23.0 - 36.4 SEC   MAGNESIUM    Collection Time: 05/23/22  5:50 AM   Result Value Ref Range    Magnesium 2.3 1.6 - 2.6 mg/dL           Chemistry Recent Labs     05/23/22  0550 05/22/22  0146 05/21/22  0542 05/20/22  1250 05/20/22  1250   GLU 89 106* 93   < > 105*    138 143   < > 144   K 3.9 4.1 4.1   < > 3.4*    106 111   < > 109   CO2 26 26 26   < > 27   BUN 66* 60* 50*   < > 46*   CREA 3.15* 3.44* 2.86*   < > 2.70*   CA 8.9 8.4* 8.6   < > 9.4   MG 2.3  --   --   --   --    AGAP 6 6 6   < > 8   BUCR 21* 17 17   < > 17   AP  --   --  79  --  87   TP  --   --  6.6  --  6.5   ALB  --   --  3.1*  --  3.3*   GLOB  --   --  3.5  --  3.2   AGRAT  --   --  0.9  --  1.0    < > = values in this interval not displayed. Lactic Acid Lactic acid   Date Value Ref Range Status   01/14/2022 0.9 0.4 - 2.0 MMOL/L Final     No results for input(s): LAC in the last 72 hours. Liver Enzymes Protein, total   Date Value Ref Range Status   05/21/2022 6.6 6.4 - 8.2 g/dL Final     Albumin   Date Value Ref Range Status   05/21/2022 3.1 (L) 3.4 - 5.0 g/dL Final     Globulin   Date Value Ref Range Status   05/21/2022 3.5 2.0 - 4.0 g/dL Final     A-G Ratio   Date Value Ref Range Status   05/21/2022 0.9 0.8 - 1.7   Final     Alk.  phosphatase   Date Value Ref Range Status   05/21/2022 79 45 - 117 U/L Final     Recent Labs     05/21/22  0542 05/20/22  1250   TP 6.6 6.5   ALB 3.1* 3.3*   GLOB 3.5 3.2   AGRAT 0.9 1.0   AP 79 87 CBC w/Diff Recent Labs     05/23/22  0550 05/22/22  0146 05/21/22  0542   WBC 5.0 6.0 6.9   RBC 3.52* 3.66* 3.84*   HGB 10.0* 10.2* 10.7*   HCT 32.5* 34.4* 36.5    170 193   GRANS 76* 75* 80*   LYMPH 12* 14* 11*   EOS 1 1 0        Cardiac Enzymes No results found for: CPK, CK, CKMMB, CKMB, RCK3, CKMBT, CKNDX, CKND1, BEBA, TROPT, TROIQ, MARILUZ, TROPT, TNIPOC, BNP, BNPP     BNP No results found for: BNP, BNPP, XBNPT     Coagulation Recent Labs     05/23/22  0550 05/22/22  1543 05/22/22  0927 05/20/22  2140 05/20/22  1145   PTP  --   --   --   --  14.7   INR  --   --   --   --  1.1   APTT 88.0* 79.2* 71.8*   < > 30.7    < > = values in this interval not displayed. Thyroid  No results found for: T4, T3U, TSH, TSHEXT, TSHEXT    No results found for: T4     Urinalysis Lab Results   Component Value Date/Time    Color YELLOW 05/20/2022 12:00 PM    Appearance CLEAR 05/20/2022 12:00 PM    Specific gravity 1.017 05/20/2022 12:00 PM    pH (UA) 5.0 05/20/2022 12:00 PM    Protein 100 (A) 05/20/2022 12:00 PM    Glucose Negative 05/20/2022 12:00 PM    Ketone TRACE (A) 05/20/2022 12:00 PM    Bilirubin Negative 05/20/2022 12:00 PM    Urobilinogen 1.0 05/20/2022 12:00 PM    Nitrites Negative 05/20/2022 12:00 PM    Leukocyte Esterase SMALL (A) 05/20/2022 12:00 PM    Epithelial cells 1+ 05/20/2022 12:00 PM    Bacteria 1+ (A) 05/20/2022 12:00 PM    WBC 11 to 20 05/20/2022 12:00 PM    RBC Negative 05/20/2022 12:00 PM          Culture data during this hospitalization. All Micro Results     None           ECHO ADULT COMPLETE  Result Date: 5/20/2022    Left Ventricle: Severely reduced left ventricular systolic function with a visually estimated EF of 15 - 20%. Left ventricle is mildly dilated. Severe septal thickening. Moderate posterior thickening. Severe global hypokinesis present. Grade III diastolic dysfunction with increased LAP.   Right Ventricle: Right ventricle is mildly dilated.  Moderately reduced systolic function.   Aortic Valve: Tricuspid valve. Mildly thickened left, right and noncoronary cusps. Moderate annular calcification.   Mitral Valve: Findings consistent with rheumatic disease. Mildly thickened leaflet, at the anterior and posterior leaflets. Mild annular calcification at the posterior leaflet of the mitral valve. Moderate paravalvular regurgitation. Mild to moderate stenosis noted.   Tricuspid Valve: Mildly thickened leaflets.   Pericardium: There is evidence of epicardial fat. Trivial circumferential pericardial effusion present. No indication of cardiac tamponade.   Technical qualifiers: Echo study was technically difficult due to patient's body habitus.   Contrast used: Definity. Images report reviewed by me:  CT 1/13/2022 (Most Recent)  Results from [facility] Encounter encounter on 01/13/22    CTA CHEST W OR W WO CONT    Narrative  EXAM: CTA Chest    CLINICAL INDICATION/HISTORY: Shortness of breath, Covid positive. Possible PE.    COMPARISON: Plain film chest same day    TECHNIQUE: Axial CT imaging from the thoracic inlet through the diaphragm with  intravenous contrast utilizing CTA study for pulmonary artery evaluation. Coronal and sagittal MIP reformations were generated at a separate workstation. One or more dose reduction techniques were used on this CT: automated exposure  control, adjustment of the mAs and/or kVp according to patient's size, and  iterative reconstruction techniques. The specific techniques utilized on this CT  exam have been documented in the patient's electronic medical record. Digital  imaging and communications and medicine (DICOM) format image data are available  to nonaffiliated external healthcare facilities or entities on a secure, media  free, reciprocally searchable basis with patient authorization for at least a 12  month period after this study. _______________    FINDINGS:    EXAM QUALITY: Overall exam quality is adequate.  Pulmonary arterial enhancement  is adequate. The breath hold is satisfactory. Respiratory motion artifact is  present, limiting evaluation of peripheral vessels. PULMONARY ARTERIES: No convincing evidence of pulmonary embolism. MEDIASTINUM: Mild diffuse cardiomegaly with coronary artery calcifications with  no pericardial effusion. Mild atherosclerotic changes and ectasia thoracic  aorta. LYMPH NODES: Borderline enlarged mediastinal lymph nodes maximally 12 mm AP  window short axis dimension. AIRWAY: Unremarkable. LUNGS: Mild partial atelectasis bilateral lower lobes and mild small peripheral  areas of groundglass opacity bilateral upper lobes as well as small bandlike  atelectasis or scarring right upper lobe. No abnormal mass. PLEURA: No pleural effusion or pneumothorax. UPPER ABDOMEN: Reflux of contrast into the IVC and hepatic veins. Several  nonobstructing right renal calculi the largest right lower pole maximally 12 mm. Several small nonobstructing left renal calculi. .    OTHER: No acute or aggressive osseous abnormalities identified. _______________    Impression  1. Mildly limited study due to motion obscuring peripheral vessels but no  definite evidence of pulmonary embolism. 2. Mild areas of bilateral lower lobe and partial right upper lobe atelectasis. Very mild peripheral nonspecific groundglass opacities bilateral upper lobes  which can be related to small airway disease, edema or atypical infection. 3. Cardiomegaly with reflux of contrast into IVC suggesting right heart failure. 4. Incidental bilateral nonobstructing renal calculi. CXR reviewed by me:  XR 5/23 (Most Recent). Results from Hospital Encounter encounter on 05/20/22    XR CHEST PORT    Narrative  EXAM: One-view chest    CLINICAL HISTORY: Congestive heart failure ,    COMPARISON: None    FINDINGS:    Frontal view of the chest demonstrate moderately prominent central vascular  markings. . Cardiac silhouette is normal in size and contour. No acute bony or  soft tissue abnormality. Impression  No significant interval change. Mildly prominent central vascular markings again  noted. Otherwise fairly clear           Please note: Voice-recognition software may have been used to generate this report, which may have resulted in some phonetic-based errors in grammar and contents. Even though attempts were made to correct all the mistakes, some may have been missed, and remained in the body of the document.       Jurgen Li MD  5/23/2022

## 2022-05-23 NOTE — PROGRESS NOTES
Hospitalist Progress Note    Patient: Gerald Garces MRN: [id number]  CSN: [id number]    YOB: 1955  Age: 74 y.o. Sex: female    DOA: 5/20/2022 LOS:  LOS: 2 days                Assessment/Plan     Patient Active Problem List   Diagnosis Code    Atherosclerosis of coronary artery I25.10    Essential hypertension I10    Malignant neoplasm of female breast ([email or web address] C50.919    Mild intermittent asthma without complication N26.71    Moderate malnutrition (HCC) E44.0    S/P bilateral mastectomy Z90.13    Stage 4 chronic kidney disease (HCC) N18.4    NSTEMI (non-ST elevated myocardial infarction) ([email or web address] R87.0    Systolic CHF, chronic (HCC) I50.22        Chief complaint :  Chest pain  71-year-old female with extensive medical history, congestive heart failure, chronic kidney disease, coronary disease with stents, hypertension presents to ER with concerns of chest pain. NSTEMI -  On heparin drip  On aspirin, lipitor, coreg, nitro paste. Cardiology following. Plan for cath per cardiology  Discussed with Dr. Khoi Liao,   Recommend transfer to ICU. Acute on chronic systolic CHF -  One dose of lasix    HTN -  BP on lower side,   Hold hydralazine    CKD stage 4 -  Monitor renal function  Nephrology consulted. History of gout  Continue with allopurinol. Disposition : TBD    Review of systems  General: No fevers or chills. Cardiovascular: see above. Pulmonary: + orthopnea. Gastrointestinal: No nausea, vomiting. Physical Exam:  General: Awake, cooperative, no acute distress    HEENT: NC, Atraumatic. PERRLA, anicteric sclerae. Lungs: CTA Bilaterally. No Wheezing/Rhonchi/Rales. Heart:  S1 S2,  No murmur, No Rubs, No Gallops  Abdomen: Soft, Non distended, Non tender.  +Bowel sounds,   Extremities: No c/c/e  Psych:   Not anxious or agitated. Neurologic:  No acute neurological deficit.            Vital signs/Intake and Output:  Visit Vitals  /68 (BP 1 Location: Right leg, BP Patient Position: At rest)   Pulse 74   Temp 97.7 °F (36.5 °C)   Resp 20   Ht 5' 7\" (1.702 m)   Wt 91.2 kg (201 lb 1.6 oz)   SpO2 99%   BMI 31.50 kg/m²     Current Shift:  No intake/output data recorded. Last three shifts:  05/21 0701 - 05/22 1900  In: 1118.7 [P.O.:960; I.V.:158.7]  Out: 300 [Urine:300]            Labs: Results:       Chemistry Recent Labs     05/22/22  0146 05/21/22  0542 05/20/22  1250   * 93 105*    143 144   K 4.1 4.1 3.4*    111 109   CO2 26 26 27   BUN 60* 50* 46*   CREA 3.44* 2.86* 2.70*   CA 8.4* 8.6 9.4   AGAP 6 6 8   BUCR 17 17 17   AP  --  79 87   TP  --  6.6 6.5   ALB  --  3.1* 3.3*   GLOB  --  3.5 3.2   AGRAT  --  0.9 1.0      CBC w/Diff Recent Labs     05/22/22  0146 05/21/22  0542 05/20/22  1145   WBC 6.0 6.9 6.2   RBC 3.66* 3.84* 3.95*   HGB 10.2* 10.7* 11.1*   HCT 34.4* 36.5 36.4    193 192   GRANS 75* 80* 79*   LYMPH 14* 11* 12*   EOS 1 0 1      Cardiac Enzymes Recent Labs     05/21/22  0542 05/20/22  1250   CPK 1,711* 2,763*      Coagulation Recent Labs     05/22/22  1543 05/22/22  0927 05/20/22  2140 05/20/22  1145   PTP  --   --   --  14.7   INR  --   --   --  1.1   APTT 79.2* 71.8*   < > 30.7    < > = values in this interval not displayed. Lipid Panel No results found for: CHOL, CHOLPOCT, CHOLX, CHLST, CHOLV, 111611, HDL, HDLP, LDL, LDLC, DLDLP, 906289, VLDLC, VLDL, TGLX, TRIGL, TRIGP, TGLPOCT, CHHD, CHHDX   BNP No results for input(s): BNPP in the last 72 hours.    Liver Enzymes Recent Labs     05/21/22  0542   TP 6.6   ALB 3.1*   AP 79      Thyroid Studies No results found for: T4, T3U, TSH, TSHEXT, TSHEXT     Procedures/imaging: see electronic medical records for all procedures/Xrays and details which were not copied into this note but were reviewed prior to creation of Plan

## 2022-05-23 NOTE — ROUTINE PROCESS
1900: Bedside and Verbal shift change report given to OSWALDO Mahoney RN (oncoming nurse) by Christianna Severin, RN (offgoing nurse). Report included the following information SBAR, Kardex, ED Summary, Intake/Output, MAR, Recent Results, Med Rec Status and Cardiac Rhythm NSR.     2000: Shift assessment completed per flow sheet. A/O X4; denies pain; comfortably resting; will continue to monitor. 0000: Reassessment per flow sheet without change. 0400: Reassessment per flow sheet without change.

## 2022-05-23 NOTE — PROGRESS NOTES
Cardiology Progress Note        Patient: Brissa Scott        Sex: female          DOA: 5/20/2022  YOB: 1955      Age:  74 y.o.        LOS:  LOS: 3 days      Patient seen and examined, chart reviewed. Assessment/Plan     Patient Active Problem List   Diagnosis Code    Atherosclerosis of coronary artery I25.10    Essential hypertension I10    Malignant neoplasm of female breast ([facility] [address] C50.919    Mild intermittent asthma without complication O35.20    Moderate malnutrition ([facility]) E44.0    S/P bilateral mastectomy Z90.13    Stage 4 chronic kidney disease ([facility]) N18.4    NSTEMI (non-ST elevated myocardial infarction) ([facility] [address] M77.7    Systolic CHF, chronic ([facility]) I50.22    Elevated diaphragm J98.6    Ischemic cardiomyopathy I25.5    Chronic respiratory failure with hypoxia ([facility]) J96.11    Generalized anxiety disorder F41.1      Echocardiogram reported      Left Ventricle: Severely reduced left ventricular systolic function with a visually estimated EF of 15 - 20%. Left ventricle is mildly dilated. Severe septal thickening. Moderate posterior thickening. Severe global hypokinesis present. Grade III diastolic dysfunction with increased LAP.   Right Ventricle: Right ventricle is mildly dilated. Moderately reduced systolic function.   Aortic Valve: Tricuspid valve. Mildly thickened left, right and noncoronary cusps. Moderate annular calcification.   Mitral Valve: Findings consistent with rheumatic disease. Mildly thickened leaflet, at the anterior and posterior leaflets. Mild annular calcification at the posterior leaflet of the mitral valve. Moderate paravalvular regurgitation. Mild to moderate stenosis noted.   Tricuspid Valve: Mildly thickened leaflets.   Pericardium: There is evidence of epicardial fat. Trivial circumferential pericardial effusion present. No indication of cardiac tamponade.     Technical qualifiers: Echo study was technically difficult due to patient's body habitus.   Contrast used: Definity.     PCI was done on 01/06/2015    1. Coronary angiography showed two vessel disease in a right dominant system. 2. Left ventriculography was deferred; reduced EF (45-50%) by non-invasive methods. 3. Successful PCI of the mid LAD using a 3.0x16mm [place] CHELA and the proximal LCx using a 2.35i82xo [place] CHELA. Plan:    Continue aspirin, carvedilol, nitropaste and atorvastatin. Continue IV heparin. Continue diuretics as per nephrologist  Strict input output  Monitor renal function and electrolytes  Due to worsening of renal function will hold of cardiac catheterization. Subjective:    cc:   denies any chest pain, complaining of orthopnea      REVIEW OF SYSTEMS:     General: No fevers or chills. Cardiovascular: No chest pain,No palpitations,  positive orthopnea, No PND, No leg swelling, No claudication  Pulmonary: No   Dyspnea. Gastrointestinal: No nausea, vomiting, bleeding  Neurology: No Dizziness    Objective:      Visit Vitals  BP (!) 146/92   Pulse 77   Temp 98.4 °F (36.9 °C)   Resp 18   Ht 5' 7\" (1.702 m)   Wt 91.2 kg (201 lb 1.6 oz)   SpO2 94%   BMI 31.50 kg/m²     Body mass index is 31.5 kg/m². Physical Exam:  General Appearance: Comfortable, not using accessory muscles of respiration. HEENT: DARIN. HEAD: Atraumatic  NECK: No JVD, no thyroidomeglay. CAROTIDS: no bruit  LUNGS: Clear bilaterally. HEART: S1+S2 audible, no murmur, no pericardial rub. ABD: Non-tender, BS Audible    EXT: No edema, and no cyanosis. VASCULAR EXAM: Pulses are intact. PSYCHIATRIC EXAM: Mood is appropriate. MUSCULOSKELETAL: Grossly no joint deformity.   NEUROLOGICAL: AAO times 3, No motor and sensory deficit    Medication:  Current Facility-Administered Medications   Medication Dose Route Frequency    busPIRone (BUSPAR) tablet 5 mg  5 mg Oral TID PRN    metOLazone (ZAROXOLYN) tablet 5 mg  5 mg Oral DAILY    bumetanide (BUMEX) injection 2 mg  2 mg IntraVENous Q12H    senna-docusate (PERICOLACE) 8.6-50 mg per tablet 1 Tablet  1 Tablet Oral DAILY    albuterol-ipratropium (DUO-NEB) 2.5 MG-0.5 MG/3 ML  3 mL Nebulization Q6H PRN    budesonide (PULMICORT) 500 mcg/2 ml nebulizer suspension  500 mcg Nebulization BID RT    famotidine (PF) (PEPCID) 20 mg in 0.9% sodium chloride 10 mL injection  20 mg IntraVENous Q24H    atorvastatin (LIPITOR) tablet 40 mg  40 mg Oral DAILY    heparin 25,000 units in  mL infusion  11.2-25 Units/kg/hr IntraVENous TITRATE    aspirin chewable tablet 81 mg  81 mg Oral DAILY    allopurinoL (ZYLOPRIM) tablet 100 mg  100 mg Oral DAILY    [Held by provider] hydrALAZINE (APRESOLINE) tablet 10 mg  10 mg Oral TID    nitroglycerin (NITROBID) 2 % ointment 1 Inch  1 Inch Topical BID    carvediloL (COREG) tablet 6.25 mg  6.25 mg Oral BID WITH MEALS    acetaminophen (TYLENOL) tablet 650 mg  650 mg Oral Q6H PRN    morphine injection 2 mg  2 mg IntraVENous Q3H PRN    ondansetron (ZOFRAN) injection 4 mg  4 mg IntraVENous Q6H PRN    diphenhydrAMINE (BENADRYL) injection 12.5 mg  12.5 mg IntraVENous Q6H PRN               Lab/Data Reviewed:       Recent Labs     05/23/22  0550 05/22/22  0146 05/21/22  0542   WBC 5.0 6.0 6.9   HGB 10.0* 10.2* 10.7*   HCT 32.5* 34.4* 36.5    170 193     Recent Labs     05/23/22  0550 05/22/22  0146 05/21/22  0542    138 143   K 3.9 4.1 4.1    106 111   CO2 26 26 26   GLU 89 106* 93   BUN 66* 60* 50*   CREA 3.15* 3.44* 2.86*   CA 8.9 8.4* 8.6     35 minutes of critical care time spent in the direct evaluation and treatment of this high risk patient. The reason for providing this level of medical care for this critically ill patient was due a critical illness that impaired one or more vital organ systems such that there was a high probability of imminent or life threatening deterioration in the patient's condition.  This care involved high complexity decision making to assess, manipulate, and support vital system functions, to treat this degree vital organ system failure and to prevent further life threatening deterioration of the patient's condition.       Signed By: Val Wilson MD     May 23, 2022

## 2022-05-23 NOTE — PROGRESS NOTES
Hospitalist Progress Note    Patient: Arash Sandoval MRN: [id number]  CSN: [id number]    YOB: 1955  Age: 74 y.o. Sex: female    DOA: 5/20/2022 LOS:  LOS: 3 days          Chief Complaint:    NSTEMI      Assessment/Plan       77-year-old female with extensive medical history, congestive heart failure, chronic kidney disease, coronary disease with stents, hypertension presents to ER with concerns of chest pain     NSTEMI -  On heparin drip  On aspirin, lipitor, coreg, nitro paste  Cardiology following. Plan for cath per cardiology     Acute on chronic systolic CHF   Lasix PRN     HTN -  BP on lower side  Hold hydralazine     CKD stage 4   Monitor renal function  Nephrology on consult, labs stable     History of gout  Continue with allopurinol    Mild UTI-complete rocephin    Feeling ok this morning  No clinical changes  Awaits further evaluation and investigation per cardiology if renal function allows      Disposition :  Patient Active Problem List   Diagnosis Code    Atherosclerosis of coronary artery I25.10    Essential hypertension I10    Malignant neoplasm of female breast ([facility] [address] C50.919    Mild intermittent asthma without complication T80.50    Moderate malnutrition (HCC) E44.0    S/P bilateral mastectomy Z90.13    Stage 4 chronic kidney disease (HCC) N18.4    NSTEMI (non-ST elevated myocardial infarction) ([facility] [address] O96.2    Systolic CHF, chronic (HCC) I50.22       Subjective:    I feel ok  Denies CP, SOB, palp, nausea, headache    Review of systems:    Constitutional: denies fevers, chills  Respiratory: denies SOB, cough  Cardiovascular: denies chest pain, palpitations  Gastrointestinal: denies nausea, vomiting, diarrhea      Vital signs/Intake and Output:  Visit Vitals  BP (!) 118/57   Pulse 73   Temp 97.5 °F (36.4 °C)   Resp 15   Ht 5' 7\" (1.702 m)   Wt 91.2 kg (201 lb 1.6 oz)   SpO2 97%   BMI 31.50 kg/m²     Current Shift:  No intake/output data recorded.   Last three shifts:  05/21 1901 - 05/23 0700  In: 1292 [P.O.:960; I.V.:332]  Out: 700 [Urine:700]    Exam:    General: Well developed, alert, NAD, OX3  CVS:Regular rate and rhythm, no M/R/G, S1/S2 heard, no thrill  Lungs:Clear to auscultation bilaterally, no wheezes, rhonchi, or rales  Abdomen: Soft, Nontender, No distention  Extremities: No C/C/E, pulses palpable 2+  Neuro:grossly normal , follows commands  Psych:appropriate                Labs: Results:       Chemistry Recent Labs     05/23/22  0550 05/22/22  0146 05/21/22  0542 05/20/22  1250 05/20/22  1250   GLU 89 106* 93   < > 105*    138 143   < > 144   K 3.9 4.1 4.1   < > 3.4*    106 111   < > 109   CO2 26 26 26   < > 27   BUN 66* 60* 50*   < > 46*   CREA 3.15* 3.44* 2.86*   < > 2.70*   CA 8.9 8.4* 8.6   < > 9.4   AGAP 6 6 6   < > 8   BUCR 21* 17 17   < > 17   AP  --   --  79  --  87   TP  --   --  6.6  --  6.5   ALB  --   --  3.1*  --  3.3*   GLOB  --   --  3.5  --  3.2   AGRAT  --   --  0.9  --  1.0    < > = values in this interval not displayed. CBC w/Diff Recent Labs     05/23/22  0550 05/22/22  0146 05/21/22  0542   WBC 5.0 6.0 6.9   RBC 3.52* 3.66* 3.84*   HGB 10.0* 10.2* 10.7*   HCT 32.5* 34.4* 36.5    170 193   GRANS 76* 75* 80*   LYMPH 12* 14* 11*   EOS 1 1 0      Cardiac Enzymes Recent Labs     05/21/22  0542 05/20/22  1250   CPK 1,711* 2,763*      Coagulation Recent Labs     05/23/22  0550 05/22/22  1543 05/20/22  2140 05/20/22  1145   PTP  --   --   --  14.7   INR  --   --   --  1.1   APTT 88.0* 79.2*   < > 30.7    < > = values in this interval not displayed. Lipid Panel No results found for: CHOL, CHOLPOCT, CHOLX, CHLST, CHOLV, 681415, HDL, HDLP, LDL, LDLC, DLDLP, 979344, VLDLC, VLDL, TGLX, TRIGL, TRIGP, TGLPOCT, CHHD, CHHDX   BNP No results for input(s): BNPP in the last 72 hours.    Liver Enzymes Recent Labs     05/21/22  0542   TP 6.6   ALB 3.1*   AP 79      Thyroid Studies No results found for: T4, T3U, TSH, TSHEXT     Procedures/imaging: see electronic medical records for all procedures/Xrays and details which were not copied into this note but were reviewed prior to creation of Sapna Mendez MD No

## 2022-05-23 NOTE — PROGRESS NOTES
Cardiology Progress Note        Patient: Dilan Delacruz        Sex: female          DOA: 5/20/2022  YOB: 1955      Age:  74 y.o.        LOS:  LOS: 2 days      Patient seen and examined, chart reviewed. Assessment/Plan     Patient Active Problem List   Diagnosis Code    Atherosclerosis of coronary artery I25.10    Essential hypertension I10    Malignant neoplasm of female breast ([facility] [address] C50.919    Mild intermittent asthma without complication U15.36    Moderate malnutrition (HCC) E44.0    S/P bilateral mastectomy Z90.13    Stage 4 chronic kidney disease (HCC) N18.4    NSTEMI (non-ST elevated myocardial infarction) ([facility] [address] A22.4    Systolic CHF, chronic ([facility]) I50.22      Echocardiogram reported      Left Ventricle: Severely reduced left ventricular systolic function with a visually estimated EF of 15 - 20%. Left ventricle is mildly dilated. Severe septal thickening. Moderate posterior thickening. Severe global hypokinesis present. Grade III diastolic dysfunction with increased LAP.   Right Ventricle: Right ventricle is mildly dilated. Moderately reduced systolic function.   Aortic Valve: Tricuspid valve. Mildly thickened left, right and noncoronary cusps. Moderate annular calcification.   Mitral Valve: Findings consistent with rheumatic disease. Mildly thickened leaflet, at the anterior and posterior leaflets. Mild annular calcification at the posterior leaflet of the mitral valve. Moderate paravalvular regurgitation. Mild to moderate stenosis noted.   Tricuspid Valve: Mildly thickened leaflets.   Pericardium: There is evidence of epicardial fat. Trivial circumferential pericardial effusion present. No indication of cardiac tamponade.   Technical qualifiers: Echo study was technically difficult due to patient's body habitus.   Contrast used: Definity.     PCI was done on 01/06/2015    1. Coronary angiography showed two vessel disease in a right dominant system. 2. Left ventriculography was deferred; reduced EF (45-50%) by non-invasive methods. 3. Successful PCI of the mid LAD using a 3.0x16mm [place] CHELA and the proximal LCx using a 2.25b51xa [place] CHELA. Plan:    Continue aspirin, carvedilol, nitropaste and atorvastatin. Continue IV heparin. Due to worsening of renal function will hold of cardiac catheterization. One dose of IV Lasix 80 mg. Case discussed with nephrologist.    Case discussed with Dr. Smiley Mckinley  Patient will be transferred to ICU for further management due to complex condition                    Subjective:    cc:   denies any chest pain. She developed right-sided pressure-like chest pain on morning of ER arrival.  Currently she is chest pain-free. She is complaining of orthopnea. REVIEW OF SYSTEMS:     General: No fevers or chills. Cardiovascular: No chest pain,No palpitations,  positive orthopnea, No PND, No leg swelling, No claudication  Pulmonary: No   Dyspnea. Gastrointestinal: No nausea, vomiting, bleeding  Neurology: No Dizziness    Objective:      Visit Vitals  /68 (BP 1 Location: Right leg, BP Patient Position: At rest)   Pulse 74   Temp 97.7 °F (36.5 °C)   Resp 20   Ht 5' 7\" (1.702 m)   Wt 91.2 kg (201 lb 1.6 oz)   SpO2 99%   BMI 31.50 kg/m²     Body mass index is 31.5 kg/m². Physical Exam:  General Appearance: Comfortable, not using accessory muscles of respiration. HEENT: DARIN. HEAD: Atraumatic  NECK: No JVD, no thyroidomeglay. CAROTIDS: no bruit  LUNGS: Clear bilaterally. HEART: S1+S2 audible, no murmur, no pericardial rub. ABD: Non-tender, BS Audible    EXT: No edema, and no cyanosis. VASCULAR EXAM: Pulses are intact. PSYCHIATRIC EXAM: Mood is appropriate. MUSCULOSKELETAL: Grossly no joint deformity.   NEUROLOGICAL: AAO times 3, No motor and sensory deficit    Medication:  Current Facility-Administered Medications   Medication Dose Route Frequency    senna-docusate (PERICOLACE) 8.6-50 mg per tablet 1 Tablet  1 Tablet Oral DAILY    albuterol-ipratropium (DUO-NEB) 2.5 MG-0.5 MG/3 ML  3 mL Nebulization Q6H PRN    budesonide (PULMICORT) 500 mcg/2 ml nebulizer suspension  500 mcg Nebulization BID RT    arformoteroL (BROVANA) neb solution 15 mcg  15 mcg Nebulization BID RT    [START ON 5/23/2022] famotidine (PF) (PEPCID) 20 mg in 0.9% sodium chloride 10 mL injection  20 mg IntraVENous Q24H    atorvastatin (LIPITOR) tablet 40 mg  40 mg Oral DAILY    heparin 25,000 units in  mL infusion  11.2-25 Units/kg/hr IntraVENous TITRATE    aspirin chewable tablet 81 mg  81 mg Oral DAILY    allopurinoL (ZYLOPRIM) tablet 100 mg  100 mg Oral DAILY    [Held by provider] hydrALAZINE (APRESOLINE) tablet 10 mg  10 mg Oral TID    nitroglycerin (NITROBID) 2 % ointment 1 Inch  1 Inch Topical BID    carvediloL (COREG) tablet 6.25 mg  6.25 mg Oral BID WITH MEALS    acetaminophen (TYLENOL) tablet 650 mg  650 mg Oral Q6H PRN    morphine injection 2 mg  2 mg IntraVENous Q3H PRN    ondansetron (ZOFRAN) injection 4 mg  4 mg IntraVENous Q6H PRN    diphenhydrAMINE (BENADRYL) injection 12.5 mg  12.5 mg IntraVENous Q6H PRN               Lab/Data Reviewed:       Recent Labs     05/22/22  0146 05/21/22  0542 05/20/22  1145   WBC 6.0 6.9 6.2   HGB 10.2* 10.7* 11.1*   HCT 34.4* 36.5 36.4    193 192     Recent Labs     05/22/22  0146 05/21/22  0542 05/20/22  1250    143 144   K 4.1 4.1 3.4*    111 109   CO2 26 26 27   * 93 105*   BUN 60* 50* 46*   CREA 3.44* 2.86* 2.70*   CA 8.4* 8.6 9.4     32 minutes of critical care time spent in the direct evaluation and treatment of this high risk patient. The reason for providing this level of medical care for this critically ill patient was due a critical illness that impaired one or more vital organ systems such that there was a high probability of imminent or life threatening deterioration in the patient's condition. This care involved high complexity decision making to assess, manipulate, and support vital system functions, to treat this degree vital organ system failure and to prevent further life threatening deterioration of the patient's condition.       Signed By: Keren Valles MD     May 22, 2022

## 2022-05-24 PROBLEM — G47.00 INSOMNIA: Status: ACTIVE | Noted: 2022-05-24

## 2022-05-24 LAB
ANION GAP SERPL CALC-SCNC: 7 MMOL/L (ref 3–18)
APTT PPP: 109.9 SEC (ref 23–36.4)
ATRIAL RATE: 74 BPM
BASOPHILS # BLD: 0 K/UL (ref 0–0.1)
BASOPHILS NFR BLD: 0 % (ref 0–2)
BUN SERPL-MCNC: 73 MG/DL (ref 7–18)
BUN/CREAT SERPL: 25 (ref 12–20)
CALCIUM SERPL-MCNC: 9 MG/DL (ref 8.5–10.1)
CALCULATED P AXIS, ECG09: 17 DEGREES
CALCULATED R AXIS, ECG10: -28 DEGREES
CALCULATED T AXIS, ECG11: 149 DEGREES
CHLORIDE SERPL-SCNC: 105 MMOL/L (ref 100–111)
CO2 SERPL-SCNC: 30 MMOL/L (ref 21–32)
CREAT SERPL-MCNC: 2.97 MG/DL (ref 0.6–1.3)
DIAGNOSIS, 93000: NORMAL
DIFFERENTIAL METHOD BLD: ABNORMAL
EOSINOPHIL # BLD: 0.1 K/UL (ref 0–0.4)
EOSINOPHIL NFR BLD: 1 % (ref 0–5)
ERYTHROCYTE [DISTWIDTH] IN BLOOD BY AUTOMATED COUNT: 16 % (ref 11.6–14.5)
GLUCOSE SERPL-MCNC: 107 MG/DL (ref 74–99)
HCT VFR BLD AUTO: 33.6 % (ref 35–45)
HGB BLD-MCNC: 10.3 G/DL (ref 12–16)
IMM GRANULOCYTES # BLD AUTO: 0 K/UL (ref 0–0.04)
IMM GRANULOCYTES NFR BLD AUTO: 0 % (ref 0–0.5)
LYMPHOCYTES # BLD: 0.8 K/UL (ref 0.9–3.6)
LYMPHOCYTES NFR BLD: 13 % (ref 21–52)
MCH RBC QN AUTO: 27.8 PG (ref 24–34)
MCHC RBC AUTO-ENTMCNC: 30.7 G/DL (ref 31–37)
MCV RBC AUTO: 90.6 FL (ref 78–100)
MONOCYTES # BLD: 0.6 K/UL (ref 0.05–1.2)
MONOCYTES NFR BLD: 11 % (ref 3–10)
NEUTS SEG # BLD: 4.4 K/UL (ref 1.8–8)
NEUTS SEG NFR BLD: 74 % (ref 40–73)
NRBC # BLD: 0.03 K/UL (ref 0–0.01)
NRBC BLD-RTO: 0.5 PER 100 WBC
P-R INTERVAL, ECG05: 166 MS
PLATELET # BLD AUTO: 204 K/UL (ref 135–420)
PMV BLD AUTO: 12.3 FL (ref 9.2–11.8)
POTASSIUM SERPL-SCNC: 3.7 MMOL/L (ref 3.5–5.5)
Q-T INTERVAL, ECG07: 444 MS
QRS DURATION, ECG06: 126 MS
QTC CALCULATION (BEZET), ECG08: 492 MS
RBC # BLD AUTO: 3.71 M/UL (ref 4.2–5.3)
SODIUM SERPL-SCNC: 142 MMOL/L (ref 136–145)
VENTRICULAR RATE, ECG03: 74 BPM
WBC # BLD AUTO: 5.9 K/UL (ref 4.6–13.2)

## 2022-05-24 PROCEDURE — 74011250637 HC RX REV CODE- 250/637: Performed by: STUDENT IN AN ORGANIZED HEALTH CARE EDUCATION/TRAINING PROGRAM

## 2022-05-24 PROCEDURE — 74011250637 HC RX REV CODE- 250/637: Performed by: HOSPITALIST

## 2022-05-24 PROCEDURE — 74011250636 HC RX REV CODE- 250/636: Performed by: HOSPITALIST

## 2022-05-24 PROCEDURE — 94640 AIRWAY INHALATION TREATMENT: CPT

## 2022-05-24 PROCEDURE — 74011250636 HC RX REV CODE- 250/636: Performed by: EMERGENCY MEDICINE

## 2022-05-24 PROCEDURE — 74011000250 HC RX REV CODE- 250: Performed by: HOSPITALIST

## 2022-05-24 PROCEDURE — 77010033678 HC OXYGEN DAILY

## 2022-05-24 PROCEDURE — 36415 COLL VENOUS BLD VENIPUNCTURE: CPT

## 2022-05-24 PROCEDURE — 74011000250 HC RX REV CODE- 250: Performed by: STUDENT IN AN ORGANIZED HEALTH CARE EDUCATION/TRAINING PROGRAM

## 2022-05-24 PROCEDURE — 65610000006 HC RM INTENSIVE CARE

## 2022-05-24 PROCEDURE — 74011250637 HC RX REV CODE- 250/637: Performed by: INTERNAL MEDICINE

## 2022-05-24 PROCEDURE — 85025 COMPLETE CBC W/AUTO DIFF WBC: CPT

## 2022-05-24 PROCEDURE — 74011000250 HC RX REV CODE- 250: Performed by: INTERNAL MEDICINE

## 2022-05-24 PROCEDURE — 80048 BASIC METABOLIC PNL TOTAL CA: CPT

## 2022-05-24 PROCEDURE — 85730 THROMBOPLASTIN TIME PARTIAL: CPT

## 2022-05-24 RX ORDER — POTASSIUM CHLORIDE 750 MG/1
10 TABLET, EXTENDED RELEASE ORAL ONCE
Status: COMPLETED | OUTPATIENT
Start: 2022-05-24 | End: 2022-05-24

## 2022-05-24 RX ORDER — METOLAZONE 5 MG/1
5 TABLET ORAL 2 TIMES DAILY
Status: DISCONTINUED | OUTPATIENT
Start: 2022-05-24 | End: 2022-05-26 | Stop reason: HOSPADM

## 2022-05-24 RX ORDER — LANOLIN ALCOHOL/MO/W.PET/CERES
3 CREAM (GRAM) TOPICAL
Status: DISCONTINUED | OUTPATIENT
Start: 2022-05-24 | End: 2022-05-26 | Stop reason: HOSPADM

## 2022-05-24 RX ORDER — ZOLPIDEM TARTRATE 5 MG/1
5 TABLET ORAL
Status: DISCONTINUED | OUTPATIENT
Start: 2022-05-24 | End: 2022-05-26 | Stop reason: HOSPADM

## 2022-05-24 RX ADMIN — HEPARIN SODIUM 17.51 UNITS/KG/HR: 5000 INJECTION INTRAVENOUS; SUBCUTANEOUS at 16:58

## 2022-05-24 RX ADMIN — HYDRALAZINE HYDROCHLORIDE 10 MG: 10 TABLET, FILM COATED ORAL at 10:54

## 2022-05-24 RX ADMIN — CARVEDILOL 6.25 MG: 3.12 TABLET, FILM COATED ORAL at 16:06

## 2022-05-24 RX ADMIN — ASPIRIN 81 MG: 81 TABLET, CHEWABLE ORAL at 08:23

## 2022-05-24 RX ADMIN — ALLOPURINOL 100 MG: 100 TABLET ORAL at 08:23

## 2022-05-24 RX ADMIN — BUSPIRONE HYDROCHLORIDE 5 MG: 5 TABLET ORAL at 16:11

## 2022-05-24 RX ADMIN — HEPARIN SODIUM 17.51 UNITS/KG/HR: 5000 INJECTION INTRAVENOUS; SUBCUTANEOUS at 01:00

## 2022-05-24 RX ADMIN — ACETAMINOPHEN 650 MG: 325 TABLET ORAL at 16:11

## 2022-05-24 RX ADMIN — ACETAMINOPHEN 650 MG: 325 TABLET ORAL at 21:11

## 2022-05-24 RX ADMIN — BUDESONIDE 500 MCG: 0.5 INHALANT RESPIRATORY (INHALATION) at 07:36

## 2022-05-24 RX ADMIN — BUMETANIDE 2 MG: 0.25 INJECTION, SOLUTION INTRAMUSCULAR; INTRAVENOUS at 21:09

## 2022-05-24 RX ADMIN — POTASSIUM CHLORIDE 10 MEQ: 10 TABLET, EXTENDED RELEASE ORAL at 11:18

## 2022-05-24 RX ADMIN — FAMOTIDINE 20 MG: 10 INJECTION INTRAVENOUS at 08:22

## 2022-05-24 RX ADMIN — METOLAZONE 5 MG: 5 TABLET ORAL at 08:22

## 2022-05-24 RX ADMIN — Medication 3 MG: at 21:10

## 2022-05-24 RX ADMIN — BUDESONIDE 500 MCG: 0.5 INHALANT RESPIRATORY (INHALATION) at 20:11

## 2022-05-24 RX ADMIN — ATORVASTATIN CALCIUM 40 MG: 20 TABLET, FILM COATED ORAL at 08:22

## 2022-05-24 RX ADMIN — NITROGLYCERIN 1 INCH: 20 OINTMENT TOPICAL at 21:10

## 2022-05-24 RX ADMIN — ZOLPIDEM TARTRATE 5 MG: 5 TABLET ORAL at 21:10

## 2022-05-24 RX ADMIN — BUSPIRONE HYDROCHLORIDE 5 MG: 5 TABLET ORAL at 04:26

## 2022-05-24 RX ADMIN — METOLAZONE 5 MG: 5 TABLET ORAL at 21:10

## 2022-05-24 RX ADMIN — Medication 1 TABLET: at 08:22

## 2022-05-24 RX ADMIN — CARVEDILOL 6.25 MG: 3.12 TABLET, FILM COATED ORAL at 08:23

## 2022-05-24 RX ADMIN — NITROGLYCERIN 1 INCH: 20 OINTMENT TOPICAL at 08:22

## 2022-05-24 RX ADMIN — BUMETANIDE 2 MG: 0.25 INJECTION, SOLUTION INTRAMUSCULAR; INTRAVENOUS at 08:22

## 2022-05-24 NOTE — PROGRESS NOTES
Hospitalist Progress Note    Patient: Nathan Michael MRN: [id number]  CSN: [id number]    YOB: 1955  Age: 74 y.o. Sex: female    DOA: 5/20/2022 LOS:  LOS: 4 days          Chief Complaint:    NSTEMI      Assessment/Plan       82-year-old female with extensive medical history, congestive heart failure, chronic kidney disease, coronary disease with stents, hypertension presents to ER with concerns of chest pain     NSTEMI -  On heparin drip  On aspirin, lipitor, coreg, nitro paste  Cardiology following.   Plan for cath per cardiology, but monitoring renal status for dye load effect on suboptimal renal status      Acute on chronic systolic CHF   Lasix PRN     HTN -  BP on lower side  Hold hydralazine     CKD stage 4   Monitor renal function  Nephrology on consult, labs stable     History of gout  Continue with allopurinol    Mild UTI-complete rocephin    Feeling ok this morning  No clinical changes  Awaits further evaluation and investigation per cardiology if renal function allows      Disposition :  Patient Active Problem List   Diagnosis Code    Atherosclerosis of coronary artery I25.10    Essential hypertension I10    Malignant neoplasm of female breast ([facility] [address] C50.919    Mild intermittent asthma without complication U38.14    Moderate malnutrition ([address] E44.0    S/P bilateral mastectomy Z90.13    Stage 4 chronic kidney disease (HCC) N18.4    NSTEMI (non-ST elevated myocardial infarction) ([facility] [address] D80.9    Systolic CHF, chronic (HCC) I50.22    Elevated diaphragm J98.6    Ischemic cardiomyopathy I25.5    Chronic respiratory failure with hypoxia (HCC) J96.11    Generalized anxiety disorder F41.1       Subjective:    Very very nervous about her heart being weak and about the cath   at bedside  Denies CP, SOB, palp, nausea, headache    Review of systems:    Constitutional: denies fevers, chills  Respiratory: denies SOB, cough  Cardiovascular: denies chest pain, palpitations  Gastrointestinal: denies nausea, vomiting, diarrhea      Vital signs/Intake and Output:  Visit Vitals  BP (!) 149/97   Pulse 74   Temp 97.5 °F (36.4 °C)   Resp 15   Ht 5' 7\" (1.702 m)   Wt 91.2 kg (201 lb 1.6 oz)   SpO2 95%   BMI 31.50 kg/m²     Current Shift:  No intake/output data recorded. Last three shifts:  05/22 1901 - 05/24 0700  In: 725.5 [P.O.:400; I.V.:325.5]  Out: 3250 [Urine:3250]    Exam:    General: Well developed, alert, NAD, OX3  CVS:Regular rate and rhythm, no M/R/G, S1/S2 heard, no thrill  Lungs:Clear to auscultation bilaterally, no wheezes, rhonchi, or rales  Abdomen: Soft, Nontender, No distention  Extremities: No C/C/E, pulses palpable 2+  Neuro:grossly normal , follows commands  Psych:appropriate                Labs: Results:       Chemistry Recent Labs     05/24/22 0425 05/23/22  0550 05/22/22  0146   * 89 106*    139 138   K 3.7 3.9 4.1    107 106   CO2 30 26 26   BUN 73* 66* 60*   CREA 2.97* 3.15* 3.44*   CA 9.0 8.9 8.4*   AGAP 7 6 6   BUCR 25* 21* 17      CBC w/Diff Recent Labs     05/24/22 0425 05/23/22  0550 05/22/22  0146   WBC 5.9 5.0 6.0   RBC 3.71* 3.52* 3.66*   HGB 10.3* 10.0* 10.2*   HCT 33.6* 32.5* 34.4*    159 170   GRANS 74* 76* 75*   LYMPH 13* 12* 14*   EOS 1 1 1      Cardiac Enzymes No results for input(s): CPK, CKND1, BEBA in the last 72 hours. No lab exists for component: CKRMB, TROIP   Coagulation Recent Labs     05/24/22 0425 05/23/22  0550   APTT 109.9* 88.0*       Lipid Panel No results found for: CHOL, CHOLPOCT, CHOLX, CHLST, CHOLV, 661288, HDL, HDLP, LDL, LDLC, DLDLP, 851307, VLDLC, VLDL, TGLX, TRIGL, TRIGP, TGLPOCT, CHHD, CHHDX   BNP No results for input(s): BNPP in the last 72 hours. Liver Enzymes No results for input(s): TP, ALB, TBIL, AP in the last 72 hours.     No lab exists for component: SGOT, GPT, DBIL   Thyroid Studies No results found for: T4, T3U, TSH, TSHEXT, TSHEXT     Procedures/imaging: see electronic medical records for all procedures/Xrays and details which were not copied into this note but were reviewed prior to creation of Finn Medina MD

## 2022-05-24 NOTE — PROGRESS NOTES
conducted an initial consultation and spiritual assessment for Hany Plaza, who is a 74 y.o.,female. Patient is a bit anxious today. Stated that she needs a procedure but her blood pressure needs to go down first.   We prayed for peace and comfort. Patient has a Baptism she is connected to. Daughter is local and a good support, along with her two sisters. She has two sons also. The reason the Patient came to the hospital is:   Patient Active Problem List    Diagnosis Date Noted    Elevated diaphragm 05/23/2022    Ischemic cardiomyopathy 05/23/2022    Chronic respiratory failure with hypoxia ([facility] [address] 05/23/2022    Generalized anxiety disorder 05/23/2022    NSTEMI (non-ST elevated myocardial infarction) ([facility] [address] 57/19/9335    Systolic CHF, chronic ([facility] [address] 05/20/2022    Moderate malnutrition ([facility] [address] 01/06/2021    Mild intermittent asthma without complication 84/77/3210    Malignant neoplasm of female breast ([facility] [address] 11/24/2014    S/P bilateral mastectomy 05/02/2014    Stage 4 chronic kidney disease ([facility] [address] 12/12/2013    Atherosclerosis of coronary artery 11/06/2013    Essential hypertension 11/06/2013        Initiated a relationship of care and support. Explored issues of samantha, belief, spirituality and Worship/ritual needs. Listened empathically. Provided information about Spiritual Care Services. Offered prayer and assurance of continued prayers on patients behalf. Chart reviewed. Patient shared information about her medical narrative, spiritual journey and beliefs.  confirmed Patient's Yazidi Affiliation. Patient processed feelings about current hospitalization. Patient expressed gratitude for Spiritual Care visit. Chaplains will continue to follow and will provide pastoral care as needed or requested.  recommends bedside caregivers page  on duty if patient shows signs of acute spiritual or emotional distress. [address] [facility] Institute[address] [address] [facility] Hospital[facility], M.Div.   Board Certified   [phone] - Office

## 2022-05-24 NOTE — PROGRESS NOTES
Cardiology Progress Note        Patient: Renee Wilkinson        Sex: female          DOA: 5/20/2022  YOB: 1955      Age:  74 y.o.        LOS:  LOS: 4 days      Patient seen and examined, chart reviewed. Assessment/Plan     Patient Active Problem List   Diagnosis Code    Atherosclerosis of coronary artery I25.10    Essential hypertension I10    Malignant neoplasm of female breast ([facility] [address] C50.919    Mild intermittent asthma without complication C24.16    Moderate malnutrition ([facility]) E44.0    S/P bilateral mastectomy Z90.13    Stage 4 chronic kidney disease ([facility]) N18.4    NSTEMI (non-ST elevated myocardial infarction) ([facility] [address] M67.6    Systolic CHF, chronic ([facility]) I50.22    Elevated diaphragm J98.6    Ischemic cardiomyopathy I25.5    Chronic respiratory failure with hypoxia ([facility]) J96.11    Generalized anxiety disorder F41.1    Insomnia G47.00      NSTEMI  Acute on chronic systolic heart failure    Echocardiogram reported      Left Ventricle: Severely reduced left ventricular systolic function with a visually estimated EF of 15 - 20%. Left ventricle is mildly dilated. Severe septal thickening. Moderate posterior thickening. Severe global hypokinesis present. Grade III diastolic dysfunction with increased LAP.   Right Ventricle: Right ventricle is mildly dilated. Moderately reduced systolic function.   Aortic Valve: Tricuspid valve. Mildly thickened left, right and noncoronary cusps. Moderate annular calcification.   Mitral Valve: Findings consistent with rheumatic disease. Mildly thickened leaflet, at the anterior and posterior leaflets. Mild annular calcification at the posterior leaflet of the mitral valve. Moderate paravalvular regurgitation. Mild to moderate stenosis noted.   Tricuspid Valve: Mildly thickened leaflets.   Pericardium: There is evidence of epicardial fat. Trivial circumferential pericardial effusion present.  No indication of cardiac tamponade.   Technical qualifiers: Echo study was technically difficult due to patient's body habitus.   Contrast used: Definity.     PCI was done on 01/06/2015    1. Coronary angiography showed two vessel disease in a right dominant system. 2. Left ventriculography was deferred; reduced EF (45-50%) by non-invasive methods. 3. Successful PCI of the mid LAD using a 3.0x16mm [place] CHELA and the proximal LCx using a 2.99x38fp [place] CHELA. Plan:    Continue aspirin, carvedilol, nitropaste and atorvastatin. Continue IV heparin. Continue diuretics as per nephrologist  Strict input output  Monitor renal function and electrolytes  In view of non-STEMI and worsening of systolic heart failure advised about left heart catheterization, right heart catheterization, coronary angiogram plus or minus PCI. All risks, benefits and alternatives explained to patient and she verbally understood. Discussed with patient about risk of cardiac catheterization including not limited are hematoma, retroperitoneal bleed, pseudoaneurysm, AV fistula, dissection, thrombosis and embolism, radial artery occlusion, myocardial infarction, CVA, TIA, dissection and perforation of great vessels, cardiac perforation, tamponade, atheroembolism, allergy reaction, infection, acute renal failure, arrhythmias, radiation injury, congestive heart failure, respiratory failure, multiorgan failure, death. Patient agreed for procedure. Will review renal function tomorrow prior to put her on schedule  Plan discussed with patient's nurse                Subjective:    cc:   denies any chest pain,  My breathing is little better today      REVIEW OF SYSTEMS:     General: No fevers or chills. Cardiovascular: No chest pain,No palpitations,  positive orthopnea, No PND, No leg swelling, No claudication  Pulmonary: No   Dyspnea.    Gastrointestinal: No nausea, vomiting, bleeding  Neurology: No Dizziness    Objective:      Visit Vitals  BP (!) 146/81   Pulse 67   Temp 97.8 °F (36.6 °C)   Resp 20   Ht 5' 7\" (1.702 m)   Wt 91.2 kg (201 lb 1.6 oz)   SpO2 96%   BMI 31.50 kg/m²     Body mass index is 31.5 kg/m². Physical Exam:  General Appearance: Comfortable, not using accessory muscles of respiration. HEENT: DARIN. HEAD: Atraumatic  NECK: No JVD, no thyroidomeglay. CAROTIDS: no bruit  LUNGS: Clear bilaterally. HEART: S1+S2 audible, no murmur, no pericardial rub. ABD: Non-tender, BS Audible    EXT: No edema, and no cyanosis. VASCULAR EXAM: Pulses are intact. PSYCHIATRIC EXAM: Mood is appropriate. MUSCULOSKELETAL: Grossly no joint deformity.   NEUROLOGICAL: AAO times 3, No motor and sensory deficit    Medication:  Current Facility-Administered Medications   Medication Dose Route Frequency    zolpidem (AMBIEN) tablet 5 mg  5 mg Oral QHS PRN    melatonin tablet 3 mg  3 mg Oral QHS    metOLazone (ZAROXOLYN) tablet 5 mg  5 mg Oral BID    busPIRone (BUSPAR) tablet 5 mg  5 mg Oral TID PRN    bumetanide (BUMEX) injection 2 mg  2 mg IntraVENous Q12H    senna-docusate (PERICOLACE) 8.6-50 mg per tablet 1 Tablet  1 Tablet Oral DAILY    albuterol-ipratropium (DUO-NEB) 2.5 MG-0.5 MG/3 ML  3 mL Nebulization Q6H PRN    budesonide (PULMICORT) 500 mcg/2 ml nebulizer suspension  500 mcg Nebulization BID RT    famotidine (PF) (PEPCID) 20 mg in 0.9% sodium chloride 10 mL injection  20 mg IntraVENous Q24H    atorvastatin (LIPITOR) tablet 40 mg  40 mg Oral DAILY    heparin 25,000 units in  mL infusion  11.2-25 Units/kg/hr IntraVENous TITRATE    aspirin chewable tablet 81 mg  81 mg Oral DAILY    allopurinoL (ZYLOPRIM) tablet 100 mg  100 mg Oral DAILY    [Held by provider] hydrALAZINE (APRESOLINE) tablet 10 mg  10 mg Oral TID    nitroglycerin (NITROBID) 2 % ointment 1 Inch  1 Inch Topical BID    carvediloL (COREG) tablet 6.25 mg  6.25 mg Oral BID WITH MEALS    acetaminophen (TYLENOL) tablet 650 mg  650 mg Oral Q6H PRN    morphine injection 2 mg  2 mg IntraVENous Q3H PRN    ondansetron (ZOFRAN) injection 4 mg  4 mg IntraVENous Q6H PRN    diphenhydrAMINE (BENADRYL) injection 12.5 mg  12.5 mg IntraVENous Q6H PRN               Lab/Data Reviewed:       Recent Labs     05/24/22  0425 05/23/22  0550 05/22/22  0146   WBC 5.9 5.0 6.0   HGB 10.3* 10.0* 10.2*   HCT 33.6* 32.5* 34.4*    159 170     Recent Labs     05/24/22  0425 05/23/22  0550 05/22/22  0146    139 138   K 3.7 3.9 4.1    107 106   CO2 30 26 26   * 89 106*   BUN 73* 66* 60*   CREA 2.97* 3.15* 3.44*   CA 9.0 8.9 8.4*     37 minutes of critical care time spent in the direct evaluation and treatment of this high risk patient. The reason for providing this level of medical care for this critically ill patient was due a critical illness that impaired one or more vital organ systems such that there was a high probability of imminent or life threatening deterioration in the patient's condition. This care involved high complexity decision making to assess, manipulate, and support vital system functions, to treat this degree vital organ system failure and to prevent further life threatening deterioration of the patient's condition.       Signed By: Molly Levy MD     May 24, 2022

## 2022-05-24 NOTE — PROGRESS NOTES
CM spoke with patient at bedside. Patient confirmed her contact info and that she lives with her daughter, whom she wants to be her primary decision maker. The patient states that she has a walker and a cane at home. Please consider ordering therapy when appropriate to assist with discharge planning needs. Care Management Interventions  PCP Verified by CM:  Yes  Last Visit to PCP: 04/01/22  Transition of Care Consult (CM Consult): Discharge Planning  Discharge Durable Medical Equipment: No (has walker and cane at home)  Physical Therapy Consult: No  Occupational Therapy Consult: No  Support Systems: Child(cuco)  Confirm Follow Up Transport: Family  The Plan for Transition of Care is Related to the Following Treatment Goals :  (anticipate [facility] upon discharge)  Discharge Location  Patient Expects to be Discharged to[facility]>  (anticipate home with [facility])

## 2022-05-24 NOTE — PROGRESS NOTES
[facility]  Pulmonary, Critical Care, and Sleep Medicine    Name: Edgard Mcdowell MRN: [id number]   : 1955 Hospital: [facility] [place]    Date: 2022  Room: [address] [place] [address] Note                                              Consult requesting physician: Dr. Cris Kirby  Reason for Consult: ICU monitoring for non-STEMI, acute on chronic systolic CHF    IMPRESSION:     -Non-ST MI  -Chronic respiratory failure with hypoxemia  - Ischemic cardiomyopathy  - Coronary artery disease  - Asthma  -Generalized anxiety  - Insomnia      Active Hospital Problems    Diagnosis Date Noted    Insomnia 2022    Elevated diaphragm 2022    Ischemic cardiomyopathy 2022    Chronic respiratory failure with hypoxia ([address] 2022    Generalized anxiety disorder 2022    NSTEMI (non-ST elevated myocardial infarction) ([address]     Systolic CHF, chronic ([address] 2022    Mild intermittent asthma without complication     S/P bilateral mastectomy 2014    Stage 4 chronic kidney disease ([address] 2013    Atherosclerosis of coronary artery 2013    Essential hypertension 2013   ·    Patient Active Problem List   Diagnosis Code    Atherosclerosis of coronary artery I25.10    Essential hypertension I10    Malignant neoplasm of female breast ([address] C50.919    Mild intermittent asthma without complication S94.19    Moderate malnutrition ([address] E44.0    S/P bilateral mastectomy Z90.13    Stage 4 chronic kidney disease ([address] N18.4    NSTEMI (non-ST elevated myocardial infarction) ([address] M40.5    Systolic CHF, chronic (HCC) I50.22    Elevated diaphragm J98.6    Ischemic cardiomyopathy I25.5    Chronic respiratory failure with hypoxia (formerly [facility]) J96.11    Generalized anxiety disorder F41.1     · Code status: Full Code       RECOMMENDATIONS:     Respiratory: Patient respirations remain stable; patient on nasal cannula oxygen at 2 L per  Chest x-ray shows chronic right diaphragm elevation, seen on prior chest CT as well, with basal atelectasis. Bronchodilators- continue budesonide nebulizer twice daily; avoid LABA inhaler for now. Keep SPO2 >=92%. HOB 30 degree elevation all the time. Aspiration precautions. Incentive spirometry. CVS: Stable hemodynamics; patient not needing pressors; telemetry-sinus rhythm. Heparin infusion per ACS protocol. Cardiac medications-aspirin, statin, Bumex, carvedilol, metolazone, hydralazine, nitroglycerin patch. Cardiology following patient; cardiac cath decisions based on renal function. Echocardiogram- cardiomyopathy with EF 71-38%; grade 3 diastolic dysfunction; no pulmonary hypertension reported. ID: No active infection; continue to monitor. Hematology/Oncology: Stable hemoglobin and platelets; continue to monitor. Mild anemia; no active bleeding issues. Monitor PTT while on heparin drip. Renal: Monitor renal function; creatinine 2.97; good urine output with diuretics. Potassium stable. Avoid nephrotoxins; nephrology on case. GI/: Oral diet as tolerated  Pepcid for GI prophylaxis    Endocrine: Monitor BS with am labs. Neurology: Normal mentation; insomnia symptoms. BuSpar 5 mg 3 times a day as needed-home dose. Melatonin 3 mg at bedtime; Ambien 5 mg at bedtime as needed. Skin/Wound: As per nursing care protocol    Electrolytes: Replace electrolytes per ICU electrolyte replacement protocol. IVF: Currently, no maintenance IV fluids. Nutrition: Oral diet. Prophylaxis: DVT Prophylaxis: Heparin. GI Prophylaxis: Famotidine. Restraints: none needed    Lines/Tubes: PIVs  External urinary catheter 5/23    Advance Directive/Palliative Care: Full code. Patient does not need palliative care consultation at this time. Quality Care: PPI, DVT prophylaxis, HOB elevated, Infection control all reviewed and addressed.     Care of plan d/w RN and hospitalist  Wen. Discussed with patient and updated management plans in detail. Await cardiology to round and see if patient stable to transfer out of ICU to telemetry unit. High complexity decision making was performed during the evaluation of this patient at high risk for decompensation with multiple organ involvement. Total critical care time spent rendering care exclusive of procedures/family discussion/coordination of care: 35 minutes. Subjective/History of Present Illness:     Patient is a 74 y.o. female with PMHx significant for asthma, chronic systolic CHF, CKD, CAD, breast cancer status post double mastectomy, dysarthria, gout presented to [facility] ER with complaint of chest pressure like pain that started night prior to presentation, progressively increase without radiation and associated with orthopnea, shortness of breath symptoms. In ER, labs showed elevated troponin while EKG did not show any ST elevation. Patient started on heparin. Cardiology consulted and admitted to the floor. Her echo during hospital course showed worsening in her LVEF and labs showed worsening in S. Cr.  Subsequently, cardiology recommended monitoring in ICU, and patient was moved to ICU on 5/22/2022.    5/24/2022   Patient remains in ICU. No worsening shortness of breath or cough or wheezing. No chest pain or palpitations. Patient reports not able to sleep at nighttime. Telemetry-sinus rhythm. Blood pressure- hypertensive. Urine output- 2.8 L yesterday. She denies smoking. Patient on home O2 since March 2022 following hospital discharge; denies prior history of COPD or SUMIT.       Review of Systems:   -No fever or chills  - No chest pain or palpitations  - No vomiting or diarrhea  - No cough or hemoptysis  - No bleeding symptoms      Allergies   Allergen Reactions    Sulfa (Sulfonamide Antibiotics) Anxiety      Past Medical History:   Diagnosis Date    CHF (congestive heart failure) (HCC)     Hypertension       No past surgical history on file. Social History     Tobacco Use    Smoking status: Never Smoker    Smokeless tobacco: Never Used   Substance Use Topics    Alcohol use: Not Currently      No family history on file. Prior to Admission medications    Medication Sig Start Date End Date Taking? Authorizing Provider   furosemide (LASIX) 40 mg tablet Take 40 mg by mouth daily. Yes Provider, Historical   hydrALAZINE (APRESOLINE) 10 mg tablet Take 10 mg by mouth three (3) times daily. Yes Provider, Historical   busPIRone (BUSPAR) 5 mg tablet Take 5 mg by mouth three (3) times daily as needed. Indications: repeated episodes of anxiety   Yes Provider, Historical   carvediloL (COREG) 6.25 mg tablet Take 6.25 mg by mouth two (2) times a day.  10/22/21   Other, MD Sayda     Current Facility-Administered Medications   Medication Dose Route Frequency    metOLazone (ZAROXOLYN) tablet 5 mg  5 mg Oral DAILY    bumetanide (BUMEX) injection 2 mg  2 mg IntraVENous Q12H    senna-docusate (PERICOLACE) 8.6-50 mg per tablet 1 Tablet  1 Tablet Oral DAILY    budesonide (PULMICORT) 500 mcg/2 ml nebulizer suspension  500 mcg Nebulization BID RT    famotidine (PF) (PEPCID) 20 mg in 0.9% sodium chloride 10 mL injection  20 mg IntraVENous Q24H    atorvastatin (LIPITOR) tablet 40 mg  40 mg Oral DAILY    heparin 25,000 units in  mL infusion  11.2-25 Units/kg/hr IntraVENous TITRATE    aspirin chewable tablet 81 mg  81 mg Oral DAILY    allopurinoL (ZYLOPRIM) tablet 100 mg  100 mg Oral DAILY    hydrALAZINE (APRESOLINE) tablet 10 mg  10 mg Oral TID    nitroglycerin (NITROBID) 2 % ointment 1 Inch  1 Inch Topical BID    carvediloL (COREG) tablet 6.25 mg  6.25 mg Oral BID WITH MEALS         Objective:   Vital Signs:    Visit Vitals  BP (!) 149/97   Pulse 74   Temp 97.5 °F (36.4 °C)   Resp 15   Ht 5' 7\" (1.702 m)   Wt 91.2 kg (201 lb 1.6 oz)   SpO2 95%   BMI 31.50 kg/m²       O2 Device: Nasal cannula   O2 Flow Rate (L/min): 2 l/min   Temp (24hrs), Av °F (36.7 °C), Min:97.5 °F (36.4 °C), Max:98.4 °F (36.9 °C)       Intake/Output:   Last shift:       07 - 1900  In: -   Out: 1200 [Urine:1200]    Last 3 shifts: 1901 -  07  In: 725.5 [P.O.:400;  I.V.:325.5]  Out: 3250 [Urine:3250]      Intake/Output Summary (Last 24 hours) at 2022 1146  Last data filed at 2022 1100  Gross per 24 hour   Intake 524 ml   Output 3750 ml   Net -3226 ml       Last 3 Recorded Weights in this Encounter    22 1146 22 1550 22   Weight: 88.5 kg (195 lb 1.6 oz) 88.5 kg (195 lb) 91.2 kg (201 lb 1.6 oz)       Physical Exam:     Comfortable; on 2 lits nc; acyanotic  HEENT: pupils not dilated, reactive, no scleral jaundice, moist oral mucosa, no nasal drainage  Neck: No adenopathy or thyroid swelling  CVS: Normal heart sounds; S1 and S2 with no murmur; telemetry-sinus rhythm; JVD not elevated  RS: Moderate air entry bilaterally; mild bibasal crackles; no wheezing; decreased breath sounds right base; not tachypneic or in distress  Abd: Soft, no tenderness, no distention, no guarding or rigidity, no hepatosplenomegaly, bowel sounds present  Neuro: non focal, awake, alert  Extrm: no leg edema or swelling or clubbing  Skin: no rash  Lymphatic: no cervical or supraclavicular adenopathy  Psych: Cooperative      Data:       Recent Results (from the past 12 hour(s))   CBC WITH AUTOMATED DIFF    Collection Time: 22  4:25 AM   Result Value Ref Range    WBC 5.9 4.6 - 13.2 K/uL    RBC 3.71 (L) 4.20 - 5.30 M/uL    HGB 10.3 (L) 12.0 - 16.0 g/dL    HCT 33.6 (L) 35.0 - 45.0 %    MCV 90.6 78.0 - 100.0 FL    MCH 27.8 24.0 - 34.0 PG    MCHC 30.7 (L) 31.0 - 37.0 g/dL    RDW 16.0 (H) 11.6 - 14.5 %    PLATELET 999 034 - 250 K/uL    MPV 12.3 (H) 9.2 - 11.8 FL    NRBC 0.5 (H) 0  WBC    ABSOLUTE NRBC 0.03 (H) 0.00 - 0.01 K/uL    NEUTROPHILS 74 (H) 40 - 73 %    LYMPHOCYTES 13 (L) 21 - 52 %    MONOCYTES 11 (H) 3 - 10 %    EOSINOPHILS 1 0 - 5 %    BASOPHILS 0 0 - 2 %    IMMATURE GRANULOCYTES 0 0.0 - 0.5 %    ABS. NEUTROPHILS 4.4 1.8 - 8.0 K/UL    ABS. LYMPHOCYTES 0.8 (L) 0.9 - 3.6 K/UL    ABS. MONOCYTES 0.6 0.05 - 1.2 K/UL    ABS. EOSINOPHILS 0.1 0.0 - 0.4 K/UL    ABS. BASOPHILS 0.0 0.0 - 0.1 K/UL    ABS. IMM. GRANS. 0.0 0.00 - 0.04 K/UL    DF AUTOMATED     METABOLIC PANEL, BASIC    Collection Time: 05/24/22  4:25 AM   Result Value Ref Range    Sodium 142 136 - 145 mmol/L    Potassium 3.7 3.5 - 5.5 mmol/L    Chloride 105 100 - 111 mmol/L    CO2 30 21 - 32 mmol/L    Anion gap 7 3.0 - 18 mmol/L    Glucose 107 (H) 74 - 99 mg/dL    BUN 73 (H) 7.0 - 18 MG/DL    Creatinine 2.97 (H) 0.6 - 1.3 MG/DL    BUN/Creatinine ratio 25 (H) 12 - 20      GFR est AA 19 (L) >60 ml/min/1.73m2    GFR est non-AA 16 (L) >60 ml/min/1.73m2    Calcium 9.0 8.5 - 10.1 MG/DL   PTT    Collection Time: 05/24/22  4:25 AM   Result Value Ref Range    aPTT 109.9 (H) 23.0 - 36.4 SEC           Chemistry Recent Labs     05/24/22  0425 05/23/22  0550 05/22/22  0146   * 89 106*    139 138   K 3.7 3.9 4.1    107 106   CO2 30 26 26   BUN 73* 66* 60*   CREA 2.97* 3.15* 3.44*   CA 9.0 8.9 8.4*   MG  --  2.3  --    AGAP 7 6 6   BUCR 25* 21* 17        Lactic Acid Lactic acid   Date Value Ref Range Status   01/14/2022 0.9 0.4 - 2.0 MMOL/L Final     No results for input(s): LAC in the last 72 hours. Liver Enzymes Protein, total   Date Value Ref Range Status   05/21/2022 6.6 6.4 - 8.2 g/dL Final     Albumin   Date Value Ref Range Status   05/21/2022 3.1 (L) 3.4 - 5.0 g/dL Final     Globulin   Date Value Ref Range Status   05/21/2022 3.5 2.0 - 4.0 g/dL Final     A-G Ratio   Date Value Ref Range Status   05/21/2022 0.9 0.8 - 1.7   Final     Alk. phosphatase   Date Value Ref Range Status   05/21/2022 79 45 - 117 U/L Final     No results for input(s): TP, ALB, GLOB, AGRAT, AP, TBIL in the last 72 hours.     No lab exists for component: SGOT, GPT, DBIL CBC w/Diff Recent Labs     05/24/22  0425 05/23/22  0550 05/22/22  0146   WBC 5.9 5.0 6.0   RBC 3.71* 3.52* 3.66*   HGB 10.3* 10.0* 10.2*   HCT 33.6* 32.5* 34.4*    159 170   GRANS 74* 76* 75*   LYMPH 13* 12* 14*   EOS 1 1 1        Cardiac Enzymes No results found for: CPK, CK, CKMMB, CKMB, RCK3, CKMBT, CKNDX, CKND1, BEBA, TROPT, TROIQ, MARILUZ, TROPT, TNIPOC, BNP, BNPP     BNP No results found for: BNP, BNPP, XBNPT     Coagulation Recent Labs     05/24/22 0425 05/23/22  0550 05/22/22  1543   APTT 109.9* 88.0* 79.2*         Thyroid  No results found for: T4, T3U, TSH, TSHEXT, TSHEXT    No results found for: T4     Urinalysis Lab Results   Component Value Date/Time    Color YELLOW 05/20/2022 12:00 PM    Appearance CLEAR 05/20/2022 12:00 PM    Specific gravity 1.017 05/20/2022 12:00 PM    pH (UA) 5.0 05/20/2022 12:00 PM    Protein 100 (A) 05/20/2022 12:00 PM    Glucose Negative 05/20/2022 12:00 PM    Ketone TRACE (A) 05/20/2022 12:00 PM    Bilirubin Negative 05/20/2022 12:00 PM    Urobilinogen 1.0 05/20/2022 12:00 PM    Nitrites Negative 05/20/2022 12:00 PM    Leukocyte Esterase SMALL (A) 05/20/2022 12:00 PM    Epithelial cells 1+ 05/20/2022 12:00 PM    Bacteria 1+ (A) 05/20/2022 12:00 PM    WBC 11 to 20 05/20/2022 12:00 PM    RBC Negative 05/20/2022 12:00 PM          Culture data during this hospitalization. All Micro Results     None           ECHO ADULT COMPLETE  Result Date: 5/20/2022    Left Ventricle: Severely reduced left ventricular systolic function with a visually estimated EF of 15 - 20%. Left ventricle is mildly dilated. Severe septal thickening. Moderate posterior thickening. Severe global hypokinesis present. Grade III diastolic dysfunction with increased LAP.   Right Ventricle: Right ventricle is mildly dilated. Moderately reduced systolic function.   Aortic Valve: Tricuspid valve. Mildly thickened left, right and noncoronary cusps. Moderate annular calcification.    Mitral Valve: Findings consistent with rheumatic disease. Mildly thickened leaflet, at the anterior and posterior leaflets. Mild annular calcification at the posterior leaflet of the mitral valve. Moderate paravalvular regurgitation. Mild to moderate stenosis noted.   Tricuspid Valve: Mildly thickened leaflets.   Pericardium: There is evidence of epicardial fat. Trivial circumferential pericardial effusion present. No indication of cardiac tamponade.   Technical qualifiers: Echo study was technically difficult due to patient's body habitus.   Contrast used: Definity. Images report reviewed by me:  CT 1/13/2022 (Most Recent)  Results from [facility] on [place]/13/22    CTA CHEST W OR W WO CONT    Narrative  EXAM: CTA Chest    CLINICAL INDICATION/HISTORY: Shortness of breath, Covid positive. Possible PE.    COMPARISON: Plain film chest same day    TECHNIQUE: Axial CT imaging from the thoracic inlet through the diaphragm with  intravenous contrast utilizing CTA study for pulmonary artery evaluation. Coronal and sagittal MIP reformations were generated at a separate workstation. One or more dose reduction techniques were used on this CT: automated exposure  control, adjustment of the mAs and/or kVp according to patient's size, and  iterative reconstruction techniques. The specific techniques utilized on this CT  exam have been documented in the patient's electronic medical record. Digital  imaging and communications and medicine (DICOM) format image data are available  to nonaffiliated external healthcare facilities or entities on a secure, media  free, reciprocally searchable basis with patient authorization for at least a 12  month period after this study. _______________    FINDINGS:    EXAM QUALITY: Overall exam quality is adequate. Pulmonary arterial enhancement  is adequate. The breath hold is satisfactory. Respiratory motion artifact is  present, limiting evaluation of peripheral vessels.     PULMONARY ARTERIES: No convincing evidence of pulmonary embolism. MEDIASTINUM: Mild diffuse cardiomegaly with coronary artery calcifications with  no pericardial effusion. Mild atherosclerotic changes and ectasia thoracic  aorta. LYMPH NODES: Borderline enlarged mediastinal lymph nodes maximally 12 mm AP  window short axis dimension. AIRWAY: Unremarkable. LUNGS: Mild partial atelectasis bilateral lower lobes and mild small peripheral  areas of groundglass opacity bilateral upper lobes as well as small bandlike  atelectasis or scarring right upper lobe. No abnormal mass. PLEURA: No pleural effusion or pneumothorax. UPPER ABDOMEN: Reflux of contrast into the IVC and hepatic veins. Several  nonobstructing right renal calculi the largest right lower pole maximally 12 mm. Several small nonobstructing left renal calculi. .    OTHER: No acute or aggressive osseous abnormalities identified. _______________    Impression  1. Mildly limited study due to motion obscuring peripheral vessels but no  definite evidence of pulmonary embolism. 2. Mild areas of bilateral lower lobe and partial right upper lobe atelectasis. Very mild peripheral nonspecific groundglass opacities bilateral upper lobes  which can be related to small airway disease, edema or atypical infection. 3. Cardiomegaly with reflux of contrast into IVC suggesting right heart failure. 4. Incidental bilateral nonobstructing renal calculi. CXR reviewed by me:  XR 5/23 (Most Recent). Results from Hospital Encounter encounter on 05/20/22    XR CHEST PORT    Narrative  EXAM: One-view chest    CLINICAL HISTORY: Congestive heart failure ,    COMPARISON: None    FINDINGS:    Frontal view of the chest demonstrate moderately prominent central vascular  markings. . Cardiac silhouette is normal in size and contour. No acute bony or  soft tissue abnormality. Impression  No significant interval change.  Mildly prominent central vascular markings again  noted. Otherwise fairly clear           Please note: Voice-recognition software may have been used to generate this report, which may have resulted in some phonetic-based errors in grammar and contents. Even though attempts were made to correct all the mistakes, some may have been missed, and remained in the body of the document.       Allegra Pearce MD  5/24/2022

## 2022-05-24 NOTE — PROGRESS NOTES
CM notes patient remains in ICU on 2L NC, CM notes patient uses 2-3 L  O2 @ home delivered by Rudy Blank; patient is on continuous heparin drip after being admitted through the ED for chest pain, CM notes per cardiology notes, plan for cardiac cath has been placed on hold for now due to worsening renal function, and nephro notes states that dialysis is not currently indicated but may be considered if volume status does not improve with diuretics or renal fxn continues to deteriorate. CM to continue to monitor and remain available.      Care Management Interventions  Support Systems: Child(cuco)  Discharge Location  Patient Expects to be Discharged to[de-identified] Home

## 2022-05-24 NOTE — PROGRESS NOTES
RENAL CONSULT PROGRESS NOTE   2022    Patient: Jose Gomez  :  1955  Gender:  female  MRN #:  [id number]    Reason for Consult: Acute renal failure on CKD       Assessment:  Jose Gomez is a 74y.o. year old female with h/o CKD stage G4 , congestive heart failure, coronary artery disease with stents, hypertension presented with chest pain and SOB. Has elevated BNAP and troponin consistent with NSTEMI, on heparin drip now . Uses oxygen at home for CHF . She developed acute kidney injury on CKD with creatinine elevation from 2.86 mg/dl to 3.44 mg/dl in 24 hours on     She has been seen by cardiology , ECHO showed EF 15-20 % , grade 3 diastolic CHF, still on oxygen . # Acute renal failure- Most likely due to cardiac decompensation   #CKD stage 4   # HTN  # Acute CHF  #NSTEMI     Subjective:   She thinks SOB is little better , still has when lying flat    BP stable, UPO is 2850 cc overnight   No chest pain and nausea     Plan:    - Patient examined and labs reviewed . Decent urine output overnight. Renal functions is little better. Still has symptoms of fluid overload   - continue Iv bumex 2 mg BID and increase metolazone to 5 mg BID   - She does not have clinical indications of dialysis yet.  I discussed the possibility of dialysis  if volume status does not improve with diuretics or renal functions  deteriorate   - Further work up and treatment per cardiology   - Bladder scan daily to ensure she is not retaining  - -Intake and output recording   - Avoid contrast and nephrotoxin   - consider low dose contrast if she were to get cardiac cath   - Dose all  meds for current eGFR   - Coreg for HTN        Intake/Output Summary (Last 24 hours) at 2022 1500  Last data filed at 2022 1200  Gross per 24 hour   Intake 722 ml   Output 3950 ml   Net -3228 ml                 Visit Vitals  /70   Pulse 75   Temp 97.4 °F (36.3 °C)   Resp 19   Ht 5' 7\" (1.702 m)   Wt 91.2 kg (201 lb 1.6 oz) SpO2 97%   BMI 31.50 kg/m²       Physical Exam:    Pt awake,  alert and comfortable  HEENT:  no neck swelling  Lung: Fine crackles at lung vase   Heart: s1s2 regualr no rubs or murmur  Abdomen: soft, non tender, no guarding  Ext: trace edema   CNS- Oriented to time , place and person     Laboratory Data:    Lab Results   Component Value Date    BUN 73 (H) 05/24/2022    BUN 66 (H) 05/23/2022    BUN 60 (H) 05/22/2022     05/24/2022     05/23/2022     05/22/2022    CO2 30 05/24/2022    CO2 26 05/23/2022    CO2 26 05/22/2022     Lab Results   Component Value Date    WBC 5.9 05/24/2022    HGB 10.3 (L) 05/24/2022    HCT 33.6 (L) 05/24/2022     No components found for: CALCIUM, PHOSPHORUS, MAGNESIUM  No results found for: HDL  No results found for: SPECIMENTYP, TURBIDITY, UGLU    Imaging Reveiwed:    CXR reviwed       discussed with the patient and ICU team     Approx 30   minutes of critical care time spent in the direct evaluation and formulation of treatment plan of this high risk patient. The reason for providing this level of medical care was due a critical illness that impaired one or more vital organ systems such that there was a high probability of imminent or life threatening deterioration in the patients condition. This care involved high complexity decision making to assess, manipulate, and support vital system functions, to treat this degreee vital organ system failure and to prevent further life threatening deterioration of the patients condition.          Jeaneth Carpenter MD, MD  [facility].- Nephrology

## 2022-05-25 LAB
ALBUMIN SERPL-MCNC: 3 G/DL (ref 3.4–5)
ALBUMIN/GLOB SERPL: 0.9 {RATIO} (ref 0.8–1.7)
ALP SERPL-CCNC: 119 U/L (ref 45–117)
ALT SERPL-CCNC: 171 U/L (ref 13–56)
ANION GAP SERPL CALC-SCNC: 10 MMOL/L (ref 3–18)
APTT PPP: 115.2 SEC (ref 23–36.4)
APTT PPP: 115.5 SEC (ref 23–36.4)
AST SERPL-CCNC: 131 U/L (ref 10–38)
BASOPHILS # BLD: 0 K/UL (ref 0–0.1)
BASOPHILS NFR BLD: 0 % (ref 0–2)
BILIRUB SERPL-MCNC: 0.6 MG/DL (ref 0.2–1)
BUN SERPL-MCNC: 68 MG/DL (ref 7–18)
BUN/CREAT SERPL: 25 (ref 12–20)
CALCIUM SERPL-MCNC: 9.1 MG/DL (ref 8.5–10.1)
CHLORIDE SERPL-SCNC: 101 MMOL/L (ref 100–111)
CO2 SERPL-SCNC: 29 MMOL/L (ref 21–32)
CREAT SERPL-MCNC: 2.7 MG/DL (ref 0.6–1.3)
DIFFERENTIAL METHOD BLD: ABNORMAL
EOSINOPHIL # BLD: 0.1 K/UL (ref 0–0.4)
EOSINOPHIL NFR BLD: 2 % (ref 0–5)
ERYTHROCYTE [DISTWIDTH] IN BLOOD BY AUTOMATED COUNT: 15.9 % (ref 11.6–14.5)
GLOBULIN SER CALC-MCNC: 3.5 G/DL (ref 2–4)
GLUCOSE SERPL-MCNC: 98 MG/DL (ref 74–99)
HCT VFR BLD AUTO: 34.5 % (ref 35–45)
HGB BLD-MCNC: 10.7 G/DL (ref 12–16)
IMM GRANULOCYTES # BLD AUTO: 0 K/UL (ref 0–0.04)
IMM GRANULOCYTES NFR BLD AUTO: 1 % (ref 0–0.5)
INR PPP: 1.1 (ref 0.8–1.2)
LYMPHOCYTES # BLD: 0.8 K/UL (ref 0.9–3.6)
LYMPHOCYTES NFR BLD: 15 % (ref 21–52)
MCH RBC QN AUTO: 27.9 PG (ref 24–34)
MCHC RBC AUTO-ENTMCNC: 31 G/DL (ref 31–37)
MCV RBC AUTO: 90.1 FL (ref 78–100)
MONOCYTES # BLD: 0.6 K/UL (ref 0.05–1.2)
MONOCYTES NFR BLD: 12 % (ref 3–10)
NEUTS SEG # BLD: 3.8 K/UL (ref 1.8–8)
NEUTS SEG NFR BLD: 71 % (ref 40–73)
NRBC # BLD: 0 K/UL (ref 0–0.01)
NRBC BLD-RTO: 0 PER 100 WBC
PLATELET # BLD AUTO: 190 K/UL (ref 135–420)
PMV BLD AUTO: 12.2 FL (ref 9.2–11.8)
POTASSIUM SERPL-SCNC: 3.5 MMOL/L (ref 3.5–5.5)
PROT SERPL-MCNC: 6.5 G/DL (ref 6.4–8.2)
PROTHROMBIN TIME: 14.7 SEC (ref 11.5–15.2)
RBC # BLD AUTO: 3.83 M/UL (ref 4.2–5.3)
SODIUM SERPL-SCNC: 140 MMOL/L (ref 136–145)
WBC # BLD AUTO: 5.4 K/UL (ref 4.6–13.2)

## 2022-05-25 PROCEDURE — 77030008543 HC TBNG MON PRSS MRTM -A: Performed by: INTERNAL MEDICINE

## 2022-05-25 PROCEDURE — 74011250636 HC RX REV CODE- 250/636: Performed by: EMERGENCY MEDICINE

## 2022-05-25 PROCEDURE — 74011250637 HC RX REV CODE- 250/637: Performed by: HOSPITALIST

## 2022-05-25 PROCEDURE — 74011250636 HC RX REV CODE- 250/636: Performed by: INTERNAL MEDICINE

## 2022-05-25 PROCEDURE — 77010033678 HC OXYGEN DAILY

## 2022-05-25 PROCEDURE — 94640 AIRWAY INHALATION TREATMENT: CPT

## 2022-05-25 PROCEDURE — 85730 THROMBOPLASTIN TIME PARTIAL: CPT

## 2022-05-25 PROCEDURE — C1769 GUIDE WIRE: HCPCS | Performed by: INTERNAL MEDICINE

## 2022-05-25 PROCEDURE — 99153 MOD SED SAME PHYS/QHP EA: CPT | Performed by: INTERNAL MEDICINE

## 2022-05-25 PROCEDURE — 74011250637 HC RX REV CODE- 250/637: Performed by: INTERNAL MEDICINE

## 2022-05-25 PROCEDURE — 99152 MOD SED SAME PHYS/QHP 5/>YRS: CPT | Performed by: INTERNAL MEDICINE

## 2022-05-25 PROCEDURE — 74011250636 HC RX REV CODE- 250/636: Performed by: HOSPITALIST

## 2022-05-25 PROCEDURE — 77030013797 HC KT TRNSDUC PRSSR EDWD -A: Performed by: INTERNAL MEDICINE

## 2022-05-25 PROCEDURE — 77030004522 HC CATH ANGI DX EXPO BSC -A: Performed by: INTERNAL MEDICINE

## 2022-05-25 PROCEDURE — 80053 COMPREHEN METABOLIC PANEL: CPT

## 2022-05-25 PROCEDURE — 77030016699 HC CATH ANGI DX INFN1 CARD -A: Performed by: INTERNAL MEDICINE

## 2022-05-25 PROCEDURE — 74011000250 HC RX REV CODE- 250: Performed by: STUDENT IN AN ORGANIZED HEALTH CARE EDUCATION/TRAINING PROGRAM

## 2022-05-25 PROCEDURE — B2111ZZ FLUOROSCOPY OF MULTIPLE CORONARY ARTERIES USING LOW OSMOLAR CONTRAST: ICD-10-PCS | Performed by: INTERNAL MEDICINE

## 2022-05-25 PROCEDURE — C1894 INTRO/SHEATH, NON-LASER: HCPCS | Performed by: INTERNAL MEDICINE

## 2022-05-25 PROCEDURE — 77030010221 HC SPLNT WR POS TELE -B: Performed by: INTERNAL MEDICINE

## 2022-05-25 PROCEDURE — 74011250637 HC RX REV CODE- 250/637: Performed by: STUDENT IN AN ORGANIZED HEALTH CARE EDUCATION/TRAINING PROGRAM

## 2022-05-25 PROCEDURE — 85610 PROTHROMBIN TIME: CPT

## 2022-05-25 PROCEDURE — 74011000250 HC RX REV CODE- 250: Performed by: INTERNAL MEDICINE

## 2022-05-25 PROCEDURE — 36415 COLL VENOUS BLD VENIPUNCTURE: CPT

## 2022-05-25 PROCEDURE — 77030004521 HC CATH ANGI DX COOK -B: Performed by: INTERNAL MEDICINE

## 2022-05-25 PROCEDURE — 85025 COMPLETE CBC W/AUTO DIFF WBC: CPT

## 2022-05-25 PROCEDURE — 93458 L HRT ARTERY/VENTRICLE ANGIO: CPT | Performed by: INTERNAL MEDICINE

## 2022-05-25 PROCEDURE — 74011000250 HC RX REV CODE- 250: Performed by: HOSPITALIST

## 2022-05-25 PROCEDURE — 65610000006 HC RM INTENSIVE CARE

## 2022-05-25 PROCEDURE — 4A023N7 MEASUREMENT OF CARDIAC SAMPLING AND PRESSURE, LEFT HEART, PERCUTANEOUS APPROACH: ICD-10-PCS | Performed by: INTERNAL MEDICINE

## 2022-05-25 PROCEDURE — 74011000636 HC RX REV CODE- 636: Performed by: INTERNAL MEDICINE

## 2022-05-25 RX ORDER — SODIUM CHLORIDE 0.9 % (FLUSH) 0.9 %
5-40 SYRINGE (ML) INJECTION AS NEEDED
Status: DISCONTINUED | OUTPATIENT
Start: 2022-05-25 | End: 2022-05-26 | Stop reason: HOSPADM

## 2022-05-25 RX ORDER — POTASSIUM CHLORIDE 7.45 MG/ML
10 INJECTION INTRAVENOUS
Status: COMPLETED | OUTPATIENT
Start: 2022-05-25 | End: 2022-05-25

## 2022-05-25 RX ORDER — HEPARIN SODIUM 200 [USP'U]/100ML
INJECTION, SOLUTION INTRAVENOUS
Status: COMPLETED | OUTPATIENT
Start: 2022-05-25 | End: 2022-05-25

## 2022-05-25 RX ORDER — ASPIRIN 81 MG/1
81 TABLET ORAL DAILY
Status: DISCONTINUED | OUTPATIENT
Start: 2022-05-26 | End: 2022-05-26 | Stop reason: HOSPADM

## 2022-05-25 RX ORDER — LIDOCAINE HYDROCHLORIDE 10 MG/ML
INJECTION INFILTRATION; PERINEURAL AS NEEDED
Status: DISCONTINUED | OUTPATIENT
Start: 2022-05-25 | End: 2022-05-25 | Stop reason: HOSPADM

## 2022-05-25 RX ORDER — VERAPAMIL HYDROCHLORIDE 2.5 MG/ML
INJECTION, SOLUTION INTRAVENOUS AS NEEDED
Status: DISCONTINUED | OUTPATIENT
Start: 2022-05-25 | End: 2022-05-25 | Stop reason: HOSPADM

## 2022-05-25 RX ORDER — NITROGLYCERIN 40 MG/100ML
0-20 INJECTION INTRAVENOUS
Status: DISCONTINUED | OUTPATIENT
Start: 2022-05-25 | End: 2022-05-26 | Stop reason: HOSPADM

## 2022-05-25 RX ORDER — HEPARIN SODIUM 1000 [USP'U]/ML
INJECTION, SOLUTION INTRAVENOUS; SUBCUTANEOUS AS NEEDED
Status: DISCONTINUED | OUTPATIENT
Start: 2022-05-25 | End: 2022-05-25 | Stop reason: HOSPADM

## 2022-05-25 RX ORDER — FENTANYL CITRATE 50 UG/ML
INJECTION, SOLUTION INTRAMUSCULAR; INTRAVENOUS AS NEEDED
Status: DISCONTINUED | OUTPATIENT
Start: 2022-05-25 | End: 2022-05-25 | Stop reason: HOSPADM

## 2022-05-25 RX ORDER — SODIUM CHLORIDE 0.9 % (FLUSH) 0.9 %
5-40 SYRINGE (ML) INJECTION EVERY 8 HOURS
Status: DISCONTINUED | OUTPATIENT
Start: 2022-05-25 | End: 2022-05-26 | Stop reason: HOSPADM

## 2022-05-25 RX ADMIN — NITROGLYCERIN 20 MCG/MIN: 40 INJECTION INTRAVENOUS at 18:31

## 2022-05-25 RX ADMIN — FAMOTIDINE 20 MG: 10 INJECTION INTRAVENOUS at 08:41

## 2022-05-25 RX ADMIN — SODIUM CHLORIDE, PRESERVATIVE FREE 10 ML: 5 INJECTION INTRAVENOUS at 21:30

## 2022-05-25 RX ADMIN — POTASSIUM CHLORIDE 10 MEQ: 7.46 INJECTION, SOLUTION INTRAVENOUS at 13:49

## 2022-05-25 RX ADMIN — Medication 1 TABLET: at 08:42

## 2022-05-25 RX ADMIN — CARVEDILOL 6.25 MG: 3.12 TABLET, FILM COATED ORAL at 08:42

## 2022-05-25 RX ADMIN — NITROGLYCERIN 5 MCG/MIN: 40 INJECTION INTRAVENOUS at 16:29

## 2022-05-25 RX ADMIN — BUDESONIDE 500 MCG: 0.5 INHALANT RESPIRATORY (INHALATION) at 07:16

## 2022-05-25 RX ADMIN — HEPARIN SODIUM 16.5 UNITS/KG/HR: 5000 INJECTION INTRAVENOUS; SUBCUTANEOUS at 12:38

## 2022-05-25 RX ADMIN — METOLAZONE 5 MG: 5 TABLET ORAL at 21:28

## 2022-05-25 RX ADMIN — ATORVASTATIN CALCIUM 40 MG: 20 TABLET, FILM COATED ORAL at 08:42

## 2022-05-25 RX ADMIN — NITROGLYCERIN 1 INCH: 20 OINTMENT TOPICAL at 08:43

## 2022-05-25 RX ADMIN — Medication 3 MG: at 21:28

## 2022-05-25 RX ADMIN — BUMETANIDE 2 MG: 0.25 INJECTION, SOLUTION INTRAMUSCULAR; INTRAVENOUS at 21:29

## 2022-05-25 RX ADMIN — ALLOPURINOL 100 MG: 100 TABLET ORAL at 08:42

## 2022-05-25 RX ADMIN — BUMETANIDE 2 MG: 0.25 INJECTION, SOLUTION INTRAMUSCULAR; INTRAVENOUS at 08:41

## 2022-05-25 RX ADMIN — SODIUM CHLORIDE, PRESERVATIVE FREE 10 ML: 5 INJECTION INTRAVENOUS at 16:40

## 2022-05-25 RX ADMIN — POTASSIUM CHLORIDE 10 MEQ: 7.46 INJECTION, SOLUTION INTRAVENOUS at 12:52

## 2022-05-25 RX ADMIN — METOLAZONE 5 MG: 5 TABLET ORAL at 08:42

## 2022-05-25 RX ADMIN — CARVEDILOL 6.25 MG: 3.12 TABLET, FILM COATED ORAL at 16:28

## 2022-05-25 RX ADMIN — ASPIRIN 81 MG: 81 TABLET, CHEWABLE ORAL at 08:41

## 2022-05-25 NOTE — DISCHARGE SUMMARY
[facility] SUMMARY    Name:  Gasper Pepper  MR#:   [id number]  :  1955  ACCOUNT #:  [de-identified]  ADMIT DATE:  2022  DISCHARGE DATE:  2022    TRANSFER SUMMARY    DISCHARGE DIAGNOSES:  1. Critical coronary disease requiring tertiary center evaluation for stenting versus coronary bypass surgery. 2.  Chronic kidney disease, stage IV. 3.  History of gout. 4.  Hypertension. 5.  Acute-on-chronic systolic congestive heart failure. 6.  Non-ST-elevation myocardial infarction. HOSPITAL SUMMARY:  This is a amanda 57-year-old -American female who was admitted on the aforementioned date. She had come in for chest pain, and she has known coronary disease. She has a history of four stents in the past.  She has chronic kidney disease, stage IV; hypertension; gout; and a history of breast cancer with a double mastectomy. She had abnormally elevated troponins consistent with non-ST elevation MI and thus was admitted to Telemetry on a heparin drip with Cardiology consultation. She had Nitro-Bid ointment placed on admission. She was given repletion potassium and monitored. She developed acute pulmonary edema which required her transfer to the Intensive Care Unit for the rest of her stay at [facility]. Echocardiogram showed severely reduced ejection fraction at 15% to 20% with grade III diastolic dysfunction. The patient was treated with diuretics, seen by Nephrology, followed by Cardiology and the Intensivist.  The patient's renal function has stabilized over the last few days that she was awaiting a cardiac catheterization. Her creatinine is 2.70, her GFR is 21. Liver enzymes are elevated with ,  consistent with hepatic congestion due to heart failure. Her pulmonary edema has nicely improved. Her last chest x-ray on  showed mildly prominent central vascular marking.   The patient has a stable H and H at 10.7 and 34.5, normal platelet and white blood cell count. There have been no signs of infection outside of a mild UTI that was treated with Rocephin during her stay. The patient went for cardiac cath today. Unfortunately, she has critical coronary disease that will require evaluation for either bypass surgery or hybrid procedure with stent and possible bypass depending on CT Surgery's evaluation at [facility]. Today, the patient was feeling better. She had no chest pain, headache, shortness of breath, nausea, or palpitations. She was resting comfortably in the bed this morning. Lungs were clear bilaterally. Cardiac exam was regular rate and rhythm. No murmur or rub. Abdomen was soft and nontender. Lower extremities had improved edema with nonpitting noted at this point. DISPOSITION:  The patient is to be transferred to [facility] for further evaluation. DISCHARGE MEDICATIONS:  Her current medications are the following,  1. Allopurinol 100 mg daily. 2.  Aspirin 81 mg daily. 3.  Lipitor 40 mg daily. 4.  Pulmicort 500 mcg nebs twice daily. 5.  Bumex 2 mg IV q.12 hours. 6.  Coreg 6.25 mg twice daily. 7.  Pepcid 20 mg IV daily. 8.  Melatonin 3 mg at bedtime. 9.  Zaroxolyn 5 mg twice daily. 10.  Kayce-Colace one tablet daily. 11.  Heparinization drip per protocol. She is currently stable for transfer by ambulance to [facility] for CT Surgery evaluation, and Cardiology has discussed the case with the surgeon there this evening. They have been kind enough to accept her for further treatment and evaluation. The patient is currently stable on 2 to 3 L nasal cannula. Her cardiac record is updated by Dr. Nara Johnston in the chart. She is currently to stay on aspirin, beta-blocker, nitrates. Statin is being held because of the elevated LFTs. ACE inhibitor unfortunately cannot be used due to her renal failure at this point.   She is stable on the diuretics that she is receiving, and with that, she is anticipating transfer this evening. DISCHARGE INSTRUCTIONS:  She is on a cardiac low-sodium diet. She needs to remain on cardiac monitoring, heparin drip, and meds as noted above. She is a full code.       MD SANDRO [email or web address]  D:  05/25/2022 18:00  T:  05/25/2022 18:37  JOB #:  [id number]

## 2022-05-25 NOTE — PROGRESS NOTES
Bedside shift change report given to [facility] (oncoming nurse) by Samy Garcia (offgoing nurse). Report included the following information SBAR, Kardex, Procedure Summary, Intake/Output and Recent Results. Pt was transported to cardiac cath lab for left heart cath; unable to perform right heart. Per DR Val Saini Pt to be transferred to [facility]. Pt returned from cath lab with TR band. Removed air and started bleeding; reinserted air. Pt placed on Nitroglycerine drip. Gave transfer report to [address] [place] [address].

## 2022-05-25 NOTE — PROCEDURES
[facility] and Coronary angiogram done via left radial approach. Coronary angiogram revealed left main and triple vessel disease. LV gram was not done. LVEDP 45-48 mm hg and after 200 mcg of nitroglycerine it was 25-28 mm hg. No significant gradient on pull back. Patient tolerated procedure well. Scant blood loss. No complications. No specimen removed. Recommendation:    Medication considered:   Aspirin, beta blocker, ACEI, Nitrates and statin     Restart IV heparin 4 hours after TR band removal.  Start IV Nitroglycerine drip     CAD risk factor education  Diet education  Control cholesterol  Blood pressure control      Case discussed with CT surgery at [address] [facility] [address] . Received phone call back from [address] [facility] [address]. Eric Blanca. Patient is accepted by him at [address] for further management and revascularization.     Discussed with  Discussed with Randa Horn

## 2022-05-25 NOTE — PROGRESS NOTES
CM notes patient is scheduled for heat cath this afternoon. CM to continue to monitor and remain available.

## 2022-05-25 NOTE — PROGRESS NOTES
conducted a follow up visit with Yamini Cota, who is a 74 y.o.,female. Patient was visibly more calm today. She stated that she was \"feeling positive about this procedure and ready to get it done. \"  She trusts that God will protect and guide her through it all. I will lift her up in prayer today during her procedure. Continued the relationship of care and support. Listened empathically. Offered prayer and assurance of continued prayer on patients behalf. Chart reviewed. Patient expressed gratitude for Spiritual Care visit. Chaplains will continue to follow and will provide pastoral care as needed or requested.  recommends bedside caregivers page the  on duty if patient shows signs of acute spiritual or emotional distress. [address] [facility] [address].    Board Certified   [phone] - Office

## 2022-05-25 NOTE — PROGRESS NOTES
[facility]  Pulmonary, Critical Care, and Sleep Medicine    Name: Polo Trevino MRN: [id number]   : 1955 Hospital: [facility] [place]    Date: 2022  Room: [address] [place] [address] Note                                              Consult requesting physician: Dr. Papi Cisse  Reason for Consult: ICU monitoring for non-STEMI, acute on chronic systolic CHF    IMPRESSION:     -Non-ST MI  -Chronic respiratory failure with hypoxemia  - Ischemic cardiomyopathy  - Coronary artery disease  - Asthma  -Generalized anxiety  - Insomnia      Active Hospital Problems    Diagnosis Date Noted    Insomnia 2022    Elevated diaphragm 2022    Ischemic cardiomyopathy 2022    Chronic respiratory failure with hypoxia ([address] 2022    Generalized anxiety disorder 2022    NSTEMI (non-ST elevated myocardial infarction) ([address] 26/10/1194    Systolic CHF, chronic ([address] 2022    Mild intermittent asthma without complication     S/P bilateral mastectomy 2014    Stage 4 chronic kidney disease ([address] 2013    Atherosclerosis of coronary artery 2013    Essential hypertension 2013   ·    Patient Active Problem List   Diagnosis Code    Atherosclerosis of coronary artery I25.10    Essential hypertension I10    Malignant neoplasm of female breast ([address] C50.919    Mild intermittent asthma without complication T89.83    Moderate malnutrition ([address] E44.0    S/P bilateral mastectomy Z90.13    Stage 4 chronic kidney disease ([address] N18.4    NSTEMI (non-ST elevated myocardial infarction) ([address] I38.9    Systolic CHF, chronic (HCC) I50.22    Elevated diaphragm J98.6    Ischemic cardiomyopathy I25.5    Chronic respiratory failure with hypoxia ([facility]) J96.11    Generalized anxiety disorder F41.1    Insomnia G47.00     · Code status: Full Code       RECOMMENDATIONS:     Respiratory: Stable respirations; on nasal cannula oxygen at 2 L.    Chest x-ray- chronic right diaphragm elevation, seen on prior chest CT as well, with basal atelectasis. Bronchodilators- continue budesonide nebulizer twice daily; avoid LABA inhaler for now. Keep SPO2 >=92%. HOB 30 degree elevation all the time. Aspiration precautions. Incentive spirometry. CVS: Patient remained stable; no arrhythmias; telemetry-sinus rhythm. Patient on heparin infusion per ACS protocol. Cardiac medications-aspirin, statin, Bumex, carvedilol, metolazone, nitroglycerin patch. Hydralazine on hold. Cardiology Dr. Gale Scuhmacher following patient; likely plans for cardiac cath today. Echocardiogram- cardiomyopathy with EF 23-14%; grade 3 diastolic dysfunction; no pulmonary hypertension reported. ID: No active infection; continue to monitor. Hematology/Oncology: Stable hemoglobin and platelets; continue to monitor. Mild anemia; hemoglobin 10.7; platelets normal; no active bleeding issues. Monitor PTT while on heparin drip. Renal: Monitor renal function; good urine output; creatinine stable-2.70 today; mild hypokalemia. Replace potassium 20 mEq. Avoid nephrotoxins; nephrology on case. GI/: Oral diet as tolerated; currently n.p.o. pending cardiac cath decisions. Pepcid for GI prophylaxis    Endocrine: Monitor BS with am labs-stable. Neurology: Normal mentation; insomnia symptoms. BuSpar 5 mg 3 times a day as needed for anxiety-home dose. Melatonin 3 mg at bedtime; Ambien 5 mg at bedtime as needed. Skin/Wound: As per nursing care protocol    Electrolytes: Replace electrolytes per ICU electrolyte replacement protocol. IVF: Currently, no maintenance IV fluids. Nutrition: Oral diet. Prophylaxis: DVT Prophylaxis: Heparin. GI Prophylaxis: Famotidine. Restraints: none needed    Lines/Tubes: PIVs  External urinary catheter 5/23    Advance Directive/Palliative Care: Full code. Patient does not need palliative care consultation at this time.       Quality Care: PPI, DVT prophylaxis, HOB elevated, Infection control all reviewed and addressed. Care of plan d/w ICU RN; also discussed with patient in detail and updated management plans from ICU perspective. Patient remains in ICU due to cardiac status. High complexity decision making was performed during the evaluation of this patient at high risk for decompensation with multiple organ involvement. Total critical care time spent rendering care exclusive of procedures/family discussion/coordination of care: 34 minutes. Subjective/History of Present Illness:     Patient is a 74 y.o. female with PMHx significant for asthma, chronic systolic CHF, CKD, CAD, breast cancer status post double mastectomy, dysarthria, gout presented to [facility] ER with complaint of chest pressure like pain that started night prior to presentation, progressively increase without radiation and associated with orthopnea, shortness of breath symptoms. In ER, labs showed elevated troponin while EKG did not show any ST elevation. Patient started on heparin. Cardiology consulted and admitted to the floor. Her echo during hospital course showed worsening in her LVEF and labs showed worsening in S. Cr.  Subsequently, cardiology recommended monitoring in ICU, and patient was moved to ICU on 5/22/2022.    5/25/2022   Patient remains in ICU. No acute issues overnight. No chest pain or palpitations. No shortness of breath or cough or wheezing. Stable hemodynamics. Good urine output. She denies smoking. Patient on home O2 since March 2022 following hospital discharge; denies prior history of COPD or SUMIT.       Review of Systems:   -No fever or chills  - No chest pain or palpitations  - No vomiting or diarrhea  - No cough or hemoptysis  - No bleeding symptoms      Allergies   Allergen Reactions    Sulfa (Sulfonamide Antibiotics) Anxiety      Past Medical History:   Diagnosis Date    CHF (congestive heart failure) (HCC)     Hypertension       No past surgical history on file. Social History     Tobacco Use    Smoking status: Never Smoker    Smokeless tobacco: Never Used   Substance Use Topics    Alcohol use: Not Currently      No family history on file. Prior to Admission medications    Medication Sig Start Date End Date Taking? Authorizing Provider   furosemide (LASIX) 40 mg tablet Take 40 mg by mouth daily. Yes Provider, Historical   hydrALAZINE (APRESOLINE) 10 mg tablet Take 10 mg by mouth three (3) times daily. Yes Provider, Historical   busPIRone (BUSPAR) 5 mg tablet Take 5 mg by mouth three (3) times daily as needed. Indications: repeated episodes of anxiety   Yes Provider, Historical   carvediloL (COREG) 6.25 mg tablet Take 6.25 mg by mouth two (2) times a day.  10/22/21   Other, MD Sayda     Current Facility-Administered Medications   Medication Dose Route Frequency    melatonin tablet 3 mg  3 mg Oral QHS    metOLazone (ZAROXOLYN) tablet 5 mg  5 mg Oral BID    bumetanide (BUMEX) injection 2 mg  2 mg IntraVENous Q12H    senna-docusate (PERICOLACE) 8.6-50 mg per tablet 1 Tablet  1 Tablet Oral DAILY    budesonide (PULMICORT) 500 mcg/2 ml nebulizer suspension  500 mcg Nebulization BID RT    famotidine (PF) (PEPCID) 20 mg in 0.9% sodium chloride 10 mL injection  20 mg IntraVENous Q24H    atorvastatin (LIPITOR) tablet 40 mg  40 mg Oral DAILY    heparin 25,000 units in  mL infusion  11.2-25 Units/kg/hr IntraVENous TITRATE    aspirin chewable tablet 81 mg  81 mg Oral DAILY    allopurinoL (ZYLOPRIM) tablet 100 mg  100 mg Oral DAILY    [Held by provider] hydrALAZINE (APRESOLINE) tablet 10 mg  10 mg Oral TID    nitroglycerin (NITROBID) 2 % ointment 1 Inch  1 Inch Topical BID    carvediloL (COREG) tablet 6.25 mg  6.25 mg Oral BID WITH MEALS         Objective:   Vital Signs:    Visit Vitals  /84   Pulse 68   Temp 98.5 °F (36.9 °C)   Resp 14   Ht 5' 7\" (1.702 m)   Wt 85.7 kg (188 lb 15 oz)   SpO2 93%   BMI 29.59 kg/m²       O2 Device: Nasal cannula   O2 Flow Rate (L/min): 2 l/min   Temp (24hrs), Av.8 °F (36.6 °C), Min:97.4 °F (36.3 °C), Max:98.5 °F (36.9 °C)       Intake/Output:   Last shift:      No intake/output data recorded. Last 3 shifts:  1901 -  0700  In: 953.9 [P.O.:350; I.V.:603.9]  Out: 6150 [Urine:6150]      Intake/Output Summary (Last 24 hours) at 2022 1133  Last data filed at 2022 0700  Gross per 24 hour   Intake 605.94 ml   Output 3100 ml   Net -2494.06 ml       Last 3 Recorded Weights in this Encounter    22 1550 22 0840   Weight: 88.5 kg (195 lb) 91.2 kg (201 lb 1.6 oz) 85.7 kg (188 lb 15 oz)       Physical Exam:     Comfortable; on 2 lits nc; acyanotic  HEENT: pupils not dilated, reactive, no scleral jaundice  Neck: No adenopathy or thyroid swelling  CVS: S1-S2; no murmur; JVD not elevated; telemetry-sinus rhythm  RS: Good air entry bilateral; no wheezing; few bibasal crackles; decreased breath sound right base; not tachypneic or in distress   Abd: Soft, not under, not distended, no guarding or rigidity  Extrm: no leg edema or swelling or clubbing  Skin: no rash  Lymphatic: no cervical or supraclavicular adenopathy  Psych: Cooperative      Data:       Recent Results (from the past 12 hour(s))   CBC WITH AUTOMATED DIFF    Collection Time: 22  3:00 AM   Result Value Ref Range    WBC 5.4 4.6 - 13.2 K/uL    RBC 3.83 (L) 4.20 - 5.30 M/uL    HGB 10.7 (L) 12.0 - 16.0 g/dL    HCT 34.5 (L) 35.0 - 45.0 %    MCV 90.1 78.0 - 100.0 FL    MCH 27.9 24.0 - 34.0 PG    MCHC 31.0 31.0 - 37.0 g/dL    RDW 15.9 (H) 11.6 - 14.5 %    PLATELET 471 152 - 735 K/uL    MPV 12.2 (H) 9.2 - 11.8 FL    NRBC 0.0 0  WBC    ABSOLUTE NRBC 0.00 0.00 - 0.01 K/uL    NEUTROPHILS 71 40 - 73 %    LYMPHOCYTES 15 (L) 21 - 52 %    MONOCYTES 12 (H) 3 - 10 %    EOSINOPHILS 2 0 - 5 %    BASOPHILS 0 0 - 2 %    IMMATURE GRANULOCYTES 1 (H) 0.0 - 0.5 %    ABS. NEUTROPHILS 3.8 1.8 - 8.0 K/UL    ABS. LYMPHOCYTES 0.8 (L) 0.9 - 3.6 K/UL    ABS. MONOCYTES 0.6 0.05 - 1.2 K/UL    ABS. EOSINOPHILS 0.1 0.0 - 0.4 K/UL    ABS. BASOPHILS 0.0 0.0 - 0.1 K/UL    ABS. IMM. GRANS. 0.0 0.00 - 0.04 K/UL    DF AUTOMATED     PTT    Collection Time: 05/25/22  3:00 AM   Result Value Ref Range    aPTT 115.2 (H) 23.0 - 29.8 SEC   METABOLIC PANEL, COMPREHENSIVE    Collection Time: 05/25/22  3:00 AM   Result Value Ref Range    Sodium 140 136 - 145 mmol/L    Potassium 3.5 3.5 - 5.5 mmol/L    Chloride 101 100 - 111 mmol/L    CO2 29 21 - 32 mmol/L    Anion gap 10 3.0 - 18 mmol/L    Glucose 98 74 - 99 mg/dL    BUN 68 (H) 7.0 - 18 MG/DL    Creatinine 2.70 (H) 0.6 - 1.3 MG/DL    BUN/Creatinine ratio 25 (H) 12 - 20      GFR est AA 21 (L) >60 ml/min/1.73m2    GFR est non-AA 18 (L) >60 ml/min/1.73m2    Calcium 9.1 8.5 - 10.1 MG/DL    Bilirubin, total 0.6 0.2 - 1.0 MG/DL    ALT (SGPT) 171 (H) 13 - 56 U/L    AST (SGOT) 131 (H) 10 - 38 U/L    Alk. phosphatase 119 (H) 45 - 117 U/L    Protein, total 6.5 6.4 - 8.2 g/dL    Albumin 3.0 (L) 3.4 - 5.0 g/dL    Globulin 3.5 2.0 - 4.0 g/dL    A-G Ratio 0.9 0.8 - 1.7     PROTHROMBIN TIME + INR    Collection Time: 05/25/22  3:00 AM   Result Value Ref Range    Prothrombin time 14.7 11.5 - 15.2 sec    INR 1.1 0.8 - 1.2             Chemistry Recent Labs     05/25/22  0300 05/24/22  0425 05/23/22  0550   GLU 98 107* 89    142 139   K 3.5 3.7 3.9    105 107   CO2 29 30 26   BUN 68* 73* 66*   CREA 2.70* 2.97* 3.15*   CA 9.1 9.0 8.9   MG  --   --  2.3   AGAP 10 7 6   BUCR 25* 25* 21*   *  --   --    TP 6.5  --   --    ALB 3.0*  --   --    GLOB 3.5  --   --    AGRAT 0.9  --   --         Lactic Acid Lactic acid   Date Value Ref Range Status   01/14/2022 0.9 0.4 - 2.0 MMOL/L Final     No results for input(s): LAC in the last 72 hours.      Liver Enzymes Protein, total   Date Value Ref Range Status   05/25/2022 6.5 6.4 - 8.2 g/dL Final     Albumin   Date Value Ref Range Status   05/25/2022 3.0 (L) 3.4 - 5.0 g/dL Final     Globulin   Date Value Ref Range Status   05/25/2022 3.5 2.0 - 4.0 g/dL Final     A-G Ratio   Date Value Ref Range Status   05/25/2022 0.9 0.8 - 1.7   Final     Alk. phosphatase   Date Value Ref Range Status   05/25/2022 119 (H) 45 - 117 U/L Final     Recent Labs     05/25/22  0300   TP 6.5   ALB 3.0*   GLOB 3.5   AGRAT 0.9   *        CBC w/Diff Recent Labs     05/25/22  0300 05/24/22  0425 05/23/22  0550   WBC 5.4 5.9 5.0   RBC 3.83* 3.71* 3.52*   HGB 10.7* 10.3* 10.0*   HCT 34.5* 33.6* 32.5*    204 159   GRANS 71 74* 76*   LYMPH 15* 13* 12*   EOS 2 1 1        Cardiac Enzymes No results found for: CPK, CK, CKMMB, CKMB, RCK3, CKMBT, CKNDX, CKND1, BEBA, TROPT, TROIQ, MARILUZ, TROPT, TNIPOC, BNP, BNPP     BNP No results found for: BNP, BNPP, XBNPT     Coagulation Recent Labs     05/25/22  0300 05/24/22  0425 05/23/22  0550   PTP 14.7  --   --    INR 1.1  --   --    APTT 115.2* 109.9* 88.0*         Thyroid  No results found for: T4, T3U, TSH, TSHEXT, TSHEXT    No results found for: T4     Urinalysis Lab Results   Component Value Date/Time    Color YELLOW 05/20/2022 12:00 PM    Appearance CLEAR 05/20/2022 12:00 PM    Specific gravity 1.017 05/20/2022 12:00 PM    pH (UA) 5.0 05/20/2022 12:00 PM    Protein 100 (A) 05/20/2022 12:00 PM    Glucose Negative 05/20/2022 12:00 PM    Ketone TRACE (A) 05/20/2022 12:00 PM    Bilirubin Negative 05/20/2022 12:00 PM    Urobilinogen 1.0 05/20/2022 12:00 PM    Nitrites Negative 05/20/2022 12:00 PM    Leukocyte Esterase SMALL (A) 05/20/2022 12:00 PM    Epithelial cells 1+ 05/20/2022 12:00 PM    Bacteria 1+ (A) 05/20/2022 12:00 PM    WBC 11 to 20 05/20/2022 12:00 PM    RBC Negative 05/20/2022 12:00 PM          Culture data during this hospitalization. All Micro Results     None           ECHO ADULT COMPLETE  Result Date: 5/20/2022    Left Ventricle: Severely reduced left ventricular systolic function with a visually estimated EF of 15 - 20%.  Left ventricle is mildly dilated. Severe septal thickening. Moderate posterior thickening. Severe global hypokinesis present. Grade III diastolic dysfunction with increased LAP.   Right Ventricle: Right ventricle is mildly dilated. Moderately reduced systolic function.   Aortic Valve: Tricuspid valve. Mildly thickened left, right and noncoronary cusps. Moderate annular calcification.   Mitral Valve: Findings consistent with rheumatic disease. Mildly thickened leaflet, at the anterior and posterior leaflets. Mild annular calcification at the posterior leaflet of the mitral valve. Moderate paravalvular regurgitation. Mild to moderate stenosis noted.   Tricuspid Valve: Mildly thickened leaflets.   Pericardium: There is evidence of epicardial fat. Trivial circumferential pericardial effusion present. No indication of cardiac tamponade.   Technical qualifiers: Echo study was technically difficult due to patient's body habitus.   Contrast used: Definity. Images report reviewed by me:  CT 1/13/2022 (Most Recent)  Results from [facility] [place] on [place]/13/22    CTA CHEST W OR W WO CONT    Narrative  EXAM: CTA Chest    CLINICAL INDICATION/HISTORY: Shortness of breath, Covid positive. Possible PE.    COMPARISON: Plain film chest same day    TECHNIQUE: Axial CT imaging from the thoracic inlet through the diaphragm with  intravenous contrast utilizing CTA study for pulmonary artery evaluation. Coronal and sagittal MIP reformations were generated at a separate workstation. One or more dose reduction techniques were used on this CT: automated exposure  control, adjustment of the mAs and/or kVp according to patient's size, and  iterative reconstruction techniques. The specific techniques utilized on this CT  exam have been documented in the patient's electronic medical record.   Digital  imaging and communications and medicine (DICOM) format image data are available  to nonaffiliated external healthcare facilities or entities on a secure, media  free, reciprocally searchable basis with patient authorization for at least a 12  month period after this study. _______________    FINDINGS:    EXAM QUALITY: Overall exam quality is adequate. Pulmonary arterial enhancement  is adequate. The breath hold is satisfactory. Respiratory motion artifact is  present, limiting evaluation of peripheral vessels. PULMONARY ARTERIES: No convincing evidence of pulmonary embolism. MEDIASTINUM: Mild diffuse cardiomegaly with coronary artery calcifications with  no pericardial effusion. Mild atherosclerotic changes and ectasia thoracic  aorta. LYMPH NODES: Borderline enlarged mediastinal lymph nodes maximally 12 mm AP  window short axis dimension. AIRWAY: Unremarkable. LUNGS: Mild partial atelectasis bilateral lower lobes and mild small peripheral  areas of groundglass opacity bilateral upper lobes as well as small bandlike  atelectasis or scarring right upper lobe. No abnormal mass. PLEURA: No pleural effusion or pneumothorax. UPPER ABDOMEN: Reflux of contrast into the IVC and hepatic veins. Several  nonobstructing right renal calculi the largest right lower pole maximally 12 mm. Several small nonobstructing left renal calculi. .    OTHER: No acute or aggressive osseous abnormalities identified. _______________    Impression  1. Mildly limited study due to motion obscuring peripheral vessels but no  definite evidence of pulmonary embolism. 2. Mild areas of bilateral lower lobe and partial right upper lobe atelectasis. Very mild peripheral nonspecific groundglass opacities bilateral upper lobes  which can be related to small airway disease, edema or atypical infection. 3. Cardiomegaly with reflux of contrast into IVC suggesting right heart failure. 4. Incidental bilateral nonobstructing renal calculi. CXR reviewed by me:  XR 5/23 (Most Recent).   Results from [place] encounter on 05/20/22    XR CHEST PORT    Narrative  EXAM: One-view chest    CLINICAL HISTORY: Congestive heart failure ,    COMPARISON: None    FINDINGS:    Frontal view of the chest demonstrate moderately prominent central vascular  markings. . Cardiac silhouette is normal in size and contour. No acute bony or  soft tissue abnormality. Impression  No significant interval change. Mildly prominent central vascular markings again  noted. Otherwise fairly clear           Please note: Voice-recognition software may have been used to generate this report, which may have resulted in some phonetic-based errors in grammar and contents. Even though attempts were made to correct all the mistakes, some may have been missed, and remained in the body of the document.       Piyush Wood MD  5/25/2022

## 2022-05-25 NOTE — PROGRESS NOTES
Cardiology Progress Note        Patient: Unique Garcia        Sex: female          DOA: 5/20/2022  YOB: 1955      Age:  74 y.o.        LOS:  LOS: 5 days      Patient seen and examined, chart reviewed. Assessment/Plan     Patient Active Problem List   Diagnosis Code    Atherosclerosis of coronary artery I25.10    Essential hypertension I10    Malignant neoplasm of female breast ([facility] C50.919    Mild intermittent asthma without complication F74.42    Moderate malnutrition ([facility]) E44.0    S/P bilateral mastectomy Z90.13    Stage 4 chronic kidney disease ([facility]) N18.4    NSTEMI (non-ST elevated myocardial infarction) ([facility] [facility] J19.1    Systolic CHF, chronic ([facility]) I50.22    Elevated diaphragm J98.6    Ischemic cardiomyopathy I25.5    Chronic respiratory failure with hypoxia ([facility]) J96.11    Generalized anxiety disorder F41.1    Insomnia G47.00      NSTEMI  Acute on chronic systolic heart failure    Echocardiogram reported      Left Ventricle: Severely reduced left ventricular systolic function with a visually estimated EF of 15 - 20%. Left ventricle is mildly dilated. Severe septal thickening. Moderate posterior thickening. Severe global hypokinesis present. Grade III diastolic dysfunction with increased LAP.   Right Ventricle: Right ventricle is mildly dilated. Moderately reduced systolic function.   Aortic Valve: Tricuspid valve. Mildly thickened left, right and noncoronary cusps. Moderate annular calcification.   Mitral Valve: Findings consistent with rheumatic disease. Mildly thickened leaflet, at the anterior and posterior leaflets. Mild annular calcification at the posterior leaflet of the mitral valve. Moderate paravalvular regurgitation. Mild to moderate stenosis noted.   Tricuspid Valve: Mildly thickened leaflets.   Pericardium: There is evidence of epicardial fat. Trivial circumferential pericardial effusion present.  No indication of cardiac tamponade.   Technical qualifiers: Echo study was technically difficult due to patient's body habitus.   Contrast used: Definity.     PCI was done on 01/06/2015    1. Coronary angiography showed two vessel disease in a right dominant system. 2. Left ventriculography was deferred; reduced EF (45-50%) by non-invasive methods. 3. Successful PCI of the mid LAD using a 3.0x16mm [place] CHELA and the proximal LCx using a [id number] [place] CHELA. Plan:    Continue aspirin, carvedilol, nitropaste and atorvastatin. Continue IV heparin. Continue diuretics as per nephrologist  Strict input output  Monitor renal function and electrolytes  In view of non-STEMI and worsening of systolic heart failure advised about left heart catheterization, right heart catheterization, coronary angiogram plus or minus PCI. All risks, benefits and alternatives explained to patient and she verbally understood. Discussed with patient about risk of cardiac catheterization including not limited are hematoma, retroperitoneal bleed, pseudoaneurysm, AV fistula, dissection, thrombosis and embolism, radial artery occlusion, myocardial infarction, CVA, TIA, dissection and perforation of great vessels, cardiac perforation, tamponade, atheroembolism, allergy reaction, infection, acute renal failure, arrhythmias, radiation injury, congestive heart failure, respiratory failure, multiorgan failure, death. Patient agreed for procedure. Case discussed with nephrologist.    As creatine is improving will proceed for cardiac catheterization today. Further recommendation to follow after cardiac catheterization                Subjective:    cc:   denies any chest pain,  My breathing is little better today      REVIEW OF SYSTEMS:     General: No fevers or chills. Cardiovascular: No chest pain,No palpitations,  positive orthopnea, No PND, No leg swelling, No claudication  Pulmonary: No   Dyspnea. Gastrointestinal: No nausea, vomiting, bleeding  Neurology: No Dizziness    Objective:      Visit Vitals  BP (!) 143/89   Pulse 70   Temp 98.6 °F (37 °C)   Resp 25   Ht 5' 7\" (1.702 m)   Wt 85.7 kg (188 lb 15 oz)   SpO2 98%   BMI 29.59 kg/m²     Body mass index is 29.59 kg/m². Physical Exam:  General Appearance: Comfortable, not using accessory muscles of respiration. HEENT: DARIN. HEAD: Atraumatic  NECK: No JVD, no thyroidomeglay. CAROTIDS: no bruit  LUNGS: Clear bilaterally. HEART: S1+S2 audible, no murmur, no pericardial rub. ABD: Non-tender, BS Audible    EXT: No edema, and no cyanosis. VASCULAR EXAM: Pulses are intact. PSYCHIATRIC EXAM: Mood is appropriate. MUSCULOSKELETAL: Grossly no joint deformity.   NEUROLOGICAL: AAO times 3, No motor and sensory deficit    Medication:  Current Facility-Administered Medications   Medication Dose Route Frequency    potassium chloride 10 mEq in 100 ml IVPB  10 mEq IntraVENous Q1H    zolpidem (AMBIEN) tablet 5 mg  5 mg Oral QHS PRN    melatonin tablet 3 mg  3 mg Oral QHS    metOLazone (ZAROXOLYN) tablet 5 mg  5 mg Oral BID    busPIRone (BUSPAR) tablet 5 mg  5 mg Oral TID PRN    bumetanide (BUMEX) injection 2 mg  2 mg IntraVENous Q12H    senna-docusate (PERICOLACE) 8.6-50 mg per tablet 1 Tablet  1 Tablet Oral DAILY    albuterol-ipratropium (DUO-NEB) 2.5 MG-0.5 MG/3 ML  3 mL Nebulization Q6H PRN    budesonide (PULMICORT) 500 mcg/2 ml nebulizer suspension  500 mcg Nebulization BID RT    famotidine (PF) (PEPCID) 20 mg in 0.9% sodium chloride 10 mL injection  20 mg IntraVENous Q24H    atorvastatin (LIPITOR) tablet 40 mg  40 mg Oral DAILY    heparin 25,000 units in  mL infusion  11.2-25 Units/kg/hr IntraVENous TITRATE    aspirin chewable tablet 81 mg  81 mg Oral DAILY    allopurinoL (ZYLOPRIM) tablet 100 mg  100 mg Oral DAILY    [Held by provider] hydrALAZINE (APRESOLINE) tablet 10 mg  10 mg Oral TID    nitroglycerin (NITROBID) 2 % ointment 1 Inch  1 Inch Topical BID    carvediloL (COREG) tablet 6.25 mg  6.25 mg Oral BID WITH MEALS    acetaminophen (TYLENOL) tablet 650 mg  650 mg Oral Q6H PRN    morphine injection 2 mg  2 mg IntraVENous Q3H PRN    ondansetron (ZOFRAN) injection 4 mg  4 mg IntraVENous Q6H PRN    diphenhydrAMINE (BENADRYL) injection 12.5 mg  12.5 mg IntraVENous Q6H PRN               Lab/Data Reviewed:       Recent Labs     05/25/22 0300 05/24/22 0425 05/23/22  0550   WBC 5.4 5.9 5.0   HGB 10.7* 10.3* 10.0*   HCT 34.5* 33.6* 32.5*    204 159     Recent Labs     05/25/22 0300 05/24/22 0425 05/23/22  0550    142 139   K 3.5 3.7 3.9    105 107   CO2 29 30 26   GLU 98 107* 89   BUN 68* 73* 66*   CREA 2.70* 2.97* 3.15*   CA 9.1 9.0 8.9     32 minutes of critical care time spent in the direct evaluation and treatment of this high risk patient. The reason for providing this level of medical care for this critically ill patient was due a critical illness that impaired one or more vital organ systems such that there was a high probability of imminent or life threatening deterioration in the patient's condition. This care involved high complexity decision making to assess, manipulate, and support vital system functions, to treat this degree vital organ system failure and to prevent further life threatening deterioration of the patient's condition.       Signed By: Britton Vinson MD     May 25, 2022

## 2022-05-25 NOTE — PROGRESS NOTES
Hospitalist Progress Note    Patient: Thomasenia Kehr MRN: [id number]  CSN: [id number]    YOB: 1955  Age: 74 y.o.   Sex: female    DOA: 5/20/2022 LOS:  LOS: 5 days          Chief Complaint:    NSTEMI      Assessment/Plan     46-year-old female with extensive medical history, congestive heart failure, chronic kidney disease, coronary disease with stents, hypertension presents to ER with  chest pain    NSTEMI -c/w clinical sx, and abnormal troponins  On heparin drip  On aspirin, lipitor, coreg, nitro paste  Cardiology anticipating cath today  Renal function is limiting factor but is has improved and likely this is optimal level for her chronic disease     Acute on chronic systolic CHF   On bumex and zaroxolyn     HTN -  conitnue current meds     CKD stage 4   Monitor renal function  CR less than 3     History of gout  Continue with allopurinol     Mild UTI-complete rocephin    ICU level care due to her very low EF , recent MI,  and renal disease    She is stable this am  Anticipate cath this afternoon and return to ICU post op    Follow daily labs with renal function  Disposition :  Patient Active Problem List   Diagnosis Code    Atherosclerosis of coronary artery I25.10    Essential hypertension I10    Malignant neoplasm of female breast ([facility] [address] C50.919    Mild intermittent asthma without complication E54.80    Moderate malnutrition ([address] E44.0    S/P bilateral mastectomy Z90.13    Stage 4 chronic kidney disease ([facility] N18.4    NSTEMI (non-ST elevated myocardial infarction) ([facility] [address] U56.2    Systolic CHF, chronic ([facility]) I50.22    Elevated diaphragm J98.6    Ischemic cardiomyopathy I25.5    Chronic respiratory failure with hypoxia ([facility] [address] J96.11    Generalized anxiety disorder F41.1    Insomnia G47.00       Subjective:    I feel fine  She has no complaints  Denies CP, SOB, nausea, HA    Review of systems:    Constitutional: denies fevers, chills  Respiratory: denies SOB, cough  Cardiovascular: denies chest pain  Gastrointestinal: denies nausea, vomiting      Vital signs/Intake and Output:  Visit Vitals  BP (!) 146/106   Pulse 70   Temp 97.6 °F (36.4 °C)   Resp 24   Ht 5' 7\" (1.702 m)   Wt 85.7 kg (188 lb 15 oz)   SpO2 99%   BMI 29.59 kg/m²     Current Shift:  No intake/output data recorded. Last three shifts:  05/23 1901 - 05/25 0700  In: 737 [P.O.:350; I.V.:387]  Out: 4350 [Urine:4350]    Exam:    General: Well developed, alert, NAD, OX3  CVS:Regular rate and rhythm, no M/R/G, S1/S2 heard, no thrill  Lungs:Clear to auscultation bilaterally, no wheezes, rhonchi, or rales  Abdomen: Soft, Nontender, No distention, Normal Bowel sounds  Extremities: No C/C/E, pulses palpable 2+  Neuro:grossly normal, follows commands  Psych:appropriate                Labs: Results:       Chemistry Recent Labs     05/25/22  0300 05/24/22 0425 05/23/22  0550   GLU 98 107* 89    142 139   K 3.5 3.7 3.9    105 107   CO2 29 30 26   BUN 68* 73* 66*   CREA 2.70* 2.97* 3.15*   CA 9.1 9.0 8.9   AGAP 10 7 6   BUCR 25* 25* 21*   *  --   --    TP 6.5  --   --    ALB 3.0*  --   --    GLOB 3.5  --   --    AGRAT 0.9  --   --       CBC w/Diff Recent Labs     05/25/22  0300 05/24/22 0425 05/23/22  0550   WBC 5.4 5.9 5.0   RBC 3.83* 3.71* 3.52*   HGB 10.7* 10.3* 10.0*   HCT 34.5* 33.6* 32.5*    204 159   GRANS 71 74* 76*   LYMPH 15* 13* 12*   EOS 2 1 1      Cardiac Enzymes No results for input(s): CPK, CKND1, BEBA in the last 72 hours. No lab exists for component: CKRMB, TROIP   Coagulation Recent Labs     05/25/22  0300 05/24/22  0425   PTP 14.7  --    INR 1.1  --    APTT 115.2* 109.9*       Lipid Panel No results found for: CHOL, CHOLPOCT, CHOLX, CHLST, CHOLV, 030945, HDL, HDLP, LDL, LDLC, DLDLP, 157322, VLDLC, VLDL, TGLX, TRIGL, TRIGP, TGLPOCT, CHHD, CHHDX   BNP No results for input(s): BNPP in the last 72 hours.    Liver Enzymes Recent Labs     05/25/22  0300   TP 6.5   ALB 3.0*   *      Thyroid Studies No results found for: T4, T3U, TSH, TSHEXT     Procedures/imaging: see electronic medical records for all procedures/Xrays and details which were not copied into this note but were reviewed prior to creation of Arabella MD Marcos

## 2022-05-25 NOTE — H&P
Date of Surgery Update: Umang Suarez was seen and examined. History and physical has been reviewed. The patient has been examined. There have been no significant clinical changes since the completion of the originally dated History and Physical.      Patient assessed and is candidate for moderate sedation.       Signed By: Rand Ferreira MD     May 25, 2022 2:41 PM

## 2022-05-25 NOTE — PROGRESS NOTES
RENAL CONSULT PROGRESS NOTE   2022    Patient: Joseph Park  :  1955  Gender:  female  MRN #:  [id number]    Reason for Consult: Acute renal failure on CKD       Assessment:  Joseph Park is a 74y.o. year old female with h/o CKD stage G4 , congestive heart failure, coronary artery disease with stents, hypertension presented with chest pain and SOB. Has elevated BNAP and troponin consistent with NSTEMI, on heparin drip now . Uses oxygen at home for CHF. She developed acute kidney injury on CKD with creatinine elevation from 2.86 mg/dl to 3.44 mg/dl in 24 hours on     She has been seen by cardiology , ECHO showed EF 15-20 % , grade 3 diastolic CHF, still on oxygen . # Acute renal failure- Most likely due to cardiac decompensation   #CKD stage 4   # HTN  # Acute CHF  #NSTEMI     Subjective:   She thinks SOB is getting better every day still has orthopnea   BP stable, UPO is 2850  cc overnight   No chest pain and nausea     Plan:    - Patient examined and labs reviewed . Decent urine output overnight. Renal functions is slowly improving . Still has symptoms of fluid overload   - continue Iv bumex 2 mg BID and metolazone to 5 mg BID , would not increase diuretics in light of cardiac cath and contrast exposure which has risk of worsening renal failure and risk of requiring dialysis .    Explained the risk , she voiced understanding and agreed to it which I think is reasonable   - She does not have clinical indications of dialysis   - Further work up and treatment per cardiology   - Bladder scan daily to ensure she is not retaining  - -Intake and output recording   - Avoid contrast and nephrotoxin   - consider low dose contrast if she were to get cardiac cath   - Dose all  meds for current eGFR   - Coreg for HTN        Intake/Output Summary (Last 24 hours) at 2022 1040  Last data filed at 2022 0700  Gross per 24 hour   Intake 605.94 ml   Output 4300 ml   Net -3694.06 ml Visit Vitals  /84   Pulse 68   Temp 98.5 °F (36.9 °C)   Resp 14   Ht 5' 7\" (1.702 m)   Wt 85.7 kg (188 lb 15 oz)   SpO2 93%   BMI 29.59 kg/m²       Physical Exam:    Pt awake,  alert and comfortable  HEENT:  no neck swelling  Lung: Fine crackles at lung base b/l   Heart: s1s2 regualr no rubs or murmur  Abdomen: soft, non tender, no guarding  Ext: trace edema   CNS- Oriented to time , place and person     Laboratory Data:    Lab Results   Component Value Date    BUN 68 (H) 05/25/2022    BUN 73 (H) 05/24/2022    BUN 66 (H) 05/23/2022     05/25/2022     05/24/2022     05/23/2022    CO2 29 05/25/2022    CO2 30 05/24/2022    CO2 26 05/23/2022     Lab Results   Component Value Date    WBC 5.4 05/25/2022    HGB 10.7 (L) 05/25/2022    HCT 34.5 (L) 05/25/2022     No components found for: CALCIUM, PHOSPHORUS, MAGNESIUM  No results found for: HDL  No results found for: SPECIMENTYP, TURBIDITY, UGLU    Imaging Reveiwed:    CXR reviwed       discussed with the patient and ICU team     Approx 31 minutes of critical care time spent in the direct evaluation and formulation of treatment plan of this high risk patient. The reason for providing this level of medical care was due a critical illness that impaired one or more vital organ systems such that there was a high probability of imminent or life threatening deterioration in the patients condition. This care involved high complexity decision making to assess, manipulate, and support vital system functions, to treat this degreee vital organ system failure and to prevent further life threatening deterioration of the patients condition.          Ranelle Heimlich, MD, MD  [facility].- Nephrology

## 2022-05-26 VITALS
TEMPERATURE: 98.1 F | BODY MASS INDEX: 29.65 KG/M2 | SYSTOLIC BLOOD PRESSURE: 150 MMHG | DIASTOLIC BLOOD PRESSURE: 96 MMHG | HEART RATE: 68 BPM | OXYGEN SATURATION: 100 % | WEIGHT: 188.93 LBS | HEIGHT: 67 IN | RESPIRATION RATE: 17 BRPM

## 2022-05-26 NOTE — PROGRESS NOTES
[address] [place] [address] arrived for patient. VSS at time of departure. Nursing supervisor and receiving unit at [address] [place] [address] notifed of patient departure. No changes since report was given by Santy Alcocer RN.

## 2022-05-26 NOTE — PROGRESS NOTES
Problem: Falls - Risk of  Goal: *Absence of Falls  Description: Document Bebe Villanueva Fall Risk and appropriate interventions in the flowsheet. Outcome: Progressing Towards Goal  Note: Fall Risk Interventions:  Mobility Interventions: Assess mobility with egress test,Bed/chair exit alarm         Medication Interventions: Bed/chair exit alarm,Evaluate medications/consider consulting pharmacy    Elimination Interventions: Bed/chair exit alarm,Call light in reach,Patient to call for help with toileting needs,Toileting schedule/hourly rounds    History of Falls Interventions: Bed/chair exit alarm         Problem: Patient Education: Go to Patient Education Activity  Goal: Patient/Family Education  Outcome: Progressing Towards Goal     Problem: Tissue Perfusion - Cardiopulmonary, Altered  Goal: *Optimize tissue perfusion  Outcome: Progressing Towards Goal  Goal: *Absence of hypoxia  Outcome: Progressing Towards Goal     Problem: Patient Education: Go to Patient Education Activity  Goal: Patient/Family Education  Outcome: Progressing Towards Goal     Problem: Pain  Goal: *Control of Pain  Outcome: Progressing Towards Goal     Problem: Patient Education: Go to Patient Education Activity  Goal: Patient/Family Education  Outcome: Progressing Towards Goal     Problem: Falls - Risk of  Goal: *Absence of Falls  Description: Document Vidhi Fall Risk and appropriate interventions in the flowsheet.   Outcome: Progressing Towards Goal  Note: Fall Risk Interventions:  Mobility Interventions: Assess mobility with egress test,Bed/chair exit alarm         Medication Interventions: Bed/chair exit alarm,Evaluate medications/consider consulting pharmacy    Elimination Interventions: Bed/chair exit alarm,Call light in reach,Patient to call for help with toileting needs,Toileting schedule/hourly rounds    History of Falls Interventions: Bed/chair exit alarm         Problem: Patient Education: Go to Patient Education Activity  Goal: Patient/Family Education  Outcome: Progressing Towards Goal     Problem: Fluid Volume - Risk of, Imbalanced  Goal: *Balanced intake and output  Outcome: Progressing Towards Goal     Problem: Patient Education: Go to Patient Education Activity  Goal: Patient/Family Education  Outcome: Progressing Towards Goal     Problem: Pressure Injury - Risk of  Goal: *Prevention of pressure injury  Description: Document Brenton Scale and appropriate interventions in the flowsheet.   Outcome: Progressing Towards Goal  Note: Pressure Injury Interventions:  Sensory Interventions: Assess changes in LOC,Keep linens dry and wrinkle-free,Minimize linen layers,Pressure redistribution bed/mattress (bed type)    Moisture Interventions: Absorbent underpads,Apply protective barrier, creams and emollients,Check for incontinence Q2 hours and as needed,Internal/External urinary devices,Minimize layers    Activity Interventions: Pressure redistribution bed/mattress(bed type)    Mobility Interventions: Pressure redistribution bed/mattress (bed type)    Nutrition Interventions: Document food/fluid/supplement intake    Friction and Shear Interventions: Apply protective barrier, creams and emollients,Lift team/patient mobility team,Minimize layers,Transferring/repositioning devices                Problem: Patient Education: Go to Patient Education Activity  Goal: Patient/Family Education  Outcome: Progressing Towards Goal

## 2022-06-25 ENCOUNTER — APPOINTMENT (OUTPATIENT)
Dept: GENERAL RADIOLOGY | Age: 67
End: 2022-06-25
Attending: STUDENT IN AN ORGANIZED HEALTH CARE EDUCATION/TRAINING PROGRAM
Payer: MEDICARE

## 2022-06-25 ENCOUNTER — HOSPITAL ENCOUNTER (EMERGENCY)
Age: 67
Discharge: ANOTHER HOSPITAL | End: 2022-06-25
Attending: STUDENT IN AN ORGANIZED HEALTH CARE EDUCATION/TRAINING PROGRAM
Payer: MEDICARE

## 2022-06-25 VITALS
HEIGHT: 66 IN | DIASTOLIC BLOOD PRESSURE: 88 MMHG | SYSTOLIC BLOOD PRESSURE: 127 MMHG | OXYGEN SATURATION: 100 % | RESPIRATION RATE: 20 BRPM | TEMPERATURE: 97 F | HEART RATE: 87 BPM | WEIGHT: 180 LBS | BODY MASS INDEX: 28.93 KG/M2

## 2022-06-25 DIAGNOSIS — R06.02 SOB (SHORTNESS OF BREATH): ICD-10-CM

## 2022-06-25 DIAGNOSIS — R77.8 ELEVATED TROPONIN: ICD-10-CM

## 2022-06-25 DIAGNOSIS — I50.9 ACUTE ON CHRONIC CONGESTIVE HEART FAILURE, UNSPECIFIED HEART FAILURE TYPE (HCC): Primary | ICD-10-CM

## 2022-06-25 LAB
ALBUMIN SERPL-MCNC: 3.4 G/DL (ref 3.4–5)
ALBUMIN/GLOB SERPL: 0.9 {RATIO} (ref 0.8–1.7)
ALP SERPL-CCNC: 107 U/L (ref 45–117)
ALT SERPL-CCNC: 38 U/L (ref 13–56)
ANION GAP SERPL CALC-SCNC: 10 MMOL/L (ref 3–18)
AST SERPL-CCNC: 43 U/L (ref 10–38)
BASE DEFICIT BLDV-SCNC: 0.2 MMOL/L
BASOPHILS # BLD: 0 K/UL (ref 0–0.1)
BASOPHILS NFR BLD: 0 % (ref 0–2)
BILIRUB SERPL-MCNC: 0.8 MG/DL (ref 0.2–1)
BNP SERPL-MCNC: ABNORMAL PG/ML (ref 0–900)
BUN SERPL-MCNC: 55 MG/DL (ref 7–18)
BUN/CREAT SERPL: 13 (ref 12–20)
CALCIUM SERPL-MCNC: 8.8 MG/DL (ref 8.5–10.1)
CHLORIDE SERPL-SCNC: 104 MMOL/L (ref 100–111)
CO2 SERPL-SCNC: 24 MMOL/L (ref 21–32)
COVID-19 RAPID TEST, COVR: NOT DETECTED
CREAT SERPL-MCNC: 4.12 MG/DL (ref 0.6–1.3)
DIFFERENTIAL METHOD BLD: ABNORMAL
EOSINOPHIL # BLD: 0 K/UL (ref 0–0.4)
EOSINOPHIL NFR BLD: 1 % (ref 0–5)
ERYTHROCYTE [DISTWIDTH] IN BLOOD BY AUTOMATED COUNT: 17.1 % (ref 11.6–14.5)
GLOBULIN SER CALC-MCNC: 3.6 G/DL (ref 2–4)
GLUCOSE SERPL-MCNC: 134 MG/DL (ref 74–99)
HCO3 BLDV-SCNC: 23.9 MMOL/L (ref 23–28)
HCT VFR BLD AUTO: 31.1 % (ref 35–45)
HGB BLD-MCNC: 9.5 G/DL (ref 12–16)
IMM GRANULOCYTES # BLD AUTO: 0 K/UL (ref 0–0.04)
IMM GRANULOCYTES NFR BLD AUTO: 0 % (ref 0–0.5)
INR PPP: 1.1 (ref 0.8–1.2)
LACTATE SERPL-SCNC: 2.4 MMOL/L (ref 0.4–2)
LYMPHOCYTES # BLD: 0.6 K/UL (ref 0.9–3.6)
LYMPHOCYTES NFR BLD: 11 % (ref 21–52)
MCH RBC QN AUTO: 28 PG (ref 24–34)
MCHC RBC AUTO-ENTMCNC: 30.5 G/DL (ref 31–37)
MCV RBC AUTO: 91.7 FL (ref 78–100)
MONOCYTES # BLD: 0.3 K/UL (ref 0.05–1.2)
MONOCYTES NFR BLD: 6 % (ref 3–10)
NEUTS SEG # BLD: 4.3 K/UL (ref 1.8–8)
NEUTS SEG NFR BLD: 82 % (ref 40–73)
NRBC # BLD: 0.14 K/UL (ref 0–0.01)
NRBC BLD-RTO: 2.7 PER 100 WBC
PCO2 BLDV: 35.9 MMHG (ref 41–51)
PH BLDV: 7.43 [PH] (ref 7.32–7.42)
PLATELET # BLD AUTO: 170 K/UL (ref 135–420)
PMV BLD AUTO: 12.5 FL (ref 9.2–11.8)
PO2 BLDV: 45 MMHG (ref 25–40)
POTASSIUM SERPL-SCNC: 3.3 MMOL/L (ref 3.5–5.5)
PROT SERPL-MCNC: 7 G/DL (ref 6.4–8.2)
PROTHROMBIN TIME: 15.1 SEC (ref 11.5–15.2)
RBC # BLD AUTO: 3.39 M/UL (ref 4.2–5.3)
SAO2 % BLDV: 82.4 % (ref 65–88)
SERVICE CMNT-IMP: ABNORMAL
SODIUM SERPL-SCNC: 138 MMOL/L (ref 136–145)
SOURCE, COVRS: NORMAL
SPECIMEN TYPE: ABNORMAL
TROPONIN-HIGH SENSITIVITY: 106 NG/L (ref 0–54)
WBC # BLD AUTO: 5.3 K/UL (ref 4.6–13.2)

## 2022-06-25 PROCEDURE — 74011250637 HC RX REV CODE- 250/637: Performed by: STUDENT IN AN ORGANIZED HEALTH CARE EDUCATION/TRAINING PROGRAM

## 2022-06-25 PROCEDURE — 85025 COMPLETE CBC W/AUTO DIFF WBC: CPT

## 2022-06-25 PROCEDURE — 93005 ELECTROCARDIOGRAM TRACING: CPT

## 2022-06-25 PROCEDURE — 80053 COMPREHEN METABOLIC PANEL: CPT

## 2022-06-25 PROCEDURE — 83605 ASSAY OF LACTIC ACID: CPT

## 2022-06-25 PROCEDURE — 82803 BLOOD GASES ANY COMBINATION: CPT

## 2022-06-25 PROCEDURE — 74011250636 HC RX REV CODE- 250/636: Performed by: STUDENT IN AN ORGANIZED HEALTH CARE EDUCATION/TRAINING PROGRAM

## 2022-06-25 PROCEDURE — 85610 PROTHROMBIN TIME: CPT

## 2022-06-25 PROCEDURE — 83880 ASSAY OF NATRIURETIC PEPTIDE: CPT

## 2022-06-25 PROCEDURE — 87635 SARS-COV-2 COVID-19 AMP PRB: CPT

## 2022-06-25 PROCEDURE — 71045 X-RAY EXAM CHEST 1 VIEW: CPT

## 2022-06-25 PROCEDURE — 84484 ASSAY OF TROPONIN QUANT: CPT

## 2022-06-25 PROCEDURE — 99285 EMERGENCY DEPT VISIT HI MDM: CPT

## 2022-06-25 PROCEDURE — 96374 THER/PROPH/DIAG INJ IV PUSH: CPT

## 2022-06-25 RX ORDER — FUROSEMIDE 10 MG/ML
80 INJECTION INTRAMUSCULAR; INTRAVENOUS ONCE
Status: COMPLETED | OUTPATIENT
Start: 2022-06-25 | End: 2022-06-25

## 2022-06-25 RX ORDER — POTASSIUM CHLORIDE 20 MEQ/1
40 TABLET, EXTENDED RELEASE ORAL
Status: COMPLETED | OUTPATIENT
Start: 2022-06-25 | End: 2022-06-25

## 2022-06-25 RX ADMIN — POTASSIUM CHLORIDE 40 MEQ: 20 TABLET, EXTENDED RELEASE ORAL at 19:22

## 2022-06-25 RX ADMIN — FUROSEMIDE 80 MG: 10 INJECTION, SOLUTION INTRAMUSCULAR; INTRAVENOUS at 19:24

## 2022-06-25 NOTE — PROGRESS NOTES
MULTIPLE ATTEMPTS TO OBTAIN ABG. SPO2 100% ON 2L NC.  PER DR. Colin Lewis, MAY RUN VENOUS BLOOD GAS.

## 2022-06-25 NOTE — ED PROVIDER NOTES
EMERGENCY DEPARTMENT HISTORY AND PHYSICAL EXAM    Date: 2022  Patient Name: Edgard Mcdowell    History of Presenting Illness     No chief complaint on file. History Provided By: Patient     History [place] Dulce Maria): Edgard Mcdowell is a 74 y.o. female with PMHX of HFrEF (EF 20%), NSTEMI s/p 4 stents on Brilinta and aspirin, CKD, hypertension presents to the ER for shortness of breath. Patient states that she had a cardiac cath on 25 May, was discharged home feeling well. As of 2 days ago, patient had increasingly worsening shortness of breath, especially on exertion, so she came to the ER for evaluation. Patient denies any chest pain, states that she is taking her medications as prescribed. Denies any fever or chills, no abdominal pain, nausea, vomiting. Chief Complaint: Shortness of breath  Onset: Acute onset  Timin days  Context: Symptoms started spontaneously, symptoms have progressively worsened since onset  Location: Shortness of breath  Quality: n/a  Severity: N/A  Modifying Factors: Moving makes her shortness of breath worse, she is at rest it is tolerable. Associated Symptoms: denies any other associated signs or symptoms    PCP: Ron Palacio MD     Current Facility-Administered Medications   Medication Dose Route Frequency Provider Last Rate Last Admin    furosemide (LASIX) injection 80 mg  80 mg IntraVENous ONCE Ofelia Salinas, DO        potassium chloride (K-DUR, KLOR-CON M20) SR tablet 40 mEq  40 mEq Oral NOW Ofelia Salinas, DO         Current Outpatient Medications   Medication Sig Dispense Refill    furosemide (LASIX) 40 mg tablet Take 40 mg by mouth daily.  hydrALAZINE (APRESOLINE) 10 mg tablet Take 10 mg by mouth three (3) times daily.  busPIRone (BUSPAR) 5 mg tablet Take 5 mg by mouth three (3) times daily as needed. Indications: repeated episodes of anxiety      carvediloL (COREG) 6.25 mg tablet Take 6.25 mg by mouth two (2) times a day.            Review of Systems REVIEW OF SYSTEMS:    CONST: Negative for fever, body aches and chills. HENT: Negative for neck pain/stiffness, headache, congestion, sore throat, swelling. EYES: Negative for discharge/pain or vision changes. RESP: Negative for cough/hemoptysis, + shortness of breath. CV: Negative chest pain, palpitations. ABD: Negative pain, nausea, vomiting. : Negative increase frequency, dysuria, blood in urine or stool. MUSC: Negative for muscle weakness or edema. SKIN: Negative rash, lesions/sores. NEURO: Negative headache, dizziness, focal neurological deficits. Past History     I have reviewed all PMHX, FMHX and Social Hx as entered into the medical record in the chart below using the Epic Template. Past Medical History:  Past Medical History:   Diagnosis Date    CHF (congestive heart failure) ([facility] [address]     Hypertension        Past Surgical History:  Past Surgical History:   Procedure Laterality Date    HX MASTECTOMY         Family History:  History reviewed. No pertinent family history. Reviewed and non-contributory    Social History:  Social History     Tobacco Use    Smoking status: Never Smoker    Smokeless tobacco: Never Used   Substance Use Topics    Alcohol use: Not Currently    Drug use: Never       Allergies: Allergies   Allergen Reactions    Sulfa (Sulfonamide Antibiotics) Anxiety         Physical Exam     Vitals:    06/25/22 1608   BP: 125/86   Pulse: 87   Resp: 16   Temp: 97 °F (36.1 °C)   SpO2: 98%   Weight: 81.6 kg (180 lb)   Height: 5' 6\" (1.676 m)       Physical Exam  Vitals and nursing note reviewed. Constitutional:       General: Pt is not in acute distress. Appearance: Pt is well-developed, not diaphoretic. HENT:      Head: Normocephalic and atraumatic.       Ear: External ear normal b/l     Nose: Nose normal.   Eyes:      General: No scleral icterus, EOMI     Pupil: NAHOMI     Conjunctiva/sclera: Conjunctivae normal.   Cardiovascular:      Rate and Rhythm: Normal rate and regular rhythm. Heart sounds: Normal heart sounds   Pulmonary:      Effort: Patient is tachypneic with increased work of breathing, speaking in partial word sentences. Breath sounds: Crackles in all lung fields    Abdominal:      Palpations: Abdomen is soft. Tenderness: There is no abdominal tenderness. There is no right CVA tenderness, guarding or rebound. Negative signs include Gongora's and McBurney's sign. Musculoskeletal:         General: Normal range of motion. Cervical back: Normal range of motion. Skin:     General: Skin is warm and dry. Neurological:      Mental Status: Pt is alert and oriented to person, place, and time.    Psychiatric:         Behavior: Behavior normal.         Judgment: Judgment normal.       Diagnostic Study Results     Labs -     Recent Results (from the past 12 hour(s))   EKG, 12 LEAD, INITIAL    Collection Time: 06/25/22  4:10 PM   Result Value Ref Range    Ventricular Rate 85 BPM    Atrial Rate 85 BPM    P-R Interval 156 ms    QRS Duration 126 ms    Q-T Interval 434 ms    QTC Calculation (Bezet) 516 ms    Calculated P Axis 55 degrees    Calculated R Axis -28 degrees    Calculated T Axis 116 degrees    Diagnosis       Sinus rhythm with frequent premature ventricular complexes  Left atrial enlargement  Left ventricular hypertrophy with QRS widening and repolarization abnormality   ( R in aVL , Neftali product )  Cannot rule out Septal infarct (cited on or before 13-JAN-2022)  Abnormal ECG  When compared with ECG of 21-MAY-2022 11:14,  ST now depressed in Lateral leads  T wave inversion more evident in Lateral leads     CBC WITH AUTOMATED DIFF    Collection Time: 06/25/22  4:15 PM   Result Value Ref Range    WBC 5.3 4.6 - 13.2 K/uL    RBC 3.39 (L) 4.20 - 5.30 M/uL    HGB 9.5 (L) 12.0 - 16.0 g/dL    HCT 31.1 (L) 35.0 - 45.0 %    MCV 91.7 78.0 - 100.0 FL    MCH 28.0 24.0 - 34.0 PG    MCHC 30.5 (L) 31.0 - 37.0 g/dL    RDW 17.1 (H) 11.6 - 14.5 % PLATELET 744 069 - 350 K/uL    MPV 12.5 (H) 9.2 - 11.8 FL    NRBC 2.7 (H) 0  WBC    ABSOLUTE NRBC 0.14 (H) 0.00 - 0.01 K/uL    NEUTROPHILS 82 (H) 40 - 73 %    LYMPHOCYTES 11 (L) 21 - 52 %    MONOCYTES 6 3 - 10 %    EOSINOPHILS 1 0 - 5 %    BASOPHILS 0 0 - 2 %    IMMATURE GRANULOCYTES 0 0.0 - 0.5 %    ABS. NEUTROPHILS 4.3 1.8 - 8.0 K/UL    ABS. LYMPHOCYTES 0.6 (L) 0.9 - 3.6 K/UL    ABS. MONOCYTES 0.3 0.05 - 1.2 K/UL    ABS. EOSINOPHILS 0.0 0.0 - 0.4 K/UL    ABS. BASOPHILS 0.0 0.0 - 0.1 K/UL    ABS. IMM. GRANS. 0.0 0.00 - 0.04 K/UL    DF AUTOMATED     PROTHROMBIN TIME + INR    Collection Time: 06/25/22  4:15 PM   Result Value Ref Range    Prothrombin time 15.1 11.5 - 15.2 sec    INR 1.1 0.8 - 1.2     METABOLIC PANEL, COMPREHENSIVE    Collection Time: 06/25/22  4:15 PM   Result Value Ref Range    Sodium 138 136 - 145 mmol/L    Potassium 3.3 (L) 3.5 - 5.5 mmol/L    Chloride 104 100 - 111 mmol/L    CO2 24 21 - 32 mmol/L    Anion gap 10 3.0 - 18 mmol/L    Glucose 134 (H) 74 - 99 mg/dL    BUN 55 (H) 7.0 - 18 MG/DL    Creatinine 4.12 (H) 0.6 - 1.3 MG/DL    BUN/Creatinine ratio 13 12 - 20      GFR est AA 13 (L) >60 ml/min/1.73m2    GFR est non-AA 11 (L) >60 ml/min/1.73m2    Calcium 8.8 8.5 - 10.1 MG/DL    Bilirubin, total 0.8 0.2 - 1.0 MG/DL    ALT (SGPT) 38 13 - 56 U/L    AST (SGOT) 43 (H) 10 - 38 U/L    Alk.  phosphatase 107 45 - 117 U/L    Protein, total 7.0 6.4 - 8.2 g/dL    Albumin 3.4 3.4 - 5.0 g/dL    Globulin 3.6 2.0 - 4.0 g/dL    A-G Ratio 0.9 0.8 - 1.7     NT-PRO BNP    Collection Time: 06/25/22  4:15 PM   Result Value Ref Range    NT pro-BNP 28,041 (H) 0 - 900 PG/ML   TROPONIN-HIGH SENSITIVITY    Collection Time: 06/25/22  4:15 PM   Result Value Ref Range    Troponin-High Sensitivity 106 (HH) 0 - 54 ng/L   LACTIC ACID    Collection Time: 06/25/22  4:15 PM   Result Value Ref Range    Lactic acid 2.4 (HH) 0.4 - 2.0 MMOL/L   POC VENOUS BLOOD GAS    Collection Time: 06/25/22  5:18 PM   Result Value Ref Range pH, venous (POC) 7.43 (H) 7.32 - 7.42      pCO2, venous (POC) 35.9 (L) 41 - 51 MMHG    pO2, venous (POC) 45 (H) 25 - 40 mmHg    HCO3, venous (POC) 23.9 23.0 - 28.0 MMOL/L    sO2, venous (POC) 82.4 65 - 88 %    Base deficit, venous (POC) 0.2 mmol/L    Specimen type (POC) VENOUS BLOOD      Performed by Dylan Friedman        Radiologic Studies -  XR CHEST PORT   Final Result      1. Interstitial lung process, most likely pulmonary edema. CT Results  (Last 48 hours)    None        CXR Results  (Last 48 hours)               06/25/22 1731  XR CHEST PORT Final result    Impression:      1. Interstitial lung process, most likely pulmonary edema. Narrative:  CLINICAL HISTORY:  Shortness of breath. COMPARISONS:  Chest x-ray 5/23/2022       TECHNIQUE:  single frontal view of the chest       ------------------------------------------       FINDINGS:       Lungs:  Hypoinflation. Mild to moderate increase in bilateral pulmonary   interstitial markings, greatest in the perihilar region. Elevated right   hemidiaphragm. No evidence of dense consolidation or pleural fluid. Mediastinum: Probable mild cardiomegaly, not well assessed. Atherosclerotic   arterial calcification. Bones: No evidence of fracture or suspicious bone lesion.           ------------------------------------------             Medications given in the ED-  Medications   furosemide (LASIX) injection 80 mg (has no administration in time range)   potassium chloride (K-DUR, KLOR-CON M20) SR tablet 40 mEq (has no administration in time range)         Medical Decision Making   I am the first provider for this patient. I reviewed the vital signs, available nursing notes, past medical history, past surgical history, family history and social history. Vital Signs-Reviewed the patient's vital signs.     Pulse Oximetry Analysis - 98% on RA     Cardiac Monitor:  Rate: 87 bpm  Rhythm: sinus rhythm    ED Course:   4:00 PM Initial assessment performed. The patients presenting problems have been discussed, and they are in agreement with the care plan formulated and outlined with them. I have encouraged them to ask questions as they arise throughout their visit. ED Course as of 06/25/22 1850   Sat Jun 25, 2022   1813 EKG with sinus rhythm at 85 bpm, frequent PVCs, QRS is 516, no ST elevations or depressions, no STEMI [NN]      ED Course User Index  [NN] Reynaldo Zapata,       Patient is ill-appearing, tachypneic, working to breathe. History is limited secondary to patient's respiratory distress. Saturating 100% on 2 L of nasal cannula for patient's comfort. Patient has a LifeVest currently in place. Concern for CHF exacerbation versus stent thrombosis versus postop complications. Patient will need close observation, cardiac work-up, monitoring as well as reassessment. 1800 patient reassessed multiple times during hospital stay, vital signs are stable. If patient is at rest, her respiratory symptoms prove. Patient noted to have an initial troponin of 106 with a lactate of 2.4. Chest x-ray with cephalization, similar to her prior chest x-ray on record. Given patient's symptoms, cardiology was consulted regarding admission. CONSULT: Dr. Monico Churchill, Cardiology at 1800, case discussed with cardiology, recommended transfer to [address] [place] [address] as patient had just received stent placement less than a month ago. CONSULT: Dr. Mike Ballard at 5614, case discussed with cardiology at [address] [place] [address], accepted for transfer to their house unit. Recommended starting Lasix 80 mg IV pending room placement. 1830: Patient updated on recommendations by cardiology, will arrange for transport. Diagnosis and Disposition     Critical Care Time:   I have spent 45 minutes of critical care time involved in lab review, consultations with specialist, family decision-making, and documentation. During this entire length of time I was immediately available to the patient.     Critical Care: The reason for providing this level of medical care for this critically ill patient was due a critical illness that impaired one or more vital organ systems such that there was a high probability of imminent or life threatening deterioration in the patients condition. This care involved high complexity decision making to assess, manipulate, and support vital system functions, to treat this degreee vital       Transfer NOTE:    Osman Nazario's  results have been reviewed with her. She has been counseled regarding her diagnosis, treatment, and plan as well as my recommendation for transfer and the reasons why. She verbally conveys understanding and agreement of the signs, symptoms, diagnosis, treatment and prognosis and additionally agrees with the plan. Case discussed with admitting physician and any consultants on board. Bed type, ED treatment and further ED workup decided by joint plan of care made in consensus with admitting physician and any consultants, including what management will be deferred to the inpatient setting    CLINICAL IMPRESSION:    1. Acute on chronic congestive heart failure, unspecified heart failure type ([address]    2. SOB (shortness of breath)    3. Elevated troponin        PLAN:  1. Transfer to [facility] for admission       Dragon Disclaimer     Please note that this dictation was completed with [employer], the computer voice recognition software. Quite often unanticipated grammatical, syntax, homophones, and other interpretive errors are inadvertently transcribed by the computer software. Please disregard these errors. Please excuse any errors that have escaped final proofreading.     Susy Alarcon, DO

## 2022-06-25 NOTE — ED NOTES
Pt reports she had 4 cardiac stents placed last week and had follow up yesterday for that. Today pt reports that she has been feeling dizzy and feels like she might pass out. Pt denies syncope. Pt has Zol vest in place and is on supplemental oxygen.

## 2022-07-04 LAB
ATRIAL RATE: 85 BPM
CALCULATED P AXIS, ECG09: 55 DEGREES
CALCULATED R AXIS, ECG10: -28 DEGREES
CALCULATED T AXIS, ECG11: 116 DEGREES
DIAGNOSIS, 93000: NORMAL
P-R INTERVAL, ECG05: 156 MS
Q-T INTERVAL, ECG07: 434 MS
QRS DURATION, ECG06: 126 MS
QTC CALCULATION (BEZET), ECG08: 516 MS
VENTRICULAR RATE, ECG03: 85 BPM

## 2022-08-21 NOTE — ED TRIAGE NOTES
Patient arrived via ems from home. Has been having extreme dizziness for 24 hours.  4 stints placed at [facility]  Patient 140 sitting standing 90 systolic. patient wearing a life vest. Denies chest pain and no shortness of breath
Attending Only

## (undated) DEVICE — PROCEDURE KIT FLUID MGMT 10 FR CUST MAINFOLD

## (undated) DEVICE — GUIDEWIRE VASC L260CM DIA0.035IN RAD 3MM J TIP L7CM PTFE

## (undated) DEVICE — Device

## (undated) DEVICE — PACK PROCEDURE SURG CATH CUST

## (undated) DEVICE — TORCON NB, ADVANTAGE CATHETER: Brand: TORCON NB

## (undated) DEVICE — TUBING PRSS MON L24IN PVC RIG NONEXPANDING M TO FEM CONN

## (undated) DEVICE — GLIDESHEATH SLENDER STAINLESS STEEL KIT: Brand: GLIDESHEATH SLENDER

## (undated) DEVICE — CATHETER DIAG AD 5FR L100CM COR GRY HYDRPHLC NYL MPA 2 W/ 2

## (undated) DEVICE — CATHETER ANGIO NTR 0.045 INX5 FRX100 CM THRULUMEN EXPO

## (undated) DEVICE — PRESSURE MONITORING SET: Brand: TRUWAVE

## (undated) DEVICE — STOPCOCK TRNSDUC 500PSI 3 W ROT M LUER LT BLU OFF HNDL R

## (undated) DEVICE — SPLINT WR POS F/ARTERIAL ACC -- BX/10

## (undated) DEVICE — CATHETER ANGIO 4FR L100CM S STL NYL MPA 2 W/ 2 SIDE H 3 SEG

## (undated) DEVICE — ANGIOGRAPHIC CATHETER: Brand: EXPO™

## (undated) DEVICE — SHIELD RAD 14X16 IN W/ SCOOP ABSORBER

## (undated) DEVICE — SWAN-GANZ POLYMER BLEND TRUE SIZE C-TIP CONTROLCATH TD CATHETER: Brand: SWAN-GANZ CONTROLCATH TRUE SIZE

## (undated) DEVICE — DRAPE,ANGIO,BRACH,STERILE,38X44: Brand: MEDLINE

## (undated) DEVICE — SENSOR PLSE OXMTR AD CBL L36IN ADH FRM FIT SPO2 DISP

## (undated) DEVICE — RADIFOCUS GLIDEWIRE: Brand: GLIDEWIRE